# Patient Record
Sex: MALE | Race: WHITE | NOT HISPANIC OR LATINO | Employment: FULL TIME | URBAN - METROPOLITAN AREA
[De-identification: names, ages, dates, MRNs, and addresses within clinical notes are randomized per-mention and may not be internally consistent; named-entity substitution may affect disease eponyms.]

---

## 2017-01-30 ENCOUNTER — HOSPITAL ENCOUNTER (EMERGENCY)
Facility: CLINIC | Age: 57
Discharge: HOME | End: 2017-01-30
Attending: PHYSICIAN ASSISTANT | Admitting: PHYSICIAN ASSISTANT
Payer: OTHER MISCELLANEOUS

## 2017-01-30 VITALS
TEMPERATURE: 97.8 F | HEART RATE: 58 BPM | RESPIRATION RATE: 22 BRPM | DIASTOLIC BLOOD PRESSURE: 94 MMHG | OXYGEN SATURATION: 98 % | SYSTOLIC BLOOD PRESSURE: 163 MMHG

## 2017-01-30 DIAGNOSIS — M54.6 ACUTE RIGHT-SIDED THORACIC BACK PAIN: ICD-10-CM

## 2017-01-30 PROCEDURE — 99213 OFFICE O/P EST LOW 20 MIN: CPT | Performed by: PHYSICIAN ASSISTANT

## 2017-01-30 PROCEDURE — 25000132 ZZH RX MED GY IP 250 OP 250 PS 637: Performed by: PHYSICIAN ASSISTANT

## 2017-01-30 PROCEDURE — 99212 OFFICE O/P EST SF 10 MIN: CPT

## 2017-01-30 RX ORDER — HYDROCODONE BITARTRATE AND ACETAMINOPHEN 5; 325 MG/1; MG/1
2 TABLET ORAL ONCE
Status: COMPLETED | OUTPATIENT
Start: 2017-01-30 | End: 2017-01-30

## 2017-01-30 RX ORDER — CYCLOBENZAPRINE HCL 10 MG
10 TABLET ORAL 3 TIMES DAILY PRN
Qty: 20 TABLET | Refills: 0 | Status: SHIPPED | OUTPATIENT
Start: 2017-01-30 | End: 2017-02-05

## 2017-01-30 RX ADMIN — HYDROCODONE BITARTRATE AND ACETAMINOPHEN 2 TABLET: 5; 325 TABLET ORAL at 16:24

## 2017-01-30 NOTE — ED AVS SNAPSHOT
[facility]    [address]    [facility] [address]    Phone:  [phone]    Fax:  [phone]                                       Chin Jade   MRN: [id number]    Department:  [facility]   Date of Visit:  1/30/2017           Patient Information     Date Of Birth          1960        Your diagnoses for this visit were:     Acute right-sided thoracic back pain        You were seen by Marie Godinez PA-C.      Follow-up Information     Follow up with GIOVANNA Boles MD In 1 day.    Specialty:  Family Practice    Contact information:    [address] [id number] [phone]          Discharge Instructions         Ibuprofen 600-800 mg every 6 hours. TAKE WITH FOOD.  Flexeril as prescribed for muscle spasm.   Alternate heat and ice. Gentle stretching as tolerated.   Follow up with primary care doctor tomorrow for close reassessment.   Neck/Back Pain: General    Both neck and back pain are usually caused by injury to the muscles or ligaments of the spine. Sometimes the disks that separate each bone of the spine may cause pain by pressing on a nearby nerve. Back and neck pain may appear after a sudden twisting/bending force (such as in a car accident), or sometimes after a simple awkward movement. In either case, muscle spasm is often present and adds to the pain.  Acute neck and back pain usually gets better in 1 to 2 weeks. Pain related to disk disease, arthritis in the spinal joints or spinal stenosis (narrowing of the spinal canal) can become chronic and last for months or years.  Back and neck pain are common problems. Most people feel better in 1 or 2 weeks, and most of the rest in 1 to 2 months. Most people can remain active.  Symptoms  People experience and describe pain differently.    Pain can be sharp, stabbing, shooting, aching, cramping, or burning    Movement, standing, bending, lifting,  "sitting, or walking may worsen the pain    Pain can be localized to one spot or area, or it can be more generalized    Pain can spread or radiate upwards, downwards, to the front, or go down your arms    Muscle spasm may occur.  Cause  Most of the time \"mechanical problems\" with the muscles or spine cause the pain. it is usually caused by an injury, whether known or not, to the muscles or ligaments. While illnesses can cause back pain, it is usually not caused by a serious illness. Pain is usually related to physical activity, whether sports, exercise, work, or normal activity. Sometimes it can occur without an identifiable cause. This can happen simply by stretching or moving wrong, without noting pain at the time. Other causes include:    Overexertion, lifting, pushing, pulling incorrectly or too aggressively.    Sudden twisting, bending or stretching from an accident (car or fall), or accidental movement.    Poor posture    Poor conditioning, lack of regular exercise    Spinal disc disease or arthritis    Stress    Pregnancy, or illness like appendicitis, bladder or kidney infection, pelvic infections   Home care    For neck pain: Use a comfortable pillow that supports the head and keeps the spine in a neutral position. The position of the head should not be tilted forward or backward.    When in bed, try to find a position of comfort. A firm mattress is best. Try lying flat on your back with pillows under your knees. You can also try lying on your side with your knees bent up towards your chest and a pillow between your knees.    At first, do not try to stretch out the sore spots. If there is a strain, it is not like the good soreness you get after exercising without an injury. In this case, stretching may make it worse.    Avoid prolonged sitting, long car rides or travel. This puts more stress on the lower back than standing or walking.    During the first 24 to 72 hours after an injury, apply an ice pack to " the painful area for 20 minutes and then remove it for 20 minutes over a period of 60 to 90 minutes or several times a day. As a safety precaution, do not use a heating pad at bedtime. Sleeping with a heating pad can lead to skin burns or tissue damage.    Ice and heat therapies can be alternated. Talk with your health care provider about the best treatment for your back or neck pain.    Therapeutic massage can help relax the back and neck muscles without stretching them.    Be aware of safe lifting methods and do not lift anything over 15 pounds until all the pain is gone.  Medications  Talk to your health care provider before using medications, especially if you have other medical problems or are taking other medicines.    You may use acetaminophen (such as Tylenol) or ibuprofen (such as Advil or Motrin) to control pain, unless another pain medicine was prescribed. If you have chronic conditions like diabetes, liver or kidney disease, stomach ulcers,  gastrointestinal bleeding, or are taking blood thinner medications.    Be careful if you are given pain medicines, narcotics, or medication for muscle spasm. They can cause drowsiness, and can affect your coordination, reflexes, and judgment. Do not drive or operate heavy machinery.  Follow-up care  Follow up with your health care provider if your symptoms do not start to improve after one week. Physical therapy or further tests may be needed.  If X-rays were taken, they will be reviewed by a radiologist. You will be notified of any new findings that may affect your care.  Call 911  Seek emergency medical care if any of the following occur:    Trouble breathing    Confusion    Very drowsy or trouble awakening    Fainting or loss of consciousness    Rapid or very slow heart rate    Loss of bowel or bladder control  When to seek medical care  Get prompt medical attention if any of the following occur:    Pain becomes worse or spreads into your arms or legs    Weakness,  numbness or pain in one or both arms or legs    Numbness in the groin area    Difficulty walking    Fever of 100.4 F (38 C) or higher, or as directed by your healthcare provider    8233-7409 [employer]. [address] [place] [address], [place] [address]. All rights reserved. This information is not intended as a substitute for professional medical care. Always follow your healthcare professional's instructions.          24 Hour Appointment Hotline       To make an appointment at any [facility], call [phone] ([phone]). If you don't have a family doctor or clinic, we will help you find one. [facility] clinics are conveniently located to serve the needs of you and your family.             Review of your medicines      START taking        Dose / Directions Last dose taken    cyclobenzaprine 10 MG tablet   Commonly known as:  FLEXERIL   Dose:  10 mg   Quantity:  20 tablet        Take 1 tablet (10 mg) by mouth 3 times daily as needed for muscle spasms   Refills:  0          Our records show that you are taking the medicines listed below. If these are incorrect, please call your family doctor or clinic.        Dose / Directions Last dose taken    acyclovir 400 MG tablet   Commonly known as:  ZOVIRAX   Dose:  400 mg   Quantity:  10 tablet        Take 1 tablet (400 mg) by mouth 2 times daily for 5 days   Refills:  2        aspirin 81 MG tablet   Dose:  81 mg   Quantity:  100 tablet        Take 1 tablet (81 mg) by mouth daily   Refills:  3        atorvastatin 40 MG tablet   Commonly known as:  LIPITOR   Dose:  40 mg   Quantity:  90 tablet        Take 1 tablet (40 mg) by mouth daily   Refills:  3        blood glucose monitoring meter device kit   Quantity:  1 kit        Use to test blood sugars 2 times daily or as directed.   Refills:  0        blood glucose monitoring test strip   Commonly known as:  BL TEST STRIP PACK   Quantity:  1 Box        Free Style Freedom Lite Test Stips; test 2 times daily    Refills:  11        glimepiride 4 MG tablet   Commonly known as:  AMARYL   Dose:  2 mg   Quantity:  30 tablet        Take 1 tablet (4 mg) by mouth every morning (before breakfast)   Refills:  2        lisinopril 10 MG tablet   Commonly known as:  PRINIVIL/ZESTRIL   Dose:  10 mg   Quantity:  90 tablet        Take 1 tablet (10 mg) by mouth daily   Refills:  3        metFORMIN 500 MG tablet   Commonly known as:  GLUCOPHAGE   Dose:  1000 mg   Quantity:  120 tablet        Take 2 tablets (1,000 mg) by mouth 2 times daily (with meals)   Refills:  2        order for DME   Quantity:  4 Units        Equipment being ordered: 2 pair of diabetic shoes and 3 sets of shoe inserts   Refills:  0        sildenafil 100 MG cap/tab   Commonly known as:  VIAGRA   Dose:   mg   Quantity:  6 tablet        Take 0.5-1 tablets ( mg) by mouth daily as needed for erectile dysfunction Take 30 min to 4 hours before intercourse.  Never use with nitroglycerin, terazosin or doxazosin.   Refills:  11                Prescriptions were sent or printed at these locations (1 Prescription)                   [facility] [place], [place] - [address]   [address] [facility] [address]    Telephone:  [phone]   Fax:  [phone]   Hours:                  E-Prescribed (1 of 1)         cyclobenzaprine (FLEXERIL) 10 MG tablet                Orders Needing Specimen Collection     None      Pending Results     No orders found from 1/29/2017 to 1/31/2017.            Pending Culture Results     No orders found from 1/29/2017 to 1/31/2017.             Test Results from your hospital stay            Thank you for choosing [facility]       Thank you for choosing [facility] for your care. Our goal is always to provide you with excellent care. Hearing back from our patients is one way we can continue to improve our services. Please take a few minutes to complete the written survey that you may receive in the mail after you visit with  us. Thank you!        [email or web address] Information     [facility] gives you secure access to your electronic health record. If you see a primary care provider, you can also send messages to your care team and make appointments. If you have questions, please call your primary care clinic.  If you do not have a primary care provider, please call [phone] and they will assist you.        Care EveryWhere ID     This is your Care EveryWhere ID. This could be used by other organizations to access your [facility] medical records  [id number]        After Visit Summary       This is your record. Keep this with you and show to your community pharmacist(s) and doctor(s) at your next visit.

## 2017-01-30 NOTE — ED PROVIDER NOTES
"  History     Chief Complaint   Patient presents with     Shoulder Pain     \"muscle pain\" to the right shoulder. Denies injury, strain.     HPI  Chin Jade is a 57 year old male with a hx of T2 DM, tobacco abuse, and HLD who presents to the ED with complaint of right upper back pain that he first noticed this morning while at work. States that he was performing light duty tasks all morning including some repetative reaching motions when he noticed gradually worsening pain over his right posterior shoulder and thoracic back on the right side. He describes it as feeling as if a muscle is \"all bunched up under there\". He took ibuprofen without relief of his symptoms approximately 4 hours ago. He denies any associated numbness, tingling, weakness, chest pain, shortness of breath, or nausea. He has a history of similar symptoms in the past, although never this severe. No personal or family history of cardiac problems.     I have reviewed the Medications, Allergies, Past Medical and Surgical History, and Social History in the Epic system.    Review of Systems  Problem-focused review of systems otherwise negative except what is noted in the HPI.   Physical Exam   BP: (!) 163/94 mmHg  Pulse: 58  Temp: 97.8  F (36.6  C)  Resp: 22  SpO2: 98 %  Physical Exam  Nursing note and vitals were reviewed.  Constitutional: alert, well-appearing, appears uncomfortable.  HEENT: Within normal limits.  Neck: Supple, no adenopathy.  Cardiovascular: slow rate, regular rhythm, no murmur  Pulmonary/Chest: clear to auscultation bilaterally, no tachypnea, no wheezes, rales, or rhonchi. No chest wall tenderness.  Abdomen: soft, non-tender, no rebound or guarding, no organomegaly  Musculoskeletal: RUE: no bony tenderness clavicle, shoulder, upper arm, elbow. ROM RUE intact throughout. Radial pulses palpable and equal bilaterally. Strength 5/5 bilateral upper extremities. Sensation intact light touch. Reproducible tenderness right thoracic " paraspinal muscles and right posterior shoulder musculature without palpable spasm.  Back: no midline tenderness.   Neurological: Alert, oriented, logical thought process  Skin: Warm, dry, no rashes.  Psychiatric: Affect appropriate   ED Course   Procedures               Labs Ordered and Resulted from Time of ED Arrival Up to the Time of Departure from the ED - No data to display    Assessments & Plan (with Medical Decision Making)   57 year old male with a hx of T2 DM, tobacco abuse, and HLD who presents to the ED with complaint of right upper back pain that he first noticed this morning while at work. VS are notable for hypertension, likely secondary to pain. Patient has very reproducible tenderness with palpation right thoracic paraspinal muscles and posterior shoulder. No bony tenderness. CMS intact.     Patient's history and presentation are not concerning for ACS/MI as this would be very atypical - further patient has no history of CAD. His symptoms are not exertional which is less concerning for anginal equivalent. Aortic dissection considered, however patient's pain is not located over his chest. He has equal strength bilaterally and equal radial pulses. I have a very low suspicion for this based on patient's very reproducible pain on exam. Cervical radiculopathy considered, however patient has no history of neck pain and no midline tenderness. Further, he does not have neurologic symptoms that would be concerning for this. No abdominal pain to suggest radiated pain from diaphragmatic irritation. Patient's history of repetitive work while at work, reproducible tenderness, and gradually worsening symptoms is concerning for possible muscle strain. I had an extensive discussion with patient and his wife regarding the differential diagnosis. Patient feels strongly that this is a pulled muscle and requests symptomatic relief with close PCP follow up. I feel this is appropriate given history and presentation that  "suggests musculoskeletal etiology. Will give norco x 2 here in the ED and have patient follow up with PCP tomorrow. Will d/c with flexeril and instructions to take ibuprofen at the anti-inflammatory dosage. He will make an appointment with Dr. Boles tomorrow. Discussed \"red flag\" sx warranting return to the UC/ED.     I have reviewed the nursing notes.    I have reviewed the findings, diagnosis, plan and need for follow up with the patient.    New Prescriptions    CYCLOBENZAPRINE (FLEXERIL) 10 MG TABLET    Take 1 tablet (10 mg) by mouth 3 times daily as needed for muscle spasms       Final diagnoses:   Acute right-sided thoracic back pain     Marie Godinez PA-C     1/30/2017   [facility] EMERGENCY DEPARTMENT      Marie Godinez PA-C  01/30/17 1634  "

## 2017-01-30 NOTE — LETTER
[facility] EMERGENCY DEPARTMENT  [address] [address]  [phone]    2017    Chin Jade   [address]  [facility] [address]  [phone] (home)     : 1960      To Whom it may concern:    Chin Jade was seen in our Emergency Department today, 2017. He can return to work 2017.    Sincerely,        Marie Godinez

## 2017-01-30 NOTE — ED AVS SNAPSHOT
[facility]    [address] [facility] [address]    Phone:  [phone]    Fax:  [phone]                                       Chin Jade   MRN: [id number]    Department:  [facility]   Date of Visit:  1/30/2017           After Visit Summary Signature Page     I have received my discharge instructions, and my questions have been answered. I have discussed any challenges I see with this plan with the nurse or doctor.    ..........................................................................................................................................  Patient/Patient Representative Signature      ..........................................................................................................................................  Patient Representative Print Name and Relationship to Patient    ..................................................               ................................................  Date                                            Time    ..........................................................................................................................................  Reviewed by Signature/Title    ...................................................              ..............................................  Date                                                            Time

## 2017-01-30 NOTE — DISCHARGE INSTRUCTIONS
"  Ibuprofen 600-800 mg every 6 hours. TAKE WITH FOOD.  Flexeril as prescribed for muscle spasm.   Alternate heat and ice. Gentle stretching as tolerated.   Follow up with primary care doctor tomorrow for close reassessment.   Neck/Back Pain: General    Both neck and back pain are usually caused by injury to the muscles or ligaments of the spine. Sometimes the disks that separate each bone of the spine may cause pain by pressing on a nearby nerve. Back and neck pain may appear after a sudden twisting/bending force (such as in a car accident), or sometimes after a simple awkward movement. In either case, muscle spasm is often present and adds to the pain.  Acute neck and back pain usually gets better in 1 to 2 weeks. Pain related to disk disease, arthritis in the spinal joints or spinal stenosis (narrowing of the spinal canal) can become chronic and last for months or years.  Back and neck pain are common problems. Most people feel better in 1 or 2 weeks, and most of the rest in 1 to 2 months. Most people can remain active.  Symptoms  People experience and describe pain differently.    Pain can be sharp, stabbing, shooting, aching, cramping, or burning    Movement, standing, bending, lifting, sitting, or walking may worsen the pain    Pain can be localized to one spot or area, or it can be more generalized    Pain can spread or radiate upwards, downwards, to the front, or go down your arms    Muscle spasm may occur.  Cause  Most of the time \"mechanical problems\" with the muscles or spine cause the pain. it is usually caused by an injury, whether known or not, to the muscles or ligaments. While illnesses can cause back pain, it is usually not caused by a serious illness. Pain is usually related to physical activity, whether sports, exercise, work, or normal activity. Sometimes it can occur without an identifiable cause. This can happen simply by stretching or moving wrong, without noting pain at the time. Other causes " include:    Overexertion, lifting, pushing, pulling incorrectly or too aggressively.    Sudden twisting, bending or stretching from an accident (car or fall), or accidental movement.    Poor posture    Poor conditioning, lack of regular exercise    Spinal disc disease or arthritis    Stress    Pregnancy, or illness like appendicitis, bladder or kidney infection, pelvic infections   Home care    For neck pain: Use a comfortable pillow that supports the head and keeps the spine in a neutral position. The position of the head should not be tilted forward or backward.    When in bed, try to find a position of comfort. A firm mattress is best. Try lying flat on your back with pillows under your knees. You can also try lying on your side with your knees bent up towards your chest and a pillow between your knees.    At first, do not try to stretch out the sore spots. If there is a strain, it is not like the good soreness you get after exercising without an injury. In this case, stretching may make it worse.    Avoid prolonged sitting, long car rides or travel. This puts more stress on the lower back than standing or walking.    During the first 24 to 72 hours after an injury, apply an ice pack to the painful area for 20 minutes and then remove it for 20 minutes over a period of 60 to 90 minutes or several times a day. As a safety precaution, do not use a heating pad at bedtime. Sleeping with a heating pad can lead to skin burns or tissue damage.    Ice and heat therapies can be alternated. Talk with your health care provider about the best treatment for your back or neck pain.    Therapeutic massage can help relax the back and neck muscles without stretching them.    Be aware of safe lifting methods and do not lift anything over 15 pounds until all the pain is gone.  Medications  Talk to your health care provider before using medications, especially if you have other medical problems or are taking other medicines.    You  may use acetaminophen (such as Tylenol) or ibuprofen (such as Advil or Motrin) to control pain, unless another pain medicine was prescribed. If you have chronic conditions like diabetes, liver or kidney disease, stomach ulcers,  gastrointestinal bleeding, or are taking blood thinner medications.    Be careful if you are given pain medicines, narcotics, or medication for muscle spasm. They can cause drowsiness, and can affect your coordination, reflexes, and judgment. Do not drive or operate heavy machinery.  Follow-up care  Follow up with your health care provider if your symptoms do not start to improve after one week. Physical therapy or further tests may be needed.  If X-rays were taken, they will be reviewed by a radiologist. You will be notified of any new findings that may affect your care.  Call 911  Seek emergency medical care if any of the following occur:    Trouble breathing    Confusion    Very drowsy or trouble awakening    Fainting or loss of consciousness    Rapid or very slow heart rate    Loss of bowel or bladder control  When to seek medical care  Get prompt medical attention if any of the following occur:    Pain becomes worse or spreads into your arms or legs    Weakness, numbness or pain in one or both arms or legs    Numbness in the groin area    Difficulty walking    Fever of 100.4 F (38 C) or higher, or as directed by your healthcare provider    1919-6487 [employer] [address] [place] [address], [place] [address]. All rights reserved. This information is not intended as a substitute for professional medical care. Always follow your healthcare professional's instructions.

## 2017-01-31 ENCOUNTER — OFFICE VISIT (OUTPATIENT)
Dept: FAMILY MEDICINE | Facility: CLINIC | Age: 57
End: 2017-01-31
Payer: COMMERCIAL

## 2017-01-31 VITALS
SYSTOLIC BLOOD PRESSURE: 138 MMHG | TEMPERATURE: 97.7 F | DIASTOLIC BLOOD PRESSURE: 82 MMHG | RESPIRATION RATE: 16 BRPM | BODY MASS INDEX: 32.34 KG/M2 | HEART RATE: 76 BPM | WEIGHT: 252 LBS

## 2017-01-31 DIAGNOSIS — M79.18 ACUTE MYOFASCIAL PAIN: Primary | ICD-10-CM

## 2017-01-31 PROCEDURE — 99213 OFFICE O/P EST LOW 20 MIN: CPT | Performed by: FAMILY MEDICINE

## 2017-01-31 RX ORDER — HYDROCODONE BITARTRATE AND ACETAMINOPHEN 10; 325 MG/1; MG/1
2 TABLET ORAL EVERY 6 HOURS PRN
Qty: 24 TABLET | Refills: 0 | Status: SHIPPED | OUTPATIENT
Start: 2017-01-31 | End: 2017-02-26

## 2017-01-31 NOTE — NURSING NOTE
"Chief Complaint   Patient presents with     Shoulder Pain       Initial /82 mmHg  Pulse 76  Temp(Src) 97.7  F (36.5  C) (Oral)  Resp 16  Wt 252 lb (114.306 kg) Estimated body mass index is 32.34 kg/(m^2) as calculated from the following:    Height as of 1/12/16: 6' 2\" (1.88 m).    Weight as of this encounter: 252 lb (114.306 kg).  BP completed using cuff size: large    "

## 2017-01-31 NOTE — MR AVS SNAPSHOT
After Visit Summary   1/31/2017    Chin Jade    MRN: [id number]           Patient Information     Date Of Birth          1960        Visit Information        Provider Department      1/31/2017 9:20 AM GIOVANNA Boles MD [facility]        Today's Diagnoses     Acute myofascial pain    -  1       Care Instructions    Alternate ice and heat. Use a tennis ball to massage the area. REcheck if not better in 6 days        Follow-ups after your visit        Who to contact     If you have questions or need follow up information about today's clinic visit or your schedule please contact [facility] directly at [phone].  Normal or non-critical lab and imaging results will be communicated to you by MyChart, letter or phone within 4 business days after the clinic has received the results. If you do not hear from us within 7 days, please contact the clinic through [employer] or phone. If you have a critical or abnormal lab result, we will notify you by phone as soon as possible.  Submit refill requests through [employer] or call your pharmacy and they will forward the refill request to us. Please allow 3 business days for your refill to be completed.          Additional Information About Your Visit        MyChart Information     [employer] gives you secure access to your electronic health record. If you see a primary care provider, you can also send messages to your care team and make appointments. If you have questions, please call your primary care clinic.  If you do not have a primary care provider, please call [phone] and they will assist you.        Care EveryWhere ID     This is your Care EveryWhere ID. This could be used by other organizations to access your [place] medical records  [id number]        Your Vitals Were     Pulse Temperature Respirations             76 97.7  F (36.5  C) (Oral) 16          Blood Pressure from Last 3 Encounters:   01/31/17 140/82    01/30/17 163/94   07/18/16 130/62    Weight from Last 3 Encounters:   01/31/17 252 lb (114.306 kg)   07/18/16 246 lb 9.6 oz (111.857 kg)   01/12/16 255 lb (115.667 kg)              Today, you had the following     No orders found for display         Today's Medication Changes          These changes are accurate as of: 1/31/17 10:03 AM.  If you have any questions, ask your nurse or doctor.               Start taking these medicines.        Dose/Directions    HYDROcodone-acetaminophen  MG per tablet   Commonly known as:  NORCO   Used for:  Acute myofascial pain   Started by:  GIOVANNA Boles MD        Dose:  2 tablet   Take 2 tablets by mouth every 6 hours as needed for moderate to severe pain maximum 8 tablet(s) per day   Quantity:  24 tablet   Refills:  0            Where to get your medicines      Some of these will need a paper prescription and others can be bought over the counter.  Ask your nurse if you have questions.     Bring a paper prescription for each of these medications    - HYDROcodone-acetaminophen  MG per tablet             Primary Care Provider Office Phone # Fax #    GIOVANNA Boles [phone]       [place] [id number]        Thank you!     Thank you for choosing [facility]  for your care. Our goal is always to provide you with excellent care. Hearing back from our patients is one way we can continue to improve our services. Please take a few minutes to complete the written survey that you may receive in the mail after your visit with us. Thank you!             Your Updated Medication List - Protect others around you: Learn how to safely use, store and throw away your medicines at www.disposemymeds.org.          This list is accurate as of: 1/31/17 10:03 AM.  Always use your most recent med list.                   Brand Name Dispense Instructions for use    acyclovir 400 MG tablet    ZOVIRAX    10 tablet     Take 1 tablet (400 mg) by mouth 2 times daily for 5 days       aspirin 81 MG tablet     100 tablet    Take 1 tablet (81 mg) by mouth daily       atorvastatin 40 MG tablet    LIPITOR    90 tablet    Take 1 tablet (40 mg) by mouth daily       blood glucose monitoring meter device kit     1 kit    Use to test blood sugars 2 times daily or as directed.       blood glucose monitoring test strip    BL TEST STRIP PACK    1 Box    Free Style Freedom Lite Test Stips; test 2 times daily       cyclobenzaprine 10 MG tablet    FLEXERIL    20 tablet    Take 1 tablet (10 mg) by mouth 3 times daily as needed for muscle spasms       glimepiride 4 MG tablet    AMARYL    30 tablet    Take 1 tablet (4 mg) by mouth every morning (before breakfast)       HYDROcodone-acetaminophen  MG per tablet    NORCO    24 tablet    Take 2 tablets by mouth every 6 hours as needed for moderate to severe pain maximum 8 tablet(s) per day       lisinopril 10 MG tablet    PRINIVIL/ZESTRIL    90 tablet    Take 1 tablet (10 mg) by mouth daily       metFORMIN 500 MG tablet    GLUCOPHAGE    120 tablet    Take 2 tablets (1,000 mg) by mouth 2 times daily (with meals)       order for [facility]     4 Units    Equipment being ordered: 2 pair of diabetic shoes and 3 sets of shoe inserts       sildenafil 100 MG cap/tab    VIAGRA    6 tablet    Take 0.5-1 tablets ( mg) by mouth daily as needed for erectile dysfunction Take 30 min to 4 hours before intercourse.  Never use with nitroglycerin, terazosin or doxazosin.

## 2017-01-31 NOTE — Clinical Note
[facility]  [address]  [facility] [address]  Phone: [phone]      REPORT OF WORK ABILITY    NOTE TO EMPLOYEE: You must promptly provide a copy of this report to your  employer or worker's compensation insurer, and Qualified Rehabilitation Consultant.    Date: 1/31/2017                     Employee Name: Chin Jade         YOB: 1960  Medical Record Number: [id number]   Soc.Sec.No: [id number]  Employer: None                Date of Injury: 1/30/17  Managed Care Organization / Insurance Company Name: UNKNOWN    Diagnosis: myofascial pain syndrome  Work Related: yes     MMI: NO   Permanent Partial Disability(PPD) likely: UNKNOWN    EMPLOYEE IS ABLE TO WORK: OFF Work until 2/6/17.     RESTRICTIONS IF ANY:         OTHER RESTRICTIONS: None    TREATMENT PLAN/NOTES: change work position for comfort.      MELODY Boles MD/

## 2017-01-31 NOTE — PROGRESS NOTES
SUBJECTIVE:                                                    Chin Jade is a 57 year old male who presents to clinic today for the following health issues:      ED/UC Followup: Shoulder Pain    Facility:  [facility]  Date of visit: 1/30/2017  Reason for visit: Shoulder Pain  Current Status: Pain has improved, but patient is currently experiencing numbness and weakness in right arm hand and fingers           Problem list and histories reviewed & adjusted, as indicated.  Additional history: He went into the urgent care because of excruciating pain in the right upper back area underneath the shoulder blade. This occurred while at work. 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 In urgent care he was given a few tablets of hydrocodone and cyclobenzaprine. The pain is not is intense as it was when he went into urgent care but he is still quite uncomfortable and had difficulty sleeping last night. Since he was in urgent care has noticed some numbness extending down his right arm and along the ulnar distribution            OBJECTIVE:                                                    /82 mmHg  Pulse 76  Temp(Src) 97.7  F (36.5  C) (Oral)  Resp 16  Wt 252 lb (114.306 kg)    GENERAL: healthy, alert and no distress  EYES: Eyes grossly normal to inspection, extraocular movements - intact, and PERRL  NECK: no tenderness, no adenopathy, no asymmetry, no masses, no stiffness; thyroid- normal to palpation  MS: Right shoulder exam shows normal appearance and range of motion; tenderness to palpation in the region of the right scapula and yet the muscles involved are underneath the scapula so it is difficult to elicit the pain.  NEURO: Normal strength and tone in the right upper extremity, subjective numbness along the ulnar distribution and some pain along the posterior  triceps area         ASSESSMENT/PLAN:                                                    ASSESSMENT:  1. Acute myofascial pain     with some radicular pain into the right arm    PLAN:  Orders Placed This Encounter     HYDROcodone-acetaminophen (NORCO)  MG per tablet       Patient Instructions   Alternate ice and heat. Use a tennis ball to massage the area. REcheck if not better in 6 days    He was given a workability note keeping him out of work the rest of this week with return to work on February 6      GIOVANNA Boles  [facility]

## 2017-02-01 ENCOUNTER — TELEPHONE (OUTPATIENT)
Dept: FAMILY MEDICINE | Facility: CLINIC | Age: 57
End: 2017-02-01

## 2017-02-01 NOTE — TELEPHONE ENCOUNTER
Reason for Call:  Other call back    Detailed comments: Pt Employer calling wanting to know why pt will be off work until Feb 6th, See OV note 1/31, please advise     Phone Number Patient can be reached at: Other phone number:  Sandy [phone]*    Best Time: any    Can we leave a detailed message on this number? Not Applicable    Call taken on 2/1/2017 at 11:25 AM by Latesha Saavedra

## 2017-02-01 NOTE — TELEPHONE ENCOUNTER
"Patient gave verbal permission to speak with Sandy (HR patient's employer) regarding patient and workability.  Sandy states conflicting information regarding recent work related injury and would like clarification.  Patient was seen in ED for back pain 1-30-17.  Note given to patient states he was ok to return to work 2-1-17 (see under letters from Cynthia Godinez).  Patient was then seen 1-31-17 by PCP and a workability form was completed for him to be off work until 2-6-17.    Sandy is requesting why was his leave from work extended?  She states \"he was just a little sore.\"    Sandy is aware PCP is out of the office today, returning tomorrow.    Routing to provider.  Mohini BURNS RN           "

## 2017-02-02 NOTE — TELEPHONE ENCOUNTER
When he was seen in follow-up, he was having too much pain and spasm to be able to do his normal job responsibilities. He should be back to full capabilities by February 6. Josy Delgado

## 2017-02-02 NOTE — TELEPHONE ENCOUNTER
Spoke with Sandy - they are based out of [place], so maybe could not get through earlier due to time zone?  She was given PCP note below.  Patient agrees with plan and verbalized understanding.    Mohini BURNS RN

## 2017-02-16 ENCOUNTER — TELEPHONE (OUTPATIENT)
Dept: FAMILY MEDICINE | Facility: CLINIC | Age: 57
End: 2017-02-16

## 2017-02-16 DIAGNOSIS — M79.18 MYOFASCIAL PAIN SYNDROME: Primary | ICD-10-CM

## 2017-02-16 NOTE — TELEPHONE ENCOUNTER
Wife was called and notified of provider's message below.   Wife verbalized understanding and had no further questions at this time.   Encounter closed.   Jo CHAND RN

## 2017-02-16 NOTE — TELEPHONE ENCOUNTER
1/31/17 Office Notes:   ASSESSMENT:  1. Acute myofascial pain     with some radicular pain into the right arm    Referral pending.   Please advise   Thank you.

## 2017-02-16 NOTE — TELEPHONE ENCOUNTER
Reason for Call: Request for an order or referral:    Order or referral being requested: Pt's spouse Joelle choudhary - Pt was seen in ER 01/30 and saw Dr. Boles 01/31 for shoulder pain.  Pt has appt tomorrow in P.T. and order needs to be placed.      Date needed: as soon as possible    Has the patient been seen by the PCP for this problem? YES    Additional comments:     Phone number Patient can be reached at:  Cell number on file:    Telephone Information:   Mobile [phone] - Joelle     Best Time:      Can we leave a detailed message on this number?  YES    Call taken on 2/16/2017 at 11:44 AM by Angelica Lee

## 2017-02-17 ENCOUNTER — HOSPITAL ENCOUNTER (OUTPATIENT)
Dept: PHYSICAL THERAPY | Facility: CLINIC | Age: 57
Setting detail: THERAPIES SERIES
End: 2017-02-17
Attending: PREVENTIVE MEDICINE
Payer: COMMERCIAL

## 2017-02-17 PROCEDURE — 97140 MANUAL THERAPY 1/> REGIONS: CPT | Mod: GP | Performed by: PHYSICAL THERAPIST

## 2017-02-17 PROCEDURE — 97161 PT EVAL LOW COMPLEX 20 MIN: CPT | Mod: GP | Performed by: PHYSICAL THERAPIST

## 2017-02-17 PROCEDURE — 97535 SELF CARE MNGMENT TRAINING: CPT | Mod: GP | Performed by: PHYSICAL THERAPIST

## 2017-02-17 PROCEDURE — 40000718 ZZHC STATISTIC PT DEPARTMENT ORTHO VISIT: Performed by: PHYSICAL THERAPIST

## 2017-02-17 NOTE — PROGRESS NOTES
"Chin Marquesaubrey  1960   02/17/17 0800   General Information   Type of Visit Initial OP Ortho PT Evaluation   Start of Care Date 02/17/17   Referring Physician Harsha Qureshi MD [facility]   Patient/Family Goals Statement numbness and weakness in R hand   Orders Evaluate and Treat   Date of Order 02/10/17   Insurance Type Other  (Self Pay)   Medical Diagnosis R shoulder/scap pain; ulnar neuritis    Surgical/Medical history reviewed Yes   Precautions/Limitations no known precautions/limitations   Body Part(s)   Body Part(s) Shoulder   Presentation and Etiology   Pertinent history of current problem (include personal factors and/or comorbidities that impact the POC) 58 yo R handed male with c/o R shoulder arm pain onset while at work.  Does not recall a specific incident or injury.  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 [place] pain shortly after shift started; couple hour later took advill with no relief; left work ~10:30 .  Later that same day went to ED due to increasing intensity of pain. At ED got 1-2 doses of vicodin and script for flexeril; follow up with PCP; Saw Dr Boles next day who gave script for vicodin, stay off work and recommended followup in one week. Employer then sent pt to Dr. Qureshi. NOW on \"light duty at work; Pain seems to be moving around; pain still upper back and shoulder blade.  Has full ROM but if reaching or extend arm feels like bolt of lightening;  Can't sleep, aches all night. has been on sofa; concerned about hand weakness and decreased coordinatrion; cant open can of pop, struggles picking up pen and writing; Pain is above shoulder blade, down along back side of arm, inside elbow and little finger side of forearm to little finger and half of ring finger   Impairments A. Pain;D. Decreased ROM;E. Decreased flexibility;F. Decreased strength and endurance;K. Numbness;J. Burning   Functional Limitations perform activities of daily living;perform required " work activities;perform desired leisure / sports activities   Symptom Location numbness along ulnar nerve distribution   How/Where did it occur At work;Other  (was doing typical wrk; recalls no specific incident or injur)   Onset date of current episode/exacerbation 01/30/17   Chronicity New   Pain rating (0-10 point scale) Best (/10);Worst (/10)   Best (/10) 5   Worst (/10) 10   Pain quality A. Sharp;B. Dull;C. Aching;D. Burning   Frequency of pain/symptoms A. Constant   Pain/symptoms exacerbated by H. Overhead reach;G. Certain positions   Pain/symptoms eased by I. OTC medication(s)  (ibuprofen)   Progression of symptoms since onset: Improved;Other  (no longer has searing pain in scap; has distal burn)   Pain progression comment no longer has searing pain in scap; has distal burn   Prior Level of Function   Prior Level of Function-Mobility independent   Current Level of Function   Current Community Support Family/friend caregiver   Patient role/employment history A. Employed   Employment Comments full time; works refurbishing mobile homes   Fall Risk Screen   Fall screen completed by PT   Per patient - Fall 2 or more times in past year? No   Shoulder Objective Findings   Side (if bilateral, select both right and left) Right   Observation muscle atrophy R>L supraspinatus, infraspinatus and mid trap;    Posture post pelvic tilt; flat L spine, rounded shoulders and forward head in sitting; able to correct somewhat with cueing but effortful   Thoracic Outlet Syndrom (Lary, Funmi, Basia, Rodríguez) -   Scapulothoracic Rhythm wings prematurely   Pec Minor (supine) Flexibility tight   Neer's Test pain, nerve   Shoulder Special Tests Comments R first rib tender and decreased mobility   Right Shoulder Flexion AROM full, painful at endrange flxn   Right Shoulder Flexion PROM pain with over pressure/endrange flxn   Right Shoulder Abduction AROM wnl scap plane   Right Shoulder ER AROM WNL   Right Shoulder IR AROM WNL   Right  Shoulder Flexion Strength 4+ pain   Right Shoulder Abduction Strength 5-   Right Shoulder ER Strength 4+   Right Shoulder IR Strength 5   Right Mid Trapezius Strength 4   Right Lower Trapezius Strength 4   Planned Therapy Interventions   Planned Therapy Interventions manual therapy;neuromuscular re-education;strengthening;stretching   Planned Modality Interventions   Planned Modality Interventions Electrical stimulation;Ultrasound;Traction;TENS   Planned Modality Interventions Comments for pain control   Clinical Impression   Criteria for Skilled Therapeutic Interventions Met yes, treatment indicated   PT Diagnosis R shoulder and arm pain; consistent with unlar neuritis   Influenced by the following impairments pain, scap weakness; poor posture   Functional limitations due to impairments unable to sleep; difficulty with all ADLS due to pain   Clinical Presentation Stable/Uncomplicated   Clinical Presentation Rationale symptoms with minimal change   Clinical Decision Making (Complexity) Low complexity   Therapy Frequency 2 times/Week  (to decrease pain then decrease to 1x/week to strengthen)   Predicted Duration of Therapy Intervention (days/wks) 8 weeks   Risk & Benefits of therapy have been explained Yes   Patient, Family & other staff in agreement with plan of care Yes   Clinical Impression Comments Pt will benefit from skilled intervention to decrease pain and then strengthen to improve scapular stability allowing him to continue to work without difficulty   Education Assessment   Preferred Learning Style Listening;Reading;Demonstration;Pictures/video   Barriers to Learning No barriers   ORTHO GOALS   PT Ortho Eval Goals 1;2;3;4;5   Ortho Goal 1   Goal Description abolish pain   Target Date 03/31/17   Ortho Goal 2   Goal Description improve  strength to age normal   Target Date 04/14/17   Ortho Goal 3   Goal Description decrease pain to allow sleeping through the night (without being awakened by UB or arm  Pain) in bed in order to achieve restful sleep to heal   Target Date 03/17/17   Ortho Goal 4   Goal Description Reach to endrange R shoulder flxn without nerve pain   Target Date 03/17/17   Ortho Goal 5   Goal Description Independent in HEP for strength and posture 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

## 2017-02-21 ENCOUNTER — HOSPITAL ENCOUNTER (OUTPATIENT)
Dept: PHYSICAL THERAPY | Facility: CLINIC | Age: 57
Setting detail: THERAPIES SERIES
End: 2017-02-21
Attending: PREVENTIVE MEDICINE
Payer: COMMERCIAL

## 2017-02-21 PROCEDURE — 40000718 ZZHC STATISTIC PT DEPARTMENT ORTHO VISIT: Performed by: PHYSICAL THERAPIST

## 2017-02-21 PROCEDURE — 97140 MANUAL THERAPY 1/> REGIONS: CPT | Mod: GP | Performed by: PHYSICAL THERAPIST

## 2017-02-24 ENCOUNTER — HOSPITAL ENCOUNTER (OUTPATIENT)
Dept: PHYSICAL THERAPY | Facility: CLINIC | Age: 57
Setting detail: THERAPIES SERIES
End: 2017-02-24
Attending: PREVENTIVE MEDICINE
Payer: COMMERCIAL

## 2017-02-24 PROCEDURE — 97110 THERAPEUTIC EXERCISES: CPT | Mod: GP | Performed by: PHYSICAL THERAPIST

## 2017-02-24 PROCEDURE — 97035 APP MDLTY 1+ULTRASOUND EA 15: CPT | Mod: GP | Performed by: PHYSICAL THERAPIST

## 2017-02-24 PROCEDURE — 40000718 ZZHC STATISTIC PT DEPARTMENT ORTHO VISIT: Performed by: PHYSICAL THERAPIST

## 2017-02-26 ENCOUNTER — HOSPITAL ENCOUNTER (EMERGENCY)
Facility: CLINIC | Age: 57
Discharge: HOME | End: 2017-02-26
Attending: NURSE PRACTITIONER | Admitting: NURSE PRACTITIONER
Payer: COMMERCIAL

## 2017-02-26 VITALS
DIASTOLIC BLOOD PRESSURE: 87 MMHG | WEIGHT: 240 LBS | SYSTOLIC BLOOD PRESSURE: 189 MMHG | BODY MASS INDEX: 30.81 KG/M2 | HEART RATE: 80 BPM | OXYGEN SATURATION: 98 % | TEMPERATURE: 98 F | RESPIRATION RATE: 16 BRPM

## 2017-02-26 DIAGNOSIS — M79.18 ACUTE MYOFASCIAL PAIN: ICD-10-CM

## 2017-02-26 PROCEDURE — 99212 OFFICE O/P EST SF 10 MIN: CPT

## 2017-02-26 PROCEDURE — 99213 OFFICE O/P EST LOW 20 MIN: CPT | Performed by: NURSE PRACTITIONER

## 2017-02-26 RX ORDER — CYCLOBENZAPRINE HCL 10 MG
10 TABLET ORAL 3 TIMES DAILY PRN
Qty: 20 TABLET | Refills: 0 | Status: SHIPPED | OUTPATIENT
Start: 2017-02-26 | End: 2017-03-04

## 2017-02-26 RX ORDER — OXYCODONE AND ACETAMINOPHEN 5; 325 MG/1; MG/1
1-2 TABLET ORAL EVERY 6 HOURS PRN
Qty: 20 TABLET | Refills: 0 | Status: SHIPPED | OUTPATIENT
Start: 2017-02-26 | End: 2017-09-14

## 2017-02-26 NOTE — ED PROVIDER NOTES
"  History     Chief Complaint   Patient presents with     Arm Pain     injured at work on 1-30-17.Ongoing issue. More pain today. Worse with mvmt. Taking ibuprofen 0700, flexeril at HS. Tramadol at 10am today. \" I haven't taken anything the rest of the day in case you guys had something more to offer me\"     HPI  Chin Jade is a 57 year old male with history hyperlipidemia, Type II DM, diabetic neuropathy, ED and tobacco use disorder who presents to urgent care for right thoracic paraspinal and shoulder pain.  Pain has been ongoing since starting on 1/30/17 while he was working doing some repetitive overhead tasks and he was evaluated here for acute onset of pain.  He has since been following with his PCP, Dr. Boles, [employer]s comp, and physical therapy for acute myofacial pain. He attended physical therapy on Friday, 2 days ago, and had an ultrasound treatment done.  He reports he felt pretty good afterwards and had been taking Ibuprofen as needed and flexeril at bedtime. Previously prescribed Vicodin (short course) and then Tramadol. The tramadol did not help him much and so he has not been taking that. He is also using ice alternating with heat.  Yesterday his pain in the right rhomboid region increased in severity and he was unable to sleep last night.  Today he states the pain is unbearable and he does not know what to do about it. He has numbness along the ulnar aspect of his right arm and his 4th and 5th phalanx.  This is not new since the increased pain.  He is awaiting approval from worker's myLINGO to have an MRI.  He would like to get something today to help his pain until he can get in contact with workerPollsbs comp and his PCP.    Patient Active Problem List   Diagnosis     Hyperlipidemia LDL goal <100     Tobacco use disorder     ED (erectile dysfunction)     Diabetic neuropathy (H)     Type 2 diabetes mellitus with diabetic polyneuropathy (H)     Microalbuminuria         I have reviewed the " Medications, Allergies, Past Medical and Surgical History, and Social History in the Epic system.    Review of Systems  As mentioned above in the history present illness. All other systems were reviewed and are negative.    Physical Exam   BP: 189/87  Pulse: 80  Temp: 98  F (36.7  C)  Resp: 16  Weight: 108.9 kg (240 lb)  SpO2: 98 %  Physical Exam    GENERAL APPEARANCE: healthy, alert and appears to be in moderate distress  EYES: EOMI,  PERRL, conjunctiva clear  HENT: ear canals and TM's normal.  Nose and mouth without ulcers, erythema or lesions  NECK: supple, nontender, no lymphadenopathy  RESP: lungs clear to auscultation - no rales, rhonchi or wheezes  CV: regular rates and rhythm, normal S1 S2, no murmur noted  MUSC: Radial pulses palpable and equal bilaterally. Sensation intact light touch with the exception of 4th and 5th phalanx which patient reports numbness.  Reproducible tenderness right thoracic paraspinal muscles and right posterior shoulder musculature without palpable spasm.  no midline tenderness of thoracic or cervical spine.     ED Course     ED Course     Procedures           Labs Ordered and Resulted from Time of ED Arrival Up to the Time of Departure from the ED - No data to display    Assessments & Plan (with Medical Decision Making)   Acute myofacial pain- worker's comp injury. Increased pain over the weekend with no relief with ice/heat and ibuprofen. Unable to sleep or find a good position. No new neurologic complaints.     Plan as follows:  Use ibuprofen 400-600 mg up to 4 times per day if needed for pain. Stop if it is causing nausea or abdominal pain.   Add Percocet 5-325 (oxycodone-acetaminophen) 1-2 pills up to every 6 hours if needed for pain. Do not use alcohol, operate machinery, drive, or climb on ladders for 8 hours after taking Percocet. Use docusate (100mg) 2 times a day to prevent constipation while on narcotics.  Contact Dr. Boles and/or Workman's Comp doctor regarding further  pain management and plan.  I stressed this and patient informed that any narcotic prescriptions for ongoing pain should go through is PCP or workman's comp physician.  Continue physical therapy.  Continue ice/heat to region of pain.  Continue work-restrictions with the addition of time off if pain is not tolerable with Ibuprofen, and requiring narcotic for pain control.      I have reviewed the nursing notes.    I have reviewed the findings, diagnosis, plan and need for follow up with the patient.    Discharge Medication List as of 2/26/2017  5:18 PM      START taking these medications    Details   cyclobenzaprine (FLEXERIL) 10 MG tablet Take 1 tablet (10 mg) by mouth 3 times daily as needed for muscle spasms, Disp-20 tablet, R-0, Local Print      oxyCODONE-acetaminophen (PERCOCET) 5-325 MG per tablet Take 1-2 tablets by mouth every 6 hours as needed for moderate to severe pain, Disp-20 tablet, R-0, Local Print             Final diagnoses:   Acute myofascial pain       2/26/2017   [facility] EMERGENCY DEPARTMENT     Corina Mclaughlin APRN CNP  02/26/17 1829

## 2017-02-26 NOTE — ED AVS SNAPSHOT
[facility] Emergency Department    [address] [address]    Phone:  [phone]    Fax:  [phone]                                       Chin Jade   MRN: [id number]    Department:  [facility] Emergency Department   Date of Visit:  2/26/2017           After Visit Summary Signature Page     I have received my discharge instructions, and my questions have been answered. I have discussed any challenges I see with this plan with the nurse or doctor.    ..........................................................................................................................................  Patient/Patient Representative Signature      ..........................................................................................................................................  Patient Representative Print Name and Relationship to Patient    ..................................................               ................................................  Date                                            Time    ..........................................................................................................................................  Reviewed by Signature/Title    ...................................................              ..............................................  Date                                                            Time

## 2017-02-26 NOTE — ED NOTES
"injured at work on 1-30-17.Ongoing issue. More pain today. Worse with mvmt. Taking ibuprofen 0700, flexeril at HS. Tramadol at 10am today. \" I haven't taken anything the rest of the day in case you guys had something more to offer me\" Discussed ed vs UC, would like UC. Informed pt of [facility] policy, and that narcotics may be used if the provider so chooses. He verbalized understanding. Work Comp  "

## 2017-02-26 NOTE — LETTER
[facility] EMERGENCY DEPARTMENT  [address] [address]  [phone]          February 26, 2017    RE:  Chin Jade                                                                                                                                                        [address]  [place] [address]            To whom it may concern:    Chin Jade was evaluated today in Urgent Care for acute myofacial pain of his right upper back/shoulder region.  He is to continue current work restrictions of no overhead reaching and no lifting greater than 10 pounds.  In addition, he may need to be off work due to pain until evaluated by his physician for further work restrictions.    Sincerely,         CHETNA Santiago CNP

## 2017-02-26 NOTE — ED AVS SNAPSHOT
[facility] Emergency Department    [address] [address]    Phone:  [phone]    Fax:  [phone]                                       Chin Jade   MRN: [id number]    Department:  [facility] Emergency Department   Date of Visit:  2/26/2017           Patient Information     Date Of Birth          1960        Your diagnoses for this visit were:     Acute myofascial pain        You were seen by Corina Mclaughlin APRN CNP.      Follow-up Information     Follow up with GIOVANNA Boles MD In 1 day.    Specialty:  Family Practice    Contact information:    [facility]  [address] [id number] [phone]          Discharge Instructions       Use ibuprofen 400-600 mg up to 4 times per day if needed for pain. Stop if it is causing nausea or abdominal pain.   Add Percocet 5-325 (oxycodone-acetaminophen) 1-2 pills up to every 6 hours if needed for pain. Do not use alcohol, operate machinery, drive, or climb on ladders for 8 hours after taking Percocet. Use docusate (100mg) 2 times a day to prevent constipation while on narcotics.  Contact Dr. Boles and/or [facility] doctor regarding further pain management and plan.  Continue physical therapy.  Continue ice/heat to region of pain.  Continue work-restrictions with the addition of time off if pain is not tolerable with Ibuprofen, and requiring narcotic for pain control.    Opioid Medication Information    Pain medications are among the most commonly prescribed medicines, so we are including this information for all our patients. If you did not receive pain medication or get a prescription for pain medicine, you can ignore it.     You may have been given a prescription for an opioid (narcotic) pain medicine and/or have received a pain medicine while here in the Emergency Department. These medicines can make you drowsy or impaired. You must not drive, operate dangerous equipment, or engage in  any other dangerous activities while taking these medications. If you drive while taking these medications, you could be arrested for DUI, or driving under the influence. Do not drink any alcohol while you are taking these medications.     Opioid pain medications can cause addiction. If you have a history of chemical dependency of any type, you are at a higher risk of becoming addicted to pain medications.  Only take these prescribed medications to treat your pain when all other options have been tried. Take it for as short a time and as few doses as possible. Store your pain pills in a secure place, as they are frequently stolen and provide a dangerous opportunity for children or visitors in your house to start abusing these powerful medications. We will not replace any lost or stolen medicine.  As soon as your pain is better, you should flush all your remaining medication.     Many prescription pain medications contain Tylenol  (acetaminophen), including Vicodin , Tylenol #3 , Norco , Lortab , and Percocet .  You should not take any extra pills of Tylenol  if you are using these prescription medications or you can get very sick.  Do not ever take more than 3000 mg of acetaminophen in any 24 hour period.    All opioids tend to cause constipation. Drink plenty of water and eat foods that have a lot of fiber, such as fruits, vegetables, prune juice, apple juice and high fiber cereal.  Take a laxative if you don t move your bowels at least every other day. Miralax , Milk of Magnesia, Colace , or Senna  can be used to keep you regular.      Remember that you can always come back to the Emergency Department if you are not able to see your regular doctor in the amount of time listed above, if you get any new symptoms, or if there is anything that worries you.          Future Appointments        Provider Department Dept Phone Center    2/28/2017 3:00 PM Meagan Dela Cruz, PT [facility]  [phone] [facility]    3/3/2017 2:00 PM Meagan Dela Cruz, PT [facility] [phone] [facility]      24 Hour Appointment Hotline       To make an appointment at any [facility] ([facility]), call [phone] ([phone]). If you don't have a family doctor or clinic, we will help you find one. [facility] are conveniently located to serve the needs of you and your family.             Review of your medicines      START taking        Dose / Directions Last dose taken    cyclobenzaprine 10 MG tablet   Commonly known as:  FLEXERIL   Dose:  10 mg   Quantity:  20 tablet        Take 1 tablet (10 mg) by mouth 3 times daily as needed for muscle spasms   Refills:  0        oxyCODONE-acetaminophen 5-325 MG per tablet   Commonly known as:  PERCOCET   Dose:  1-2 tablet   Quantity:  20 tablet        Take 1-2 tablets by mouth every 6 hours as needed for moderate to severe pain   Refills:  0          Our records show that you are taking the medicines listed below. If these are incorrect, please call your family doctor or clinic.        Dose / Directions Last dose taken    aspirin 81 MG tablet   Dose:  81 mg   Quantity:  100 tablet        Take 1 tablet (81 mg) by mouth daily   Refills:  3        atorvastatin 40 MG tablet   Commonly known as:  LIPITOR   Dose:  40 mg   Quantity:  90 tablet        Take 1 tablet (40 mg) by mouth daily   Refills:  3        blood glucose monitoring meter device kit   Quantity:  1 kit        Use to test blood sugars 2 times daily or as directed.   Refills:  0        blood glucose monitoring test strip   Commonly known as:  BL TEST STRIP PACK   Quantity:  1 Box        Free Style Freedom Lite Test Stips; test 2 times daily   Refills:  11        glimepiride 4 MG tablet   Commonly known as:  AMARYL   Dose:  2 mg   Indication:  One tab in the morning and 1/2 tablet at night   Quantity:  30 tablet        Take 2 mg by mouth every morning (before breakfast)   Refills:  2        lisinopril 10  MG tablet   Commonly known as:  PRINIVIL/ZESTRIL   Dose:  10 mg   Quantity:  90 tablet        Take 1 tablet (10 mg) by mouth daily   Refills:  3        metFORMIN 500 MG tablet   Commonly known as:  GLUCOPHAGE   Dose:  1000 mg   Quantity:  120 tablet        Take 2 tablets (1,000 mg) by mouth 2 times daily (with meals)   Refills:  2        order for DME   Quantity:  4 Units        Equipment being ordered: 2 pair of diabetic shoes and 3 sets of shoe inserts   Refills:  0        sildenafil 100 MG cap/tab   Commonly known as:  VIAGRA   Dose:   mg   Quantity:  6 tablet        Take 0.5-1 tablets ( mg) by mouth daily as needed for erectile dysfunction Take 30 min to 4 hours before intercourse.  Never use with nitroglycerin, terazosin or doxazosin.   Refills:  11          STOP taking        Dose Reason for stopping Comments    HYDROcodone-acetaminophen  MG per tablet   Commonly known as:  NORCO                      Prescriptions were sent or printed at these locations (2 Prescriptions)                   Other Prescriptions                Printed at Department/Unit printer (2 of 2)         cyclobenzaprine (FLEXERIL) 10 MG tablet               oxyCODONE-acetaminophen (PERCOCET) 5-325 MG per tablet                Orders Needing Specimen Collection     None      Pending Results     No orders found from 2/24/2017 to 2/27/2017.            Pending Culture Results     No orders found from 2/24/2017 to 2/27/2017.             Test Results from your hospital stay            Thank you for choosing [facility]       Thank you for choosing [facility] for your care. Our goal is always to provide you with excellent care. Hearing back from our patients is one way we can continue to improve our services. Please take a few minutes to complete the written survey that you may receive in the mail after you visit with us. Thank you!        [facility] Information     [facility] gives you secure access to your electronic health record. If you  see a primary care provider, you can also send messages to your care team and make appointments. If you have questions, please call your primary care clinic.  If you do not have a primary care provider, please call [phone] and they will assist you.        Care EveryWhere ID     This is your Care EveryWhere ID. This could be used by other organizations to access your [place] medical records  [id number]        After Visit Summary       This is your record. Keep this with you and show to your community pharmacist(s) and doctor(s) at your next visit.

## 2017-02-26 NOTE — DISCHARGE INSTRUCTIONS
Use ibuprofen 400-600 mg up to 4 times per day if needed for pain. Stop if it is causing nausea or abdominal pain.   Add Percocet 5-325 (oxycodone-acetaminophen) 1-2 pills up to every 6 hours if needed for pain. Do not use alcohol, operate machinery, drive, or climb on ladders for 8 hours after taking Percocet. Use docusate (100mg) 2 times a day to prevent constipation while on narcotics.  Contact Dr. Boles and/or [facility] doctor regarding further pain management and plan.  Continue physical therapy.  Continue ice/heat to region of pain.  Continue work-restrictions with the addition of time off if pain is not tolerable with Ibuprofen, and requiring narcotic for pain control.    Opioid Medication Information    Pain medications are among the most commonly prescribed medicines, so we are including this information for all our patients. If you did not receive pain medication or get a prescription for pain medicine, you can ignore it.     You may have been given a prescription for an opioid (narcotic) pain medicine and/or have received a pain medicine while here in the Emergency Department. These medicines can make you drowsy or impaired. You must not drive, operate dangerous equipment, or engage in any other dangerous activities while taking these medications. If you drive while taking these medications, you could be arrested for DUI, or driving under the influence. Do not drink any alcohol while you are taking these medications.     Opioid pain medications can cause addiction. If you have a history of chemical dependency of any type, you are at a higher risk of becoming addicted to pain medications.  Only take these prescribed medications to treat your pain when all other options have been tried. Take it for as short a time and as few doses as possible. Store your pain pills in a secure place, as they are frequently stolen and provide a dangerous opportunity for children or visitors in your house to start abusing  these powerful medications. We will not replace any lost or stolen medicine.  As soon as your pain is better, you should flush all your remaining medication.     Many prescription pain medications contain Tylenol  (acetaminophen), including Vicodin , Tylenol #3 , Norco , Lortab , and Percocet .  You should not take any extra pills of Tylenol  if you are using these prescription medications or you can get very sick.  Do not ever take more than 3000 mg of acetaminophen in any 24 hour period.    All opioids tend to cause constipation. Drink plenty of water and eat foods that have a lot of fiber, such as fruits, vegetables, prune juice, apple juice and high fiber cereal.  Take a laxative if you don t move your bowels at least every other day. Miralax , Milk of Magnesia, Colace , or Senna  can be used to keep you regular.      Remember that you can always come back to the Emergency Department if you are not able to see your regular doctor in the amount of time listed above, if you get any new symptoms, or if there is anything that worries you.

## 2017-02-28 ENCOUNTER — HOSPITAL ENCOUNTER (OUTPATIENT)
Dept: PHYSICAL THERAPY | Facility: CLINIC | Age: 57
Setting detail: THERAPIES SERIES
End: 2017-02-28
Attending: PREVENTIVE MEDICINE
Payer: COMMERCIAL

## 2017-02-28 PROCEDURE — 97035 APP MDLTY 1+ULTRASOUND EA 15: CPT | Mod: GP | Performed by: PHYSICAL THERAPIST

## 2017-02-28 PROCEDURE — 40000718 ZZHC STATISTIC PT DEPARTMENT ORTHO VISIT: Performed by: PHYSICAL THERAPIST

## 2017-03-02 ENCOUNTER — OFFICE VISIT (OUTPATIENT)
Dept: FAMILY MEDICINE | Facility: CLINIC | Age: 57
End: 2017-03-02
Payer: COMMERCIAL

## 2017-03-02 VITALS
DIASTOLIC BLOOD PRESSURE: 84 MMHG | BODY MASS INDEX: 30.67 KG/M2 | WEIGHT: 239 LBS | HEART RATE: 107 BPM | OXYGEN SATURATION: 99 % | RESPIRATION RATE: 18 BRPM | SYSTOLIC BLOOD PRESSURE: 138 MMHG | HEIGHT: 74 IN

## 2017-03-02 DIAGNOSIS — M50.30 DDD (DEGENERATIVE DISC DISEASE), CERVICAL: ICD-10-CM

## 2017-03-02 DIAGNOSIS — M54.12 CERVICAL RADICULOPATHY: Primary | ICD-10-CM

## 2017-03-02 DIAGNOSIS — M99.71 CONNECTIVE TISSUE AND DISC STENOSIS OF INTERVERTEBRAL FORAMINA OF CERVICAL REGION: ICD-10-CM

## 2017-03-02 PROCEDURE — 99214 OFFICE O/P EST MOD 30 MIN: CPT | Performed by: FAMILY MEDICINE

## 2017-03-02 RX ORDER — GABAPENTIN 300 MG/1
CAPSULE ORAL
Qty: 90 CAPSULE | Refills: 0 | Status: SHIPPED | OUTPATIENT
Start: 2017-03-02 | End: 2017-09-14

## 2017-03-02 ASSESSMENT — PAIN SCALES - GENERAL: PAINLEVEL: EXTREME PAIN (8)

## 2017-03-02 NOTE — NURSING NOTE
"Chief Complaint   Patient presents with     Shoulder right     Patient was seen by PCP and in Urgent Care X 2. Patient was uncertain if it is work comp due to no specific injury.        Initial /84  Pulse 107  Resp 18  Ht 6' 2\" (1.88 m)  Wt 239 lb (108.4 kg)  SpO2 99%  BMI 30.69 kg/m2 Estimated body mass index is 30.69 kg/(m^2) as calculated from the following:    Height as of this encounter: 6' 2\" (1.88 m).    Weight as of this encounter: 239 lb (108.4 kg).  Medication Reconciliation: complete    "

## 2017-03-02 NOTE — PROGRESS NOTES
"  SUBJECTIVE:                                                    Chin Jade is a 57 year old male who presents to clinic today for the following health issues:      ED/UC Followup:    Facility:  [facility]  Date of visit:  1/30/17 and 2/26/17,  Reason for visit: acute myofascial pain   Current Status: pain currently rated at a 8/10. Patient has been going to PT and did ultrasound therapy which has increased shoulder pain. Patient is currently unable to work due to pain.      ER notes, PT notes have been reviewed.  Patient tells me that US in PT made pain worse.  Feels the numbness/tingling is worse and his strength of the right hand is worsening.     Not able to sleep well.     /84  Pulse 107  Resp 18  Ht 6' 2\" (1.88 m)  Wt 239 lb (108.4 kg)  SpO2 99%  BMI 30.69 kg/m2  EXAM: GENERAL APPEARANCE ADULT: Alert, no acute distress  MS: neck exam: impaired movement of neck, tenderness at right upper trapezius, normal neurological exam of arms; normal DTR's.  Decreased sensation of the right lateral wrist/little finger (C8 distribution).    strength decreased on the right.     ASSESSMENT/PLAN:      ICD-10-CM    1. Cervical radiculopathy M54.12 MR Cervical Spine w/o Contrast     gabapentin (NEURONTIN) 300 MG capsule     Will add in gabapentin for the neuropathic pain.  Risks, benefits and alternatives discussed    MRI given the progression of symptoms and sensory/motor changes.   Patient agrees.    Patient Instructions     Gabapentin as prescribed    Schedule the MRI in [place]    Leia Isabel M.D.      Thank you for choosing [facility].  You may be receiving a survey in the mail from [facility] regarding your visit today.  Please take a few minutes to complete and return the survey to let us know how we are doing.      Our Clinic hours are:  Mondays    7:20 am - 7 pm  Tues -  Fri  7:20 am - 5 pm    Clinic Phone: [phone]    The clinic lab opens at 7:30 am Mon - Fri and appointments are " required.    [facility]  Ph. [phone]  Monday-Thursday 8 am - 7pm  Tues/Wed/Fri 8 am - 5:30 pm

## 2017-03-02 NOTE — MR AVS SNAPSHOT
After Visit Summary   3/2/2017    Chin Jade    MRN: [id number]           Patient Information     Date Of Birth          1960        Visit Information        Provider Department      3/2/2017 2:40 PM Leia Isabel MD [facility]        Today's Diagnoses     Cervical radiculopathy    -  1      Care Instructions      Gabapentin as prescribed    Schedule the MRI in [place]        Thank you for choosing [facility].  You may be receiving a survey in the mail from [facility] regarding your visit today.  Please take a few minutes to complete and return the survey to let us know how we are doing.      Our Clinic hours are:  Mondays    7:20 am - 7 pm  Tues -  Fri  7:20 am - 5 pm    Clinic Phone: [phone]    The clinic lab opens at 7:30 am Mon - Fri and appointments are required.    [facility]  Ph. [phone]  Monday-Thursday 8 am - 7pm  Tues/Wed/Fri 8 am - 5:30 pm               Follow-ups after your visit        Your next 10 appointments already scheduled     Mar 03, 2017  2:00 PM CST   Treatment with Meagan Dela Cruz, PT   [facility] ([facility])    [address] [address]   [phone]            Mar 08, 2017  3:00 PM CST   Treatment with Dale Rich, PT   [facility] ([facility])    [address] [address]   [phone]              Future tests that were ordered for you today     Open Future Orders        Priority Expected Expires Ordered    MR Cervical Spine w/o Contrast Routine  3/2/2018 3/2/2017            Who to contact     If you have questions or need follow up information about today's clinic visit or your schedule please contact [facility] directly at [phone].  Normal or non-critical lab and imaging results will be communicated to you by MyChart, letter or phone within 4 business  "days after the clinic has received the results. If you do not hear from us within 7 days, please contact the clinic through [facility] or phone. If you have a critical or abnormal lab result, we will notify you by phone as soon as possible.  Submit refill requests through [facility] or call your pharmacy and they will forward the refill request to us. Please allow 3 business days for your refill to be completed.          Additional Information About Your Visit        [facility] Information     [facility] gives you secure access to your electronic health record. If you see a primary care provider, you can also send messages to your care team and make appointments. If you have questions, please call your primary care clinic.  If you do not have a primary care provider, please call [phone] and they will assist you.        Care EveryWhere ID     This is your Care EveryWhere ID. This could be used by other organizations to access your [place] medical records  [id number]        Your Vitals Were     Pulse Respirations Height Pulse Oximetry BMI (Body Mass Index)       107 18 6' 2\" (1.88 m) 99% 30.69 kg/m2        Blood Pressure from Last 3 Encounters:   03/02/17 138/84   02/26/17 189/87   01/31/17 138/82    Weight from Last 3 Encounters:   03/02/17 239 lb (108.4 kg)   02/26/17 240 lb (108.9 kg)   01/31/17 252 lb (114.3 kg)                 Today's Medication Changes          These changes are accurate as of: 3/2/17  3:04 PM.  If you have any questions, ask your nurse or doctor.               Start taking these medicines.        Dose/Directions    gabapentin 300 MG capsule   Commonly known as:  NEURONTIN   Used for:  Cervical radiculopathy   Started by:  Leia Isabel MD        Take 1 tablet (300 mg) every night for 1-3 days, then 1 tablet twice daily for 1-3 days, then 1 tablet three times daily   Quantity:  90 capsule   Refills:  0            Where to get your medicines      These medications were sent to [facility] " [place] - [address]  [address] [address]     Phone:  [phone]     gabapentin 300 MG capsule                Primary Care Provider Office Phone # Fax #    R Marquis Boles [phone]       [facility] [address] [address]        Thank you!     Thank you for choosing [facility]  for your care. Our goal is always to provide you with excellent care. Hearing back from our patients is one way we can continue to improve our services. Please take a few minutes to complete the written survey that you may receive in the mail after your visit with us. Thank you!             Your Updated Medication List - Protect others around you: Learn how to safely use, store and throw away your medicines at www.disposemymeds.org.          This list is accurate as of: 3/2/17  3:04 PM.  Always use your most recent med list.                   Brand Name Dispense Instructions for use    aspirin 81 MG tablet     100 tablet    Take 1 tablet (81 mg) by mouth daily       atorvastatin 40 MG tablet    LIPITOR    90 tablet    Take 1 tablet (40 mg) by mouth daily       blood glucose monitoring meter device kit     1 kit    Use to test blood sugars 2 times daily or as directed.       blood glucose monitoring test strip    BL TEST STRIP PACK    1 Box    Free Style Freedom Lite Test Stips; test 2 times daily       cyclobenzaprine 10 MG tablet    FLEXERIL    20 tablet    Take 1 tablet (10 mg) by mouth 3 times daily as needed for muscle spasms       gabapentin 300 MG capsule    NEURONTIN    90 capsule    Take 1 tablet (300 mg) every night for 1-3 days, then 1 tablet twice daily for 1-3 days, then 1 tablet three times daily       glimepiride 4 MG tablet    AMARYL    30 tablet    Take 2 mg by mouth every morning (before breakfast)       lisinopril 10 MG tablet    PRINIVIL/ZESTRIL    90 tablet    Take 1 tablet (10 mg) by  mouth daily       metFORMIN 500 MG tablet    GLUCOPHAGE    120 tablet    Take 2 tablets (1,000 mg) by mouth 2 times daily (with meals)       order for DME     4 Units    Equipment being ordered: 2 pair of diabetic shoes and 3 sets of shoe inserts       oxyCODONE-acetaminophen 5-325 MG per tablet    PERCOCET    20 tablet    Take 1-2 tablets by mouth every 6 hours as needed for moderate to severe pain

## 2017-03-03 ENCOUNTER — HOSPITAL ENCOUNTER (OUTPATIENT)
Dept: PHYSICAL THERAPY | Facility: CLINIC | Age: 57
Setting detail: THERAPIES SERIES
End: 2017-03-03
Attending: PREVENTIVE MEDICINE
Payer: OTHER MISCELLANEOUS

## 2017-03-03 DIAGNOSIS — E11.42 TYPE 2 DIABETES MELLITUS WITH DIABETIC POLYNEUROPATHY, UNSPECIFIED LONG TERM INSULIN USE STATUS: Primary | ICD-10-CM

## 2017-03-03 PROCEDURE — 97140 MANUAL THERAPY 1/> REGIONS: CPT | Mod: GP | Performed by: PHYSICAL THERAPIST

## 2017-03-03 PROCEDURE — 40000718 ZZHC STATISTIC PT DEPARTMENT ORTHO VISIT: Performed by: PHYSICAL THERAPIST

## 2017-03-03 RX ORDER — BLOOD-GLUCOSE METER
EACH MISCELLANEOUS
Qty: 1 KIT | Refills: 0 | Status: SHIPPED | OUTPATIENT
Start: 2017-03-03 | End: 2019-10-31

## 2017-03-03 NOTE — TELEPHONE ENCOUNTER
Patient's wife is calling to see if patient can get an order for AccuChek connect meter and AccuChek connect test strips.  LOV 3/2/17.    Routing to provider.    Cira BURNS Rn

## 2017-03-05 ENCOUNTER — HOSPITAL ENCOUNTER (OUTPATIENT)
Dept: MRI IMAGING | Facility: CLINIC | Age: 57
Discharge: HOME | End: 2017-03-05
Attending: FAMILY MEDICINE | Admitting: FAMILY MEDICINE
Payer: COMMERCIAL

## 2017-03-05 DIAGNOSIS — M54.12 CERVICAL RADICULOPATHY: ICD-10-CM

## 2017-03-05 PROCEDURE — 72141 MRI NECK SPINE W/O DYE: CPT

## 2017-03-06 PROBLEM — M99.71 CONNECTIVE TISSUE AND DISC STENOSIS OF INTERVERTEBRAL FORAMINA OF CERVICAL REGION: Status: ACTIVE | Noted: 2017-03-06

## 2017-03-06 PROBLEM — M50.30 DDD (DEGENERATIVE DISC DISEASE), CERVICAL: Status: ACTIVE | Noted: 2017-03-06

## 2017-03-07 ENCOUNTER — MYC MEDICAL ADVICE (OUTPATIENT)
Dept: FAMILY MEDICINE | Facility: CLINIC | Age: 57
End: 2017-03-07

## 2017-03-07 NOTE — TELEPHONE ENCOUNTER
Pt asking if it is ok to continue working considering the MRI results?  He is on light duty, no lifting and pain is decreased to just a back ache.  Also asking if he should start PT? Has appt Wed afternoon?  Advise.Glo

## 2017-03-08 ENCOUNTER — HOSPITAL ENCOUNTER (OUTPATIENT)
Dept: PHYSICAL THERAPY | Facility: CLINIC | Age: 57
Setting detail: THERAPIES SERIES
End: 2017-03-08
Attending: PREVENTIVE MEDICINE
Payer: OTHER MISCELLANEOUS

## 2017-03-08 PROCEDURE — 97140 MANUAL THERAPY 1/> REGIONS: CPT | Mod: GP | Performed by: PHYSICAL THERAPIST

## 2017-03-08 PROCEDURE — 97535 SELF CARE MNGMENT TRAINING: CPT | Mod: GP | Performed by: PHYSICAL THERAPIST

## 2017-03-08 PROCEDURE — 40000718 ZZHC STATISTIC PT DEPARTMENT ORTHO VISIT: Performed by: PHYSICAL THERAPIST

## 2017-03-14 ENCOUNTER — HOSPITAL ENCOUNTER (OUTPATIENT)
Dept: PHYSICAL THERAPY | Facility: CLINIC | Age: 57
Setting detail: THERAPIES SERIES
End: 2017-03-14
Attending: PREVENTIVE MEDICINE
Payer: OTHER MISCELLANEOUS

## 2017-03-14 PROCEDURE — 97110 THERAPEUTIC EXERCISES: CPT | Mod: GP | Performed by: PHYSICAL THERAPIST

## 2017-03-14 PROCEDURE — 97012 MECHANICAL TRACTION THERAPY: CPT | Mod: GP | Performed by: PHYSICAL THERAPIST

## 2017-03-14 PROCEDURE — 40000718 ZZHC STATISTIC PT DEPARTMENT ORTHO VISIT: Performed by: PHYSICAL THERAPIST

## 2017-03-17 ENCOUNTER — HOSPITAL ENCOUNTER (OUTPATIENT)
Dept: PHYSICAL THERAPY | Facility: CLINIC | Age: 57
Setting detail: THERAPIES SERIES
End: 2017-03-17
Attending: PREVENTIVE MEDICINE
Payer: OTHER MISCELLANEOUS

## 2017-03-17 PROCEDURE — 40000718 ZZHC STATISTIC PT DEPARTMENT ORTHO VISIT: Performed by: PHYSICAL THERAPIST

## 2017-03-17 PROCEDURE — 97012 MECHANICAL TRACTION THERAPY: CPT | Mod: GP | Performed by: PHYSICAL THERAPIST

## 2017-03-22 ENCOUNTER — HOSPITAL ENCOUNTER (OUTPATIENT)
Dept: PHYSICAL THERAPY | Facility: CLINIC | Age: 57
Setting detail: THERAPIES SERIES
End: 2017-03-22
Attending: PREVENTIVE MEDICINE
Payer: OTHER MISCELLANEOUS

## 2017-03-22 ENCOUNTER — DOCUMENTATION ONLY (OUTPATIENT)
Dept: ENDOCRINOLOGY | Facility: CLINIC | Age: 57
End: 2017-03-22

## 2017-03-22 PROCEDURE — 97110 THERAPEUTIC EXERCISES: CPT | Mod: GP | Performed by: PHYSICAL THERAPIST

## 2017-03-22 PROCEDURE — 97012 MECHANICAL TRACTION THERAPY: CPT | Mod: GP | Performed by: PHYSICAL THERAPIST

## 2017-03-22 PROCEDURE — 40000718 ZZHC STATISTIC PT DEPARTMENT ORTHO VISIT: Performed by: PHYSICAL THERAPIST

## 2017-03-22 NOTE — PROGRESS NOTES
GRADE study visit    Patient is here for 27 month GRADE study visit. Patient is doing well and continues on metformin 1000 mg bid in addition to randomized treatment of Amaryl, now 2 mg am and 1 mg pm.  Patient is tolerating medications and has been having no side effects. Patient is testing blood sugars every morning with a range of 110-140 for the past 2 weeks. Only rare symptomatic hypoglycemia; always easily recognized and treated.    Exam:  /80 and 158/80, Weight 109.0 Kg    Lab results:   A1c: 6.7    IMP/Plan:   1. Diabetes:  At target.  Will continue current regimen.   2. BP high today; will ask him to monitor.  3. Follow up in 3 months, sooner with concerns.     Harsha Gallegos MD  [facility]  Department of Medicine  Division of Endocrinology and Diabetes

## 2017-03-24 LAB — HBA1C MFR BLD: 6.7 % (ref 0–5.7)

## 2017-03-29 ENCOUNTER — HOSPITAL ENCOUNTER (OUTPATIENT)
Dept: PHYSICAL THERAPY | Facility: CLINIC | Age: 57
Setting detail: THERAPIES SERIES
End: 2017-03-29
Attending: PREVENTIVE MEDICINE
Payer: OTHER MISCELLANEOUS

## 2017-03-29 PROCEDURE — 40000718 ZZHC STATISTIC PT DEPARTMENT ORTHO VISIT: Performed by: PHYSICAL THERAPIST

## 2017-03-29 PROCEDURE — 97110 THERAPEUTIC EXERCISES: CPT | Mod: GP | Performed by: PHYSICAL THERAPIST

## 2017-03-29 PROCEDURE — 97012 MECHANICAL TRACTION THERAPY: CPT | Mod: GP | Performed by: PHYSICAL THERAPIST

## 2017-04-07 ENCOUNTER — HOSPITAL ENCOUNTER (OUTPATIENT)
Dept: PHYSICAL THERAPY | Facility: CLINIC | Age: 57
Setting detail: THERAPIES SERIES
End: 2017-04-07
Attending: PREVENTIVE MEDICINE
Payer: OTHER MISCELLANEOUS

## 2017-04-07 PROCEDURE — 97110 THERAPEUTIC EXERCISES: CPT | Mod: GP | Performed by: PHYSICAL THERAPIST

## 2017-04-07 PROCEDURE — 40000718 ZZHC STATISTIC PT DEPARTMENT ORTHO VISIT: Performed by: PHYSICAL THERAPIST

## 2017-04-07 NOTE — PROGRESS NOTES
Chin Jade  1960  Harsha Qureshi MD  [facility]  [address] [place] [address]  PCP:  Dr GIOVANNA Cho Post  Diagnosis:  R shoulder pain; ulnar neuritis Physical Therapy Progress Note  04/07/17 1500   Signing Clinician's Name / Credentials   Signing clinician's name / credentials Meagan BlankenshipSandra PT, DPT   Session Number   Session Number 11 total; since SOC on 2/17/2017   Progress Note/Recertification   Progress Note Due Date 04/14/17   Progress Note Completed Date 04/07/17   Adult Goals   PT Ortho Eval Goals 2;3;4;5   Ortho Goal 1   Goal Description abolish pain   Target Date 03/31/17   Date Met 03/29/17   Ortho Goal 2   Goal Description improve  strength to age normal  (gradually increasing)   Target Date 04/14/17   Ortho Goal 3   Goal Description decrease pain to allow sleeping through the night (without being awakened by UB or arm Pain) in bed in order to achieve restful sleep to heal  (able to get back to sleep right away 3/29)   Target Date 03/17/17   Date Met 04/07/17   Ortho Goal 4   Goal Description Reach to endrange R shoulder flxn without nerve pain   Target Date 03/17/17   Date Met 03/14/17   Ortho Goal 5   Goal Description Independent in [facility] for strength and posture 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   Target Date 04/14/17   Date Met 04/07/17   Ortho Goal 6   Goal Description abolish numbness in R UE  (continues to resolve 4/7)   Target Date 05/15/17   Subjective Report   Subjective Report Continues to have no pain and decreasing numbness; able to feel medial edge of 5th finger; still feels clumbsy with fine motor; struggles opening  pop bottle    Objective Measure 1   Objective Measure    Details 32 kg repeatedly   Objective Measure 2   Objective Measure Pain now   Details 0/10   Objective Measure 3   Objective Measure CAROM   Details extn 65; flxn 43: rotation 73 L; 68 R   Therapeutic Procedure/exercise   Minutes 25   Skilled  "Intervention exercise instruction and education with tactile and verbal cueing for proper technique to increase strength, improve posture and decrease radicular symptoms   Patient Response good scapular stability with progressionof exercises   Treatment Detail instructed in PNF D2 blue band;  knee push up progress to full pushup as able; reviewed and encouraged to continue with other strengthening lat pull; horiz abd and shoulder extn all for scap stability; continue ulnar nerve floss; continue with theraputty issued last visit   Progress progressed strengthening   Education   Learner Patient   Readiness Acceptance   Method Booklet/handout;Explanation;Demonstration   Response Verbalizes Understanding;Demonstrates Understanding   Education Comments VHI   Plan   Home program added above progression; continue previous   Updates to plan of care Pt seeing Dr Roach on Monday.  Will DC PT if no other recommendations by Dr Roach   Comments   Comments Pt requesting \"maintenance traction\". Explained he likely received the benefit of mechanical C traction earlier in treatment course and it is no longer needed as symptoms no longer change after C traction;  Posture remains with post pelvic tilt, rounded shoulders and forward head despite education and cueing   Total Session Time   Total Session Time 25     "

## 2017-05-23 ENCOUNTER — ALLIED HEALTH/NURSE VISIT (OUTPATIENT)
Dept: FAMILY MEDICINE | Facility: CLINIC | Age: 57
End: 2017-05-23
Payer: COMMERCIAL

## 2017-05-23 DIAGNOSIS — H93.90 EAR PROBLEM: Primary | ICD-10-CM

## 2017-05-23 PROCEDURE — 99207 ZZC NO CHARGE NURSE ONLY: CPT

## 2017-05-23 NOTE — NURSING NOTE
Pt states that he is sure he has a rubber ear bud stuck in his ear.   Writer is unable to see object or see ear drum.   Writer huddled with provider - provider looked in ear and states that he does not see the object and   Has clear vision of ear drum.   Pt was excused from clinic with no c/o of hearing.   Pt instructed to return to clinic should he have pain, hearing defect or drainage.   Pt verbalized understanding and had no further questions at this time.   Encounter closed.   Jo CHAND RN

## 2017-05-23 NOTE — MR AVS SNAPSHOT
After Visit Summary   5/23/2017    Chin Jade    MRN: [id number]           Patient Information     Date Of Birth          1960        Visit Information        Provider Department      5/23/2017 3:45 PM FL CL RN [facility]        Today's Diagnoses     Ear problem    -  1       Follow-ups after your visit        Who to contact     If you have questions or need follow up information about today's clinic visit or your schedule please contact [facility] directly at [phone].  Normal or non-critical lab and imaging results will be communicated to you by BasisCodehart, letter or phone within 4 business days after the clinic has received the results. If you do not hear from us within 7 days, please contact the clinic through BasisCodehart or phone. If you have a critical or abnormal lab result, we will notify you by phone as soon as possible.  Submit refill requests through [employer] or call your pharmacy and they will forward the refill request to us. Please allow 3 business days for your refill to be completed.          Additional Information About Your Visit        MyChart Information     [employer] gives you secure access to your electronic health record. If you see a primary care provider, you can also send messages to your care team and make appointments. If you have questions, please call your primary care clinic.  If you do not have a primary care provider, please call [phone] and they will assist you.        Care EveryWhere ID     This is your Care EveryWhere ID. This could be used by other organizations to access your [facility] medical records  [id number]         Blood Pressure from Last 3 Encounters:   03/02/17 138/84   02/26/17 189/87   01/31/17 138/82    Weight from Last 3 Encounters:   03/02/17 239 lb (108.4 kg)   02/26/17 240 lb (108.9 kg)   01/31/17 252 lb (114.3 kg)              Today, you had the following     No orders found for display        Primary Care Provider Office Phone # Fax #    GIOVANNA Marquis Boles [phone]       [address] [address]        Thank you!     Thank you for choosing [facility] [place]  for your care. Our goal is always to provide you with excellent care. Hearing back from our patients is one way we can continue to improve our services. Please take a few minutes to complete the written survey that you may receive in the mail after your visit with us. Thank you!             Your Updated Medication List - Protect others around you: Learn how to safely use, store and throw away your medicines at www.disposemymeds.org.          This list is accurate as of: 5/23/17  4:14 PM.  Always use your most recent med list.                   Brand Name Dispense Instructions for use    aspirin 81 MG tablet     100 tablet    Take 1 tablet (81 mg) by mouth daily       atorvastatin 40 MG tablet    LIPITOR    90 tablet    Take 1 tablet (40 mg) by mouth daily       * blood glucose monitoring meter device kit     1 kit    Use to test blood sugars 2 times daily or as directed.       * blood glucose monitoring meter device kit     1 kit    Use to test blood sugars 4 times daily or as directed.       * blood glucose monitoring test strip    BL TEST STRIP PACK    1 Box    Free Style Freedom Lite Test Stips; test 2 times daily       * blood glucose monitoring test strip    no brand specified    100 each    Use to test blood sugar 4 times daily or as directed.       gabapentin 300 MG capsule    NEURONTIN    90 capsule    Take 1 tablet (300 mg) every night for 1-3 days, then 1 tablet twice daily for 1-3 days, then 1 tablet three times daily       glimepiride 4 MG tablet    AMARYL    30 tablet    Take 2 mg by mouth every morning (before breakfast)       lisinopril 10 MG tablet    PRINIVIL/ZESTRIL    90 tablet    Take 1 tablet (10 mg) by mouth daily       metFORMIN 500 MG tablet    GLUCOPHAGE     120 tablet    Take 2 tablets (1,000 mg) by mouth 2 times daily (with meals)       order for DME     4 Units    Equipment being ordered: 2 pair of diabetic shoes and 3 sets of shoe inserts       oxyCODONE-acetaminophen 5-325 MG per tablet    PERCOCET    20 tablet    Take 1-2 tablets by mouth every 6 hours as needed for moderate to severe pain       * Notice:  This list has 4 medication(s) that are the same as other medications prescribed for you. Read the directions carefully, and ask your doctor or other care provider to review them with you.

## 2017-06-28 ENCOUNTER — DOCUMENTATION ONLY (OUTPATIENT)
Dept: ENDOCRINOLOGY | Facility: CLINIC | Age: 57
End: 2017-06-28

## 2017-06-28 NOTE — PROGRESS NOTES
GRADE study visit    Patient is here for 30 month GRADE study visit. Patient is doing well and continues on Metformin 1000 mg bid in addition to randomized treatment of Amaryl, 2 mg am and 1 mg pm.  Patient is tolerating medications and has been having no side effects. Patient is testing blood sugars every morning with a range of  for the past 2 weeks.     Lab results:   A1c: 7.0  B12 554  Urine albumin: 62 mg/g creat.    IMP/Plan:   1. Diabetes:  Not quite at target.  Will increase Amaryl to 2 mg twice daily; focus on caloric reduction, exercise and weight loss..   2. Possible nephropathy:  Increase lisinopril to 20 mg daily.  3. Follow up in 3 months, sooner with concerns.     Harsha Gallegos MD  [facility]  Department of Medicine  Division of Endocrinology and Diabetes

## 2017-06-29 DIAGNOSIS — E11.42 TYPE 2 DIABETES MELLITUS WITH DIABETIC POLYNEUROPATHY, WITHOUT LONG-TERM CURRENT USE OF INSULIN (H): Primary | ICD-10-CM

## 2017-06-29 LAB
ALBUMIN URINE MG/G CR: 62 MG/G CREATININE
ALBUMIN URINE MG/SPEC: NORMAL
CREATININE URINE: NORMAL
HBA1C MFR BLD: 7 % (ref 0–5.7)
VIT B12 SERPL-MCNC: 554 PG/ML

## 2017-06-29 RX ORDER — LISINOPRIL 20 MG/1
20 TABLET ORAL DAILY
Qty: 90 TABLET | Refills: 3 | Status: SHIPPED | OUTPATIENT
Start: 2017-06-29 | End: 2018-07-30

## 2017-09-14 ENCOUNTER — OFFICE VISIT (OUTPATIENT)
Dept: FAMILY MEDICINE | Facility: CLINIC | Age: 57
End: 2017-09-14
Payer: COMMERCIAL

## 2017-09-14 VITALS
SYSTOLIC BLOOD PRESSURE: 110 MMHG | BODY MASS INDEX: 30.81 KG/M2 | DIASTOLIC BLOOD PRESSURE: 60 MMHG | HEART RATE: 84 BPM | TEMPERATURE: 99.5 F | WEIGHT: 240 LBS

## 2017-09-14 DIAGNOSIS — E08.42 DIABETIC POLYNEUROPATHY ASSOCIATED WITH DIABETES MELLITUS DUE TO UNDERLYING CONDITION (H): ICD-10-CM

## 2017-09-14 DIAGNOSIS — B00.1 RECURRENT HERPES LABIALIS: ICD-10-CM

## 2017-09-14 DIAGNOSIS — E78.5 HYPERLIPIDEMIA LDL GOAL <100: ICD-10-CM

## 2017-09-14 DIAGNOSIS — F17.200 TOBACCO USE DISORDER: ICD-10-CM

## 2017-09-14 DIAGNOSIS — E11.42 TYPE 2 DIABETES MELLITUS WITH DIABETIC POLYNEUROPATHY, WITHOUT LONG-TERM CURRENT USE OF INSULIN (H): Primary | ICD-10-CM

## 2017-09-14 PROCEDURE — 99214 OFFICE O/P EST MOD 30 MIN: CPT | Performed by: FAMILY MEDICINE

## 2017-09-14 RX ORDER — ACYCLOVIR 400 MG/1
400 TABLET ORAL
Qty: 30 TABLET | Refills: 5 | Status: SHIPPED | OUTPATIENT
Start: 2017-09-14 | End: 2018-10-05

## 2017-09-14 RX ORDER — ATORVASTATIN CALCIUM 40 MG/1
40 TABLET, FILM COATED ORAL DAILY
Qty: 90 TABLET | Refills: 3 | Status: SHIPPED | OUTPATIENT
Start: 2017-09-14 | End: 2018-10-01

## 2017-09-14 NOTE — PROGRESS NOTES
SUBJECTIVE:   Chin Jade is a 57 year old male who presents to clinic today for the following health issues:      Diabetes Follow-up    Patient is checking blood sugars: once daily.  Results are as follows:       am - 100-120    Diabetic concerns: None     Symptoms of hypoglycemia (low blood sugar): Rare but it does happen from time to time      Paresthesias (numbness or burning in feet) or sores: yes both feet     Date of last diabetic eye exam: Almost a year ago     Hyperlipidemia Follow-Up      Rate your low fat/cholesterol diet?: good    Taking statin?  Yes, no muscle aches from statin    Other lipid medications/supplements?:  none    Hypertension Follow-up      Outpatient blood pressures are not being checked.  Low Salt Diet: not monitoring salt      Amount of exercise or physical activity: 2-3 days/week for an average of 30-45 minutes    Problems taking medications regularly: No    Medication side effects: none  Diet: regular (no restrictions)      PROBLEMS TO ADD ON...  Tobacco use: He is not interested in trying any medications for tobacco cessation. He is willing to go to the [employer] website    Problem list and histories reviewed & adjusted, as indicated.  Additional history: none        Reviewed and updated as needed this visit by clinical staff     Reviewed and updated as needed this visit by Provider               ROS:  Constitutional, HEENT, cardiovascular, pulmonary, gi and gu systems are negative, except as otherwise noted.          OBJECTIVE:                                                    /60 (BP Location: Right arm, Patient Position: Chair, Cuff Size: Adult Large)  Pulse 84  Temp 99.5  F (37.5  C) (Tympanic)  Wt 240 lb (108.9 kg)  BMI 30.81 kg/m2    GENERAL: healthy, alert and no distress  EYES: Eyes grossly normal to inspection, extraocular movements - intact, and PERRL  NECK: no tenderness, no adenopathy, no asymmetry, no masses, no stiffness; thyroid- normal to  palpation  RESP: lungs clear to auscultation - no rales, no rhonchi, no wheezes  CV: regular rates and rhythm, normal S1 S2, no S3 or S4 and no murmur, no click or rub -  MS: extremities- no gross deformities noted, no edema  Diabetic foot exam: no trophic changes or ulcerative lesions and reduced sensation at both feet up to the ankle area with filament testing    Diagnostic test results:  Results for orders placed or performed in visit on 06/28/17   Hemoglobin A1c   Result Value Ref Range    Hemoglobin A1C 7.0 %   Albumin Random Urine Quantitative   Result Value Ref Range    Albumin Urine mg/spec      Albumin Urine mg/g Cr 62 MG/G Creatinine    Creatinine Urine     Vitamin B12   Result Value Ref Range    Vitamin B12 554 pg/mL          ASSESSMENT/PLAN:                                                    ASSESSMENT:  1. Type 2 diabetes mellitus with diabetic polyneuropathy, without long-term current use of insulin (H)    2. Hyperlipidemia LDL goal <100    3. Recurrent herpes labialis    4. Diabetic polyneuropathy associated with diabetes mellitus due to underlying condition (H)    5. Tobacco use disorder        PLAN:  Orders Placed This Encounter     acyclovir (ZOVIRAX) 400 MG tablet     atorvastatin (LIPITOR) 40 MG tablet       Patient Instructions   Diabetes Plan  1. Hemoglobin A1c goal is between 7% and 8%  Your Hemoglobin A1c is at goal  Plan is:Continue medications at current doses  2. LDL (bad cholesterol) goal is less than 100  Your LDL is at goal  Plan is: Continue medications at current doses  3. Blood pressure goal is less than 140/90.  Your blood pressure is at goal.  Plan is: Continue medications at current doses    4. Other: Smoking cessation is recommended      Recheck in 6 months.      Your care team recommends that you complete a free, online smoking cessation program designed to provide you with personalized strategies to help you quit.      The online tool, called Novu, is an interactive wellness  program that will walk you through the steps needed to quit smoking and put you on a path for healthy living.  Through a simple registration, [employer] customizes itself to your unique needs and provides information only you are interested in receiving.  If you are interested in starting your journey to quit smoking, register using this link:    [email or web address]  [facility]

## 2017-09-14 NOTE — MR AVS SNAPSHOT
After Visit Summary   9/14/2017    Chin Jade    MRN: [id number]           Patient Information     Date Of Birth          1960        Visit Information        Provider Department      9/14/2017 4:00 PM Ramana, GIOVANNA Cho MD [facility]        Today's Diagnoses     Recurrent herpes labialis    -  1    Hyperlipidemia LDL goal <100          Care Instructions    Diabetes Plan  1. Hemoglobin A1c goal is between 7% and 8%  Your Hemoglobin A1c is at goal  Plan is:Continue medications at current doses  2. LDL (bad cholesterol) goal is less than 100  Your LDL is at goal  Plan is: Continue medications at current doses  3. Blood pressure goal is less than 140/90.  Your blood pressure is at goal.  Plan is: Continue medications at current doses    4. Other: Smoking cessation is recommended      Recheck in 6 months.      Your care team recommends that you complete a free, online smoking cessation program designed to provide you with personalized strategies to help you quit.      The online tool, called Novu, is an interactive wellness program that will walk you through the steps needed to quit smoking and put you on a path for healthy living.  Through a simple registration, [employer] customizes itself to your unique needs and provides information only you are interested in receiving.  If you are interested in starting your journey to quit smoking, register using this link:    [email or web address]                             Follow-ups after your visit        Who to contact     If you have questions or need follow up information about today's clinic visit or your schedule please contact [facility] directly at [phone].  Normal or non-critical lab and imaging results will be communicated to you by MyChart, letter or phone within 4 business days after the clinic has received the results. If you do not hear from us within 7 days, please contact the clinic  through [employer] or phone. If you have a critical or abnormal lab result, we will notify you by phone as soon as possible.  Submit refill requests through [employer] or call your pharmacy and they will forward the refill request to us. Please allow 3 business days for your refill to be completed.          Additional Information About Your Visit        Hubspanhart Information     [employer] gives you secure access to your electronic health record. If you see a primary care provider, you can also send messages to your care team and make appointments. If you have questions, please call your primary care clinic.  If you do not have a primary care provider, please call [phone] and they will assist you.        Care EveryWhere ID     This is your Care EveryWhere ID. This could be used by other organizations to access your [facility] medical records  [id number]        Your Vitals Were     Pulse Temperature BMI (Body Mass Index)             84 99.5  F (37.5  C) (Tympanic) 30.81 kg/m2          Blood Pressure from Last 3 Encounters:   09/14/17 110/60   03/02/17 138/84   02/26/17 189/87    Weight from Last 3 Encounters:   09/14/17 240 lb (108.9 kg)   03/02/17 239 lb (108.4 kg)   02/26/17 240 lb (108.9 kg)              Today, you had the following     No orders found for display         Today's Medication Changes          These changes are accurate as of: 9/14/17  4:25 PM.  If you have any questions, ask your nurse or doctor.               Start taking these medicines.        Dose/Directions    acyclovir 400 MG tablet   Commonly known as:  ZOVIRAX   Used for:  Recurrent herpes labialis   Started by:  GIOVANNA Boles MD        Dose:  400 mg   Take 1 tablet (400 mg) by mouth 5 times daily   Quantity:  30 tablet   Refills:  5            Where to get your medicines      These medications were sent to [facility] [place] [address]  [address], [facility] [address]     Phone:   [phone]     acyclovir 400 MG tablet    atorvastatin 40 MG tablet                Primary Care Provider Office Phone # Fax #    R Marquis Boles [phone] [address] [facility] [address]        Equal Access to Services     SHANE MONIE : Hadii andria ku ernestoo Soangelali, waaxda luqadaha, qaybta kaalmada adeegyada, waxfilemon idiin hayjocelynn adegomez diop laSharitatrae oviedo. So [facility] [phone].    ATENCIÓN: Si habla español, tiene a de la otrre disposición servicios gratuitos de asistencia lingüística. Llame al [phone].    We comply with applicable federal civil rights laws and [place] laws. We do not discriminate on the basis of race, color, national origin, age, disability sex, sexual orientation or gender identity.            Thank you!     Thank you for choosing [facility]  for your care. Our goal is always to provide you with excellent care. Hearing back from our patients is one way we can continue to improve our services. Please take a few minutes to complete the written survey that you may receive in the mail after your visit with us. Thank you!             Your Updated Medication List - Protect others around you: Learn how to safely use, store and throw away your medicines at www.disposemymeds.org.          This list is accurate as of: 9/14/17  4:25 PM.  Always use your most recent med list.                   Brand Name Dispense Instructions for use Diagnosis    acyclovir 400 MG tablet    ZOVIRAX    30 tablet    Take 1 tablet (400 mg) by mouth 5 times daily    Recurrent herpes labialis       aspirin 81 MG tablet     100 tablet    Take 1 tablet (81 mg) by mouth daily    Type 2 diabetes mellitus with diabetic polyneuropathy (H)       atorvastatin 40 MG tablet    LIPITOR    90 tablet    Take 1 tablet (40 mg) by mouth daily    Hyperlipidemia LDL goal <100       * blood glucose monitoring meter device kit     1 kit    Use to test blood sugars 2 times daily or as directed.    Type  2 diabetes, HbA1C goal < 8% (H)       * blood glucose monitoring meter device kit     1 kit    Use to test blood sugars 4 times daily or as directed.    Type 2 diabetes mellitus with diabetic polyneuropathy, unspecified long term insulin use status (H)       * blood glucose monitoring test strip    BL TEST STRIP PACK    1 Box    Free Style Freedom Lite Test Stips; test 2 times daily    Type 2 diabetes, HbA1C goal < 8% (H)       * blood glucose monitoring test strip    no brand specified    100 each    Use to test blood sugar 4 times daily or as directed.    Type 2 diabetes mellitus with diabetic polyneuropathy, unspecified long term insulin use status (H)       glimepiride 4 MG tablet    AMARYL    30 tablet    Take 2 mg by mouth every morning (before breakfast)    Type 2 diabetes, HbA1C goal < 8% (H)       lisinopril 20 MG tablet    PRINIVIL/ZESTRIL    90 tablet    Take 1 tablet (20 mg) by mouth daily    Type 2 diabetes mellitus with diabetic polyneuropathy, without long-term current use of insulin (H)       metFORMIN 500 MG tablet    GLUCOPHAGE    120 tablet    Take 2 tablets (1,000 mg) by mouth 2 times daily (with meals)    Type 2 diabetes, HbA1C goal < 8% (H)       order for DME     4 Units    Equipment being ordered: 2 pair of diabetic shoes and 3 sets of shoe inserts    Type 2 diabetes mellitus with diabetic polyneuropathy (H)       * Notice:  This list has 4 medication(s) that are the same as other medications prescribed for you. Read the directions carefully, and ask your doctor or other care provider to review them with you.

## 2017-09-14 NOTE — NURSING NOTE
"Chief Complaint   Patient presents with     Hypertension     Diabetes       Initial /60 (BP Location: Right arm, Patient Position: Chair, Cuff Size: Adult Large)  Pulse 84  Temp 99.5  F (37.5  C) (Tympanic)  Wt 240 lb (108.9 kg)  BMI 30.81 kg/m2 Estimated body mass index is 30.81 kg/(m^2) as calculated from the following:    Height as of 3/2/17: 6' 2\" (1.88 m).    Weight as of this encounter: 240 lb (108.9 kg).  Medication Reconciliation: complete        Sisi CHOW CMA    "

## 2017-09-14 NOTE — PATIENT INSTRUCTIONS
Diabetes Plan  1. Hemoglobin A1c goal is between 7% and 8%  Your Hemoglobin A1c is at goal  Plan is:Continue medications at current doses  2. LDL (bad cholesterol) goal is less than 100  Your LDL is at goal  Plan is: Continue medications at current doses  3. Blood pressure goal is less than 140/90.  Your blood pressure is at goal.  Plan is: Continue medications at current doses    4. Other: Smoking cessation is recommended      Recheck in 6 months.      Your care team recommends that you complete a free, online smoking cessation program designed to provide you with personalized strategies to help you quit.      The online tool, called Novu, is an interactive wellness program that will walk you through the steps needed to quit smoking and put you on a path for healthy living.  Through a simple registration, [employer] customizes itself to your unique needs and provides information only you are interested in receiving.  If you are interested in starting your journey to quit smoking, register using this link:    [email or web address]

## 2017-09-27 ENCOUNTER — DOCUMENTATION ONLY (OUTPATIENT)
Dept: ENDOCRINOLOGY | Facility: CLINIC | Age: 57
End: 2017-09-27

## 2017-09-27 NOTE — PROGRESS NOTES
GRADE Study Visit    Patient is here for 33 month [facility] study visit. Patient is doing well and continues on metformin 1000 mg twice daily, in addition to randomized treatment of glimepiride at a dose of 2 mg twice daily.  Patient is tolerating medications and has been having no side effects. Patient is testing blood sugars every morning with an average of 100 for the past 2 weeks. He has episodes of symptomatic hypoglycemia about twice per month, usually when he eats dinner late or overnight. He will sometimes have a bedtime snack to prevent hypoglycemia.     Lab Results:   A1c: 6.9%    Plan:   1. Diabetes. At target, but he is having symptomatic hypoglycemia and is eating to avoid hypoglycemia. Therefore, we decided to reduce glimepiride dose to 2 mg in the morning and 1 mg at supper. Continue metformin without change.   2. Follow up in 3 months, sooner with concerns.     Kristie Gallegos MD  [facility]  Department of Medicine  Division of Endocrinology and Diabetes

## 2017-10-03 LAB — HBA1C MFR BLD: 6.9 % (ref 0–5.7)

## 2017-10-09 DIAGNOSIS — E11.42 TYPE 2 DIABETES MELLITUS WITH DIABETIC POLYNEUROPATHY, WITHOUT LONG-TERM CURRENT USE OF INSULIN (H): Primary | ICD-10-CM

## 2017-10-11 RX ORDER — ASPIRIN 81 MG
TABLET, DELAYED RELEASE (ENTERIC COATED) ORAL
Qty: 100 TABLET | Refills: 3 | Status: SHIPPED | OUTPATIENT
Start: 2017-10-11 | End: 2018-11-26

## 2017-10-11 NOTE — TELEPHONE ENCOUNTER
Routing refill request to provider for review/approval because:  Drug not on the [facility] refill protocol     Routing to provider.    Cira BURNS Rn

## 2017-10-11 NOTE — TELEPHONE ENCOUNTER
aspirin 81 MG tablet      Last Written Prescription Date: 7/18/16  Last Quantity: 100, # refills: 3  Last Office Visit with [facility], [facility] or [facility] prescribing provider: 9/14/17       Creatinine   Date Value Ref Range Status   12/14/2016 0.84 mg/dL Final     Lab Results   Component Value Date    AST 25 01/06/2014     Lab Results   Component Value Date    ALT 62 12/13/2013     BP Readings from Last 3 Encounters:   09/14/17 110/60   03/02/17 138/84   02/26/17 189/87

## 2017-11-01 ENCOUNTER — MEDICAL CORRESPONDENCE (OUTPATIENT)
Dept: HEALTH INFORMATION MANAGEMENT | Facility: CLINIC | Age: 57
End: 2017-11-01

## 2017-12-20 ENCOUNTER — DOCUMENTATION ONLY (OUTPATIENT)
Dept: ENDOCRINOLOGY | Facility: CLINIC | Age: 57
End: 2017-12-20

## 2017-12-20 NOTE — PROGRESS NOTES
GRADE Study Visit    Patient is here for 36 month [facility] study visit. Patient is doing well and continues on metformin 1000 mg twice daily, in addition to randomized treatment of glimepiride at a dose of 2 mg in the morning and 1 mg in the evening. He is tolerating medications and has been having no side effects.He is testing blood sugars every morning with an average of 130 over the past 4 weeks. Symptomatic hypoglycemia occurs 1-2 times per month, typically on the weekends when a meal is late. Episodes have been easily recognized and corrected. Of note, weight is up 2.1 kg since his last study visit.     Lab Results:   Component      Latest Ref Rng & Units 12/20/2017   Cholesterol      mg/dL 134   Triglycerides      mg/dL 64   HDL Cholesterol      mg/dL 34   LDL Cholesterol Calculated      mg/dL 87   Creatinine      mg/dL 0.79   Hemoglobin A1C      % 7.2 (H)     Component      Latest Ref Rng & Units 12/20/2017   Albumin Urine mg/g Cr      MG/G Creatinine 53.2 (H)       Plan:   1. Diabetes. Fasting blood glucose values and hemoglobin A1c are above goal. Recommend that he increase Amaryl to 2 mg BID, and work on diet and weight loss.   2. Elevated urine microalbumin. He is already taking an AceI.   3. Follow up in 3 months, sooner with concerns.     Kristie Gallegos MD  [facility]  Department of Medicine  Division of Endocrinology and Diabetes

## 2018-01-03 LAB
ALBUMIN URINE MG/G CR: 53.2 MG/G CREATININE
ALBUMIN URINE MG/SPEC: ABNORMAL
CHOLEST SERPL-MCNC: 134 MG/DL
CREAT SERPL-MCNC: 0.79 MG/DL
CREATININE URINE: ABNORMAL
GFR SERPL CREATININE-BSD FRML MDRD: NORMAL ML/MIN/1.73M2
HBA1C MFR BLD: 7.2 % (ref 0–5.7)
HDLC SERPL-MCNC: 34 MG/DL
LDLC SERPL CALC-MCNC: 87 MG/DL
NONHDLC SERPL-MCNC: NORMAL MG/DL
TRIGL SERPL-MCNC: 64 MG/DL

## 2018-01-16 NOTE — PATIENT INSTRUCTIONS
Gabapentin as prescribed    Schedule the MRI in [place]        Thank you for choosing [facility].  You may be receiving a survey in the mail from [employer] [facility] regarding your visit today.  Please take a few minutes to complete and return the survey to let us know how we are doing.      Our Clinic hours are:  Mondays    7:20 am - 7 pm  Tues -  Fri  7:20 am - 5 pm    Clinic Phone: [phone]    The clinic lab opens at 7:30 am Mon - Fri and appointments are required.    [facility] [place]  Ph. [phone]  Monday-Thursday 8 am - 7pm  Tues/Wed/Fri 8 am - 5:30 pm          Message   Recorded as Task   Date: 05/12/2016 02:46 PM, Created By: Sanford Tuttle   Task Name: Follow Up   Assigned To: [facility], [facility] Clinical,Team   Regarding Patient: Stevie Azul, Status: Active   CommentDelchristiana Lingo - 12 May 2016 2:46 PM     TASK CREATED  Anticoagulant hold request faxed to Dr Chaves to hold aspirin  Pt to be scheduled for a L2-L3 LESI  Received approval to hold aspirin 6 days prior to procedure  Lmom for pt to return call  Sanford Tuttle - 12 May 2016 2:47 PM     TASK EDITED   Sanford Tuttle - 12 May 2016 3:15 PM     TASK EDITED  *Needs to be scanned into allscripts   Eli Andrews - 12 May 2016 4:16 PM     TASK EDITED  Pt and  arrived at office  Offered the pt an appt for 5/20/16 for injection   asked if their was a doctor he could see sooner  Explained that the pt needs to hold his aspirin for 6 days prior and 5/20 is the soonest   appears to be upset and requested a number of a person to call that is higher up in [facility]ine [facility] Pain  [facility]# given along with our Clinical coordinator's name Dorian Mondragon  Pt kept interrupting his compainion saying its water under the bridge to let it go  Discussed pre procedure instructions with the pt as follows: NPO one hour prior to procedure,bring a ,wear loose fitting clothes,if pt becomes ill or runs a fever or starts on ABT he is to call the office  Pt is to stop aspirin starting 5/14/16  Pt verbalized understanding  Uma Castro - 13 May 2016 8:08 AM     TASK REPLIED TO: Previously Assigned To [place]  Thanks  Please inform Ray about this patient/patient's   Eli Andrews - 13 May 2016 12:00 PM     TASK EDITED   Tip Montague - 16 May 2016 2:42 PM     TASK EDITED  It sounds like the issue is undercontrol and taken care of  Would you like me to contact pt for any reason?    Uma Castro - 17 May 2016 8:14 AM     TASK REPLIED TO: Previously Assigned To [address] Clinical,Team  Aware  Thanks          Signatures   Electronically signed by : Cristiano Hawk, ; May 17 2016  8:28AM EST                       (Author)

## 2018-03-14 ENCOUNTER — TELEPHONE (OUTPATIENT)
Dept: FAMILY MEDICINE | Facility: CLINIC | Age: 58
End: 2018-03-14

## 2018-03-14 ENCOUNTER — DOCUMENTATION ONLY (OUTPATIENT)
Dept: ENDOCRINOLOGY | Facility: CLINIC | Age: 58
End: 2018-03-14

## 2018-03-14 DIAGNOSIS — Z12.11 SPECIAL SCREENING FOR MALIGNANT NEOPLASMS, COLON: Primary | ICD-10-CM

## 2018-03-14 NOTE — PROGRESS NOTES
GRADE Study Visit    Patient is here for 39 month [facility] study visit. Patient is doing well and continues on metformin 1000 mg twice daily, in addition to randomized treatment of glimepiride at a dose of 2 mg twice daily. He is tolerating medications and has been having no side effects. Fasting blood sugars have ranged 100-140 over the past 3 months, with occasional higher values. Symptomatic hypoglycemia occurs about once a month, usually on the weekend when his schedule is altered. He is working on weight loss, and has lost 0.7 kg since his last study visit. He is snacking a lot less than in the past.     Lab Results:   ENDO DIABETES Latest Ref Rng & Units 3/14/2018   HEMOGLOBIN A1C % 7.3 (H)     Plan:   1. Diabetes. Fasting blood glucose values and hemoglobin A1c are modestly above goal. Recommended that he increase Amaryl to 2 mg in the morning and 3 mg with supper on weekdays. On the weekends he may continue to take 2 mg twice daily (to reduce the risk of hypoglycemia).   2. Elevated urine microalbumin. He is taking an AceI.   3. Follow up in 3 months, sooner with concerns.     Kristie Gallegos MD  [facility]  Department of Medicine  Division of Endocrinology and Diabetes

## 2018-03-14 NOTE — TELEPHONE ENCOUNTER
Panel Management Review      Patient has the following on his problem list:     Diabetes    ASA: Passed    Last A1C  Lab Results   Component Value Date    A1C 7.2 12/20/2017    A1C 6.9 09/27/2017    A1C 7.0 06/28/2017    A1C 6.7 03/22/2017    A1C 6.7 12/14/2016     A1C tested: MONITOR    Last LDL:    Lab Results   Component Value Date    CHOL 134 12/20/2017     Lab Results   Component Value Date    HDL 34 12/20/2017     Lab Results   Component Value Date    LDL 87 12/20/2017     Lab Results   Component Value Date    TRIG 64 12/20/2017     Lab Results   Component Value Date    CHOLHDLRATIO 3.5 06/24/2015     No results found for: NHDL    Is the patient on a Statin? YES             Is the patient on Aspirin? YES    Medications     HMG CoA Reductase Inhibitors    atorvastatin (LIPITOR) 40 MG tablet    Salicylates    ASPIRIN LOW DOSE 81 MG EC tablet          Last three blood pressure readings:  BP Readings from Last 3 Encounters:   09/14/17 110/60   03/02/17 138/84   02/26/17 189/87       Date of last diabetes office visit: 9/14/17     Tobacco History:     History   Smoking Status     Current Every Day Smoker     Packs/day: 0.50     Years: 30.00     Types: Cigarettes   Smokeless Tobacco     Former User           Composite cancer screening  Chart review shows that this patient is due/due soon for the following Fecal Colorectal (FIT)  Summary:    Patient is due/failing the following:   FIT    Action needed:   Patient needs referral/order: fit and labs    Type of outreach:    Phone, left message for patient to call back.     Questions for provider review:    None                                                                                                                                    Latesha Richardson MA       Chart routed to Care Team .

## 2018-03-22 LAB — HBA1C MFR BLD: 7.3 % (ref 0–5.7)

## 2018-05-12 PROCEDURE — 82274 ASSAY TEST FOR BLOOD FECAL: CPT | Performed by: FAMILY MEDICINE

## 2018-05-21 DIAGNOSIS — Z12.11 SPECIAL SCREENING FOR MALIGNANT NEOPLASMS, COLON: ICD-10-CM

## 2018-05-21 LAB — HEMOCCULT STL QL IA: NEGATIVE

## 2018-06-13 ENCOUNTER — DOCUMENTATION ONLY (OUTPATIENT)
Dept: ENDOCRINOLOGY | Facility: CLINIC | Age: 58
End: 2018-06-13

## 2018-06-13 LAB
ALBUMIN URINE MG/G CR: 86.78 MG/G CREATININE
ALBUMIN URINE MG/SPEC: 105
CREATININE URINE: 121
HBA1C MFR BLD: 7.4 % (ref 0–5.7)

## 2018-06-13 NOTE — PROGRESS NOTES
GRADE study visit    Patient is here for 42 month [facility] study visit. Patient is doing well and continues on Metformin 1000 mg bid in addition to randomized treatment of Amaryl 2 mg BID over weekends and 2 mg and 3 mg weekdays.  Patient is tolerating medications and has been having no side effects. Patient is testing blood sugars every morning with an average of  140 for the past 2 weeks.     He has been on a vacation last few weeks; weight is up by 2.3 kgs.   Weight today 114.3  /72    Has had hypoglycemia symptoms over weekends in the past which has resolved since. This was due to inconsistent food intake and activity level over the weekend. Has resolved since 2 MG Amaryl BID over the weekend.   Last low several weeks ago per report. I don't see any lows on his BG readings today over the last 2-3 weeks. He only checks fasting.     Lab results:   Lab Results   Component Value Date    A1C 7.4 06/13/2018    A1C 7.3 03/14/2018    A1C 7.2 12/20/2017    A1C 6.9 09/27/2017    A1C 7.0 06/28/2017       Plan:   1. Diabetes-  Will continue current regimen. Checking BG other times of the day will give us more info and provided additional strips. Advised to check other times of the day; including bedtime. Based on BG results will adjust Mx. Per protocol (A1C less than 7.5) will continue current management for now.      2. Follow up in 3 months, sooner with concerns.     Results for CASTILLO POSADAS (MRN [id number]) as of 6/18/2018 12:35   Ref. Range 6/13/2018 00:00   Albumin Urine mg/g Cr Latest Units: MG/G Creatinine 86.78   Albumin Urine mg/spec Unknown 105   Creatinine Urine Unknown 121     3. Noted microalbuminuria; currently on Lisinopril.      Jessica Reynolds  Endocrinology Fellow   [facility]  Department of Medicine  Division of Endocrinology and Diabetes

## 2018-06-26 ENCOUNTER — TELEPHONE (OUTPATIENT)
Dept: FAMILY MEDICINE | Facility: CLINIC | Age: 58
End: 2018-06-26

## 2018-06-26 DIAGNOSIS — E11.42 TYPE 2 DIABETES MELLITUS WITH DIABETIC POLYNEUROPATHY, WITHOUT LONG-TERM CURRENT USE OF INSULIN (H): Primary | ICD-10-CM

## 2018-06-26 NOTE — TELEPHONE ENCOUNTER
Reason for Call:  Medication or medication refill:    Do you use a [facility]?  Name of the pharmacy and phone number for the current request:  [facility] [phone]    Name of the medication requested: Pt recently got a new blood sugar testing meter and needs new test strips to go with meter.  No need to call patient back, unless there are questions or problems.      Other request:     Can we leave a detailed message on this number? YES    Phone number patient can be reached at: Cell number on file:    Telephone Information:   Mobile [phone]       Best Time: any    Call taken on 6/26/2018 at 12:39 PM by Angelica Lee

## 2018-07-29 ENCOUNTER — MYC MEDICAL ADVICE (OUTPATIENT)
Dept: FAMILY MEDICINE | Facility: CLINIC | Age: 58
End: 2018-07-29

## 2018-07-29 DIAGNOSIS — E11.42 TYPE 2 DIABETES MELLITUS WITH DIABETIC POLYNEUROPATHY, WITHOUT LONG-TERM CURRENT USE OF INSULIN (H): ICD-10-CM

## 2018-07-30 RX ORDER — LISINOPRIL 20 MG/1
20 TABLET ORAL DAILY
Qty: 90 TABLET | Refills: 3 | Status: SHIPPED | OUTPATIENT
Start: 2018-07-30 | End: 2018-10-05

## 2018-07-30 NOTE — TELEPHONE ENCOUNTER
Dr. Boles,    I have pended the Lisinopril for your approval.  Patient is diabetic and has not been seen in our office since 9/2017. Please advise. Sandy LOCKHART RN

## 2018-09-12 ENCOUNTER — DOCUMENTATION ONLY (OUTPATIENT)
Dept: ENDOCRINOLOGY | Facility: CLINIC | Age: 58
End: 2018-09-12

## 2018-09-12 NOTE — PROGRESS NOTES
GRADE Study Visit    Patient is here for 45 month [facility] study visit. He is feeling well. Current study medications are metformin 1000 mg twice daily and glimepiride 2 mg in the morning and 3 mg at night on weekdays, and 2 mg twice daily on weekends. Average fasting morning blood glucose was 142 over the past month. No recent symptomatic hypoglycemia.     Lab Results:   Component      Latest Ref Rng & Units 9/12/2018   Hemoglobin A1C      % 7.5 (H)       Plan:   1. Diabetes. Fasting blood glucose values and HbA1c are above goal. Increase glimepiride dose modestly. At his suggestion he is going to check BG 4 times daily this week, to help with decision about which dose to increase (morning, evening, or both).   2. Follow up in 3 months, sooner with concerns.     Kristie Gallegos MD  [facility]  Department of Medicine  Division of Endocrinology and Diabetes

## 2018-09-18 LAB — HBA1C MFR BLD: 7.5 % (ref 0–5.7)

## 2018-10-01 ENCOUNTER — MYC REFILL (OUTPATIENT)
Dept: FAMILY MEDICINE | Facility: CLINIC | Age: 58
End: 2018-10-01

## 2018-10-01 DIAGNOSIS — E78.5 HYPERLIPIDEMIA LDL GOAL <100: ICD-10-CM

## 2018-10-01 RX ORDER — ATORVASTATIN CALCIUM 40 MG/1
40 TABLET, FILM COATED ORAL DAILY
Qty: 90 TABLET | Refills: 0 | Status: SHIPPED | OUTPATIENT
Start: 2018-10-01 | End: 2018-10-05

## 2018-10-01 NOTE — TELEPHONE ENCOUNTER
Message from [employer] Appt:  Original authorizing provider: MD Chin Cunha would like a refill of the following medications:  atorvastatin (LIPITOR) 40 MG tablet [GIOVANNA Boles MD]    Preferred pharmacy: [facility] [place], [place] - [address]    Comment:  I will run out of this before my appt this Friday.

## 2018-10-05 ENCOUNTER — OFFICE VISIT (OUTPATIENT)
Dept: FAMILY MEDICINE | Facility: CLINIC | Age: 58
End: 2018-10-05
Payer: COMMERCIAL

## 2018-10-05 VITALS
HEIGHT: 74 IN | RESPIRATION RATE: 16 BRPM | OXYGEN SATURATION: 97 % | HEART RATE: 76 BPM | TEMPERATURE: 97.8 F | WEIGHT: 249.2 LBS | SYSTOLIC BLOOD PRESSURE: 126 MMHG | DIASTOLIC BLOOD PRESSURE: 66 MMHG | BODY MASS INDEX: 31.98 KG/M2

## 2018-10-05 DIAGNOSIS — E78.5 HYPERLIPIDEMIA LDL GOAL <100: ICD-10-CM

## 2018-10-05 DIAGNOSIS — K21.00 GASTROESOPHAGEAL REFLUX DISEASE WITH ESOPHAGITIS: ICD-10-CM

## 2018-10-05 DIAGNOSIS — F17.200 TOBACCO USE DISORDER: ICD-10-CM

## 2018-10-05 DIAGNOSIS — M65.949 TENOSYNOVITIS OF HAND: ICD-10-CM

## 2018-10-05 DIAGNOSIS — B00.1 RECURRENT HERPES LABIALIS: ICD-10-CM

## 2018-10-05 DIAGNOSIS — E11.42 TYPE 2 DIABETES MELLITUS WITH DIABETIC POLYNEUROPATHY, WITHOUT LONG-TERM CURRENT USE OF INSULIN (H): Primary | ICD-10-CM

## 2018-10-05 PROCEDURE — 99214 OFFICE O/P EST MOD 30 MIN: CPT | Performed by: FAMILY MEDICINE

## 2018-10-05 RX ORDER — ATORVASTATIN CALCIUM 40 MG/1
40 TABLET, FILM COATED ORAL DAILY
Qty: 90 TABLET | Refills: 3 | Status: SHIPPED | OUTPATIENT
Start: 2018-10-05 | End: 2019-12-26

## 2018-10-05 RX ORDER — ACYCLOVIR 400 MG/1
400 TABLET ORAL
Qty: 30 TABLET | Refills: 5 | Status: SHIPPED | OUTPATIENT
Start: 2018-10-05 | End: 2019-12-26

## 2018-10-05 RX ORDER — LISINOPRIL 20 MG/1
20 TABLET ORAL DAILY
Qty: 90 TABLET | Refills: 3 | Status: SHIPPED | OUTPATIENT
Start: 2018-10-05 | End: 2019-10-01

## 2018-10-05 NOTE — MR AVS SNAPSHOT
After Visit Summary   10/5/2018    Chin Jade    MRN: [id number]           Patient Information     Date Of Birth          1960        Visit Information        Provider Department      10/5/2018 3:20 PM Post, GIOVANNA Cho MD [facility][note 2]        Today's Diagnoses     Type 2 diabetes mellitus with diabetic polyneuropathy, without long-term current use of insulin (H)    -  1    Hyperlipidemia LDL goal <100        Recurrent herpes labialis        Gastroesophageal reflux disease with esophagitis        Tenosynovitis of hand        Tobacco use disorder           Follow-ups after your visit        Additional Services     ORTHO  REFERRAL       [facility] is referring you to the Orthopedic  Services at [facility].       The  Representative will assist you in the coordination of your Orthopedic and Musculoskeletal Care as prescribed by your physician.    The  Representative will call you within 1 business day to help schedule your appointment, or you may contact the  Representative at:    All areas ~ [phone]     Type of Referral : Surgical / Specialist  (Hand specialist)      Timeframe requested: Routine    Coverage of these services is subject to the terms and limitations of your health insurance plan.  Please call member services at your health plan with any benefit or coverage questions.      If X-rays, CT or MRI's have been performed, please contact the facility where they were done to arrange for , prior to your scheduled appointment.  Please bring this referral request to your appointment and present it to your specialist.                  Who to contact     If you have questions or need follow up information about today's clinic visit or your schedule please contact [facility] directly at [phone].  Normal or non-critical lab and imaging results will be  "communicated to you by [employer], letter or phone within 4 business days after the clinic has received the results. If you do not hear from us within 7 days, please contact the clinic through [employer] or phone. If you have a critical or abnormal lab result, we will notify you by phone as soon as possible.  Submit refill requests through [employer] or call your pharmacy and they will forward the refill request to us. Please allow 3 business days for your refill to be completed.          Additional Information About Your Visit        [employer] Information     [employer] gives you secure access to your electronic health record. If you see a primary care provider, you can also send messages to your care team and make appointments. If you have questions, please call your primary care clinic.  If you do not have a primary care provider, please call [phone] and they will assist you.        Care EveryWhere ID     This is your Care EveryWhere ID. This could be used by other organizations to access your [place] medical records  [id number]        Your Vitals Were     Pulse Temperature Respirations Height Pulse Oximetry BMI (Body Mass Index)    76 97.8  F (36.6  C) (Tympanic) 16 6' 2\" (1.88 m) 97% 32 kg/m2       Blood Pressure from Last 3 Encounters:   10/05/18 126/66   09/14/17 110/60   03/02/17 138/84    Weight from Last 3 Encounters:   10/05/18 249 lb 3.2 oz (113 kg)   09/14/17 240 lb (108.9 kg)   03/02/17 239 lb (108.4 kg)              We Performed the Following     ORTHO  REFERRAL          Today's Medication Changes          These changes are accurate as of 10/5/18  5:15 PM.  If you have any questions, ask your nurse or doctor.               Start taking these medicines.        Dose/Directions    ranitidine 300 MG tablet   Commonly known as:  ZANTAC   Used for:  Gastroesophageal reflux disease with esophagitis   Started by:  GIOVANNA Boles MD        Dose:  300 mg   Take 1 tablet (300 mg) by mouth At Bedtime "   Quantity:  90 tablet   Refills:  3            Where to get your medicines      These medications were sent to [facility] [place], [place] - [address]  [address] [facility] [address]     Phone:  [phone]     acyclovir 400 MG tablet    atorvastatin 40 MG tablet    lisinopril 20 MG tablet    ranitidine 300 MG tablet                Primary Care Provider Office Phone # Fax #    R Marquis Boles [phone] [address] [facility] [address]        Equal Access to Services     [facility]: Hadii aad ku hadasho Soomaali, waaxda luqadaha, qaybta kaalmada adeegyada, semaj sommer hayaan jordan stinson . So [facility] [phone].    ATENCIÓN: Si habla español, tiene a de la torre disposición servicios gratuitos de asistencia lingüística. [facility] [phone].    We comply with applicable federal civil rights laws and [place] laws. We do not discriminate on the basis of race, color, national origin, age, disability, sex, sexual orientation, or gender identity.            Thank you!     Thank you for choosing [facility]  for your care. Our goal is always to provide you with excellent care. Hearing back from our patients is one way we can continue to improve our services. Please take a few minutes to complete the written survey that you may receive in the mail after your visit with us. Thank you!             Your Updated Medication List - Protect others around you: Learn how to safely use, store and throw away your medicines at www.disposemymeds.org.          This list is accurate as of 10/5/18  5:15 PM.  Always use your most recent med list.                   Brand Name Dispense Instructions for use Diagnosis    acyclovir 400 MG tablet    ZOVIRAX    30 tablet    Take 1 tablet (400 mg) by mouth 5 times daily    Recurrent herpes labialis       ASPIRIN LOW DOSE 81 MG EC tablet   Generic drug:  aspirin     100 tablet    TAKE ONE  TABLET BY MOUTH EVERY DAY    Type 2 diabetes mellitus with diabetic polyneuropathy, without long-term current use of insulin (H)       atorvastatin 40 MG tablet    LIPITOR    90 tablet    Take 1 tablet (40 mg) by mouth daily    Hyperlipidemia LDL goal <100       * blood glucose monitoring meter device kit     1 kit    Use to test blood sugars 2 times daily or as directed.    Type 2 diabetes, HbA1C goal < 8% (H)       * blood glucose monitoring meter device kit     1 kit    Use to test blood sugars 4 times daily or as directed.    Type 2 diabetes mellitus with diabetic polyneuropathy, unspecified long term insulin use status       * blood glucose monitoring test strip    no brand specified    100 each    Use to test blood sugar 4 times daily or as directed.    Type 2 diabetes mellitus with diabetic polyneuropathy, unspecified long term insulin use status       * blood glucose monitoring test strip    no brand specified    360 strip    Use to test blood sugars 4 times daily or as directed    Type 2 diabetes mellitus with diabetic polyneuropathy, without long-term current use of insulin (H)       lisinopril 20 MG tablet    PRINIVIL/ZESTRIL    90 tablet    Take 1 tablet (20 mg) by mouth daily    Type 2 diabetes mellitus with diabetic polyneuropathy, without long-term current use of insulin (H)       order for [facility]     4 Units    Equipment being ordered: 2 pair of diabetic shoes and 3 sets of shoe inserts    Type 2 diabetes mellitus with diabetic polyneuropathy (H)       ranitidine 300 MG tablet    ZANTAC    90 tablet    Take 1 tablet (300 mg) by mouth At Bedtime    Gastroesophageal reflux disease with esophagitis       * STUDY MEDICATION      Metformin 1000 mg twice daily. Medication provided by [employer] study.        * STUDY MEDICATION      Glimepiride 2 mg in the morning and 1 mg with dinner. Medication provided by [employer] study.        * Notice:  This list has 6 medication(s) that are the same as other medications  prescribed for you. Read the directions carefully, and ask your doctor or other care provider to review them with you.

## 2018-10-05 NOTE — PROGRESS NOTES
SUBJECTIVE:   Chin Jade is a 58 year old male who presents to clinic today for the following health issues:      Hyperlipidemia Follow-Up      Rate your low fat/cholesterol diet?: good    Taking statin?  Yes, no muscle aches from statin    Other lipid medications/supplements?:  none    Hypertension Follow-up      Outpatient blood pressures are not being checked.    Low Salt Diet: no added salt      Amount of exercise or physical activity: very active everyday    Problems taking medications regularly: No    Medication side effects: none    Diet: regular (no restrictions)        PROBLEMS TO ADD ON...  Diabetes Follow-up    Patient is checking blood sugars: twice daily.    Blood sugar testing frequency justification: Involved in a study at the [facility]  Results are as follows:    All well controlledNone    Diabetic concerns: None     Symptoms of hypoglycemia (low blood sugar): none     Paresthesias (numbness or burning in feet) or sores: Yes numbness and burning in both feet which is been present several years, perhaps slightly worsening        BP Readings from Last 2 Encounters:   10/05/18 126/66   09/14/17 110/60     Hemoglobin A1C (%)   Date Value   09/12/2018 7.5 (H)   06/13/2018 7.4     LDL Cholesterol Calculated (mg/dL)   Date Value   12/20/2017 87   12/14/2016 81       Diabetes Management Resources    GERD: He has had some chronic soreness in the back of his throat and was seen by ENT and thought to have some reflux.  He has practiced the lifestyle changes of elevating the head of bed, avoiding spicy food etc. the symptoms persist and wondered if he could try some medication.    Pain in the right hand: There is some thickening and tenderness in the palmar aspect of his right hand, has been present for a number of months and getting worse    Problem list and histories reviewed & adjusted, as indicated.  Additional history: none        Reviewed and updated as needed this visit by clinical  "staff  Tobacco  Allergies       Reviewed and updated as needed this visit by Provider               ROS:  Constitutional, HEENT, cardiovascular, pulmonary, gi and gu systems are negative, except as otherwise noted.        OBJECTIVE:                                                    /66  Pulse 76  Temp 97.8  F (36.6  C) (Tympanic)  Resp 16  Ht 6' 2\" (1.88 m)  Wt 249 lb 3.2 oz (113 kg)  SpO2 97%  BMI 32 kg/m2    GENERAL: healthy, alert and no distress  EYES: Eyes grossly normal to inspection, extraocular movements - intact, and PERRL  NECK: no tenderness, no adenopathy, no asymmetry, no masses, no stiffness; thyroid- normal to palpation  RESP: lungs clear to auscultation - no rales, no rhonchi, no wheezes  CV: regular rates and rhythm, normal S1 S2, no S3 or S4 and no murmur, no click or rub -  MS: Thickening and tenderness of the flexor tendon to the ring finger right hand; no contraction  Diabetic foot exam: no trophic changes or ulcerative lesions and reduced sensation with filament testing to the ankle region         ASSESSMENT/PLAN:                                                    ASSESSMENT:  1. Type 2 diabetes mellitus with diabetic polyneuropathy, without long-term current use of insulin (H)    2. Hyperlipidemia LDL goal <100    3. Recurrent herpes labialis    4. Gastroesophageal reflux disease with esophagitis    5. Tenosynovitis of hand    6. Tobacco use disorder        PLAN:  Orders Placed This Encounter     ORTHO  REFERRAL for a hand specialist to make recommendations for the tenosynovitis     atorvastatin (LIPITOR) 40 MG tablet     lisinopril (PRINIVIL/ZESTRIL) 20 MG tablet     acyclovir (ZOVIRAX) 400 MG tablet     ranitidine (ZANTAC) 300 MG tablet       We will try the Zantac along with the lifestyle changes to try to help with his reflux and sore throat.  Diabetic check in 6 months (he continues to follow with the study at the [facility] his diabetes also)    R " Marquis Post  [facility]

## 2018-10-11 ENCOUNTER — TRANSFERRED RECORDS (OUTPATIENT)
Dept: HEALTH INFORMATION MANAGEMENT | Facility: CLINIC | Age: 58
End: 2018-10-11

## 2018-11-26 DIAGNOSIS — E11.42 TYPE 2 DIABETES MELLITUS WITH DIABETIC POLYNEUROPATHY, WITHOUT LONG-TERM CURRENT USE OF INSULIN (H): ICD-10-CM

## 2018-11-26 NOTE — TELEPHONE ENCOUNTER
"Requested Prescriptions   Pending Prescriptions Disp Refills     aspirin (ASPIRIN LOW DOSE) 81 MG EC tablet  Last Written Prescription Date:  10/11/2017  Last Fill Quantity: 100,  # refills: 3   Last office visit: 10/5/2018 with prescribing provider:  post   Future Office Visit:     100 tablet 3     Sig: Take 1 tablet (81 mg) by mouth daily    Analgesics (Non-Narcotic Tylenol and ASA Only) Passed    11/26/2018  1:24 PM       Passed - Recent (12 mo) or future (30 days) visit within the authorizing provider's specialty    Patient had office visit in the last 12 months or has a visit in the next 30 days with authorizing provider or within the authorizing provider's specialty.  See \"Patient Info\" tab in inbasket, or \"Choose Columns\" in Meds & Orders section of the refill encounter.             Passed - Patient is age 20 years or older    If ASA is flagged for ages under 20 years old. Forward to provider for confirmation Ryes Syndrome is not a concern.                "

## 2018-11-26 NOTE — TELEPHONE ENCOUNTER
Prescription approved per [facility] Refill Protocol.  Adelina STREETER RN

## 2018-12-19 ENCOUNTER — TRANSFERRED RECORDS (OUTPATIENT)
Dept: HEALTH INFORMATION MANAGEMENT | Facility: CLINIC | Age: 58
End: 2018-12-19

## 2018-12-19 ENCOUNTER — DOCUMENTATION ONLY (OUTPATIENT)
Dept: ENDOCRINOLOGY | Facility: CLINIC | Age: 58
End: 2018-12-19

## 2018-12-19 NOTE — PROGRESS NOTES
GRADE Study Visit    Patient is here for 48 month [facility] study visit. Current study medications are metformin 1000 mg twice daily and glimepiride 2 mg morning/4 mg evening on weekdays and 2 mg morning/2 mg evening on weekends. He feels this is working very well. Average fasting morning blood glucose was 127 over the past 3 months. There was one episode of symptomatic hypoglycemia on a weekend when he skipped a meal; not other episodes since his last visit. Symptoms were easily recognized and corrected.     /71 and 135/68    Lab Results:   Component      Latest Ref Rng & Units 12/19/2018   Cholesterol      mg/dL 115   Triglycerides      mg/dL 74   HDL Cholesterol      mg/dL 32   LDL Cholesterol Calculated      mg/dL 68   Albumin Urine mg/spec      mg/L 93   Albumin Urine mg/g Cr      <30 MG/G Creatinine 56.36 (H)   Creatinine Urine      mg/dL 165   Creatinine      mg/dL 1.02   GFR Estimate      ml/min/1.73m2 82   Hemoglobin A1C      0 - 5.6 % 7.1 (A)   Vitamin B12      pg/mL 645       Plan:   1. Diabetes, which is well controlled. Continue current.   2. Elevated urine microalbumin. He is treated with an AceI. If blood pressure remains elevated he may benefit from an increase in dose. Management per his PCP.   3. Follow up in 3 months, sooner with concerns.     Kristie Gallegos MD  [facility]  Department of Medicine  Division of Endocrinology and Diabetes

## 2018-12-27 ENCOUNTER — MYC MEDICAL ADVICE (OUTPATIENT)
Dept: FAMILY MEDICINE | Facility: CLINIC | Age: 58
End: 2018-12-27

## 2018-12-27 DIAGNOSIS — E11.42 TYPE 2 DIABETES MELLITUS WITH DIABETIC POLYNEUROPATHY (H): ICD-10-CM

## 2018-12-27 DIAGNOSIS — E11.42 TYPE 2 DIABETES MELLITUS WITH DIABETIC POLYNEUROPATHY, WITHOUT LONG-TERM CURRENT USE OF INSULIN (H): Primary | ICD-10-CM

## 2018-12-27 NOTE — TELEPHONE ENCOUNTER
Please see my chart message  LOV 10/5/18  Cued orders for your review    Routing to provider.    Cira BURNS Rn

## 2018-12-27 NOTE — TELEPHONE ENCOUNTER
Done, and signed by Dr Boles. Sharri will fax them to [facility] for him,   Advised via Joob[employer].   Nafisa Sebastian RNC

## 2018-12-28 LAB
ALBUMIN URINE MG/G CR: 56.36 MG/G CREATININE
ALBUMIN URINE MG/SPEC: 93 MG/L
CHOLEST SERPL-MCNC: 115 MG/DL
CREAT SERPL-MCNC: 1.02 MG/DL
CREATININE URINE: 165 MG/DL
GFR SERPL CREATININE-BSD FRML MDRD: 82 ML/MIN/1.73M2
HBA1C MFR BLD: 7.1 % (ref 0–5.6)
HDLC SERPL-MCNC: 32 MG/DL
LDLC SERPL CALC-MCNC: 68 MG/DL
NONHDLC SERPL-MCNC: NORMAL MG/DL
TRIGL SERPL-MCNC: 74 MG/DL
VIT B12 SERPL-MCNC: 645 PG/ML

## 2019-01-07 ENCOUNTER — TELEPHONE (OUTPATIENT)
Dept: FAMILY MEDICINE | Facility: CLINIC | Age: 59
End: 2019-01-07

## 2019-01-15 ENCOUNTER — ANESTHESIA (OUTPATIENT)
Dept: SURGERY | Facility: CLINIC | Age: 59
End: 2019-01-15

## 2019-01-15 ENCOUNTER — HOSPITAL ENCOUNTER (OUTPATIENT)
Facility: CLINIC | Age: 59
Discharge: HOME | End: 2019-01-15
Attending: ORTHOPAEDIC SURGERY | Admitting: ORTHOPAEDIC SURGERY
Payer: COMMERCIAL

## 2019-01-15 ENCOUNTER — ANESTHESIA EVENT (OUTPATIENT)
Dept: SURGERY | Facility: CLINIC | Age: 59
End: 2019-01-15

## 2019-01-15 VITALS
SYSTOLIC BLOOD PRESSURE: 139 MMHG | HEART RATE: 80 BPM | BODY MASS INDEX: 31.95 KG/M2 | RESPIRATION RATE: 16 BRPM | TEMPERATURE: 97.5 F | HEIGHT: 74 IN | WEIGHT: 249 LBS | DIASTOLIC BLOOD PRESSURE: 72 MMHG | OXYGEN SATURATION: 96 %

## 2019-01-15 DIAGNOSIS — M65.331 TRIGGER MIDDLE FINGER OF RIGHT HAND: Primary | ICD-10-CM

## 2019-01-15 LAB — GLUCOSE BLDC GLUCOMTR-MCNC: 282 MG/DL (ref 70–99)

## 2019-01-15 PROCEDURE — 71000027 ZZH RECOVERY PHASE 2 EACH 15 MINS: Performed by: ORTHOPAEDIC SURGERY

## 2019-01-15 PROCEDURE — 25000125 ZZHC RX 250: Performed by: ORTHOPAEDIC SURGERY

## 2019-01-15 PROCEDURE — 40000305 ZZH STATISTIC PRE PROC ASSESS I: Performed by: ORTHOPAEDIC SURGERY

## 2019-01-15 PROCEDURE — 27210794 ZZH OR GENERAL SUPPLY STERILE: Performed by: ORTHOPAEDIC SURGERY

## 2019-01-15 PROCEDURE — 25000128 H RX IP 250 OP 636: Performed by: ORTHOPAEDIC SURGERY

## 2019-01-15 PROCEDURE — 82962 GLUCOSE BLOOD TEST: CPT

## 2019-01-15 PROCEDURE — 36000050 ZZH SURGERY LEVEL 2 1ST 30 MIN: Performed by: ORTHOPAEDIC SURGERY

## 2019-01-15 PROCEDURE — 25000132 ZZH RX MED GY IP 250 OP 250 PS 637: Performed by: PHYSICIAN ASSISTANT

## 2019-01-15 RX ORDER — ACETAMINOPHEN 325 MG/1
975 TABLET ORAL ONCE
Status: COMPLETED | OUTPATIENT
Start: 2019-01-15 | End: 2019-01-15

## 2019-01-15 RX ORDER — ACETAMINOPHEN 325 MG/1
650 TABLET ORAL
Status: DISCONTINUED | OUTPATIENT
Start: 2019-01-15 | End: 2019-01-15 | Stop reason: HOSPADM

## 2019-01-15 RX ORDER — CEPHALEXIN 500 MG/1
1000 CAPSULE ORAL
Status: COMPLETED | OUTPATIENT
Start: 2019-01-15 | End: 2019-01-15

## 2019-01-15 RX ORDER — LIDOCAINE HYDROCHLORIDE AND EPINEPHRINE 10; 10 MG/ML; UG/ML
INJECTION, SOLUTION INFILTRATION; PERINEURAL PRN
Status: DISCONTINUED | OUTPATIENT
Start: 2019-01-15 | End: 2019-01-15 | Stop reason: HOSPADM

## 2019-01-15 RX ORDER — HYDROCODONE BITARTRATE AND ACETAMINOPHEN 5; 325 MG/1; MG/1
1-2 TABLET ORAL EVERY 4 HOURS PRN
Qty: 8 TABLET | Refills: 0
Start: 2019-01-15 | End: 2020-03-25

## 2019-01-15 RX ADMIN — ACETAMINOPHEN 975 MG: 325 TABLET, FILM COATED ORAL at 11:23

## 2019-01-15 RX ADMIN — CEPHALEXIN 1000 MG: 500 CAPSULE ORAL at 11:23

## 2019-01-15 ASSESSMENT — MIFFLIN-ST. JEOR: SCORE: 2024.21

## 2019-01-15 NOTE — DISCHARGE INSTRUCTIONS
Same Day Surgery Discharge Instructions  Special Precautions After Surgery - Adult    1. It is not unusual to feel lightheaded or faint, up to 24 hours after surgery or while taking pain medication.  If you have these symptoms; sit for a few minutes before standing and have someone assist you when getting up.  2. You should rest and relax for the next 24 hours and must have someone stay with you for at least 24 hours after your discharge.  3. DO NOT DRIVE any vehicle or operate mechanical equipment for 24 hours following the end of your surgery.  DO NOT DRIVE while taking narcotic pain medications that have been prescribed by your physician.  If you had a limb operated on, you must be able to use it fully to drive.  4. DO NOT drink alcoholic beverages for 24 hours following surgery or while taking prescription pain medication.  5. Drink clear liquids (apple juice, ginger ale, broth, 7-Up, etc.).  Progress to your regular diet as you feel able.  6. Any questions call your physician and do not make important decisions for 24 hours.    ACTIVITY  ? No lifting more than 5 pounds for 2 weeks.     INCISIONAL CARE  ? Keep extremity elevated above the level of the heart if possible.  Check color and feeling of right fingers..  ? Apply ice 1/2 hour on and 1/2 hour off while awake.  ? Be alert for signs of infection:  redness, swelling, heat, drainage of pus, and/or elevated temperature.  Contact your doctor if these occur.     ? Dressing care as written per MD.     Call for an appointment to return to the clinic   As scheduled 1/29/19 at 10:30    Medications:  ? Hydrocodone (Norco, Vicodin):  Next dose: as needed for pain.  ? Follow the instructions on the bottle.     Additional discharge instructions: as written per MD.  __________________________________________________________________________________________________________________________________  IMPORTANT NUMBERS:    [facility] [place]:  [phone],  [phone]  Pharmacy:  [phone]  Same Day Surgery:  [phone], Monday - Friday until 8:30 p.m.  Urgent Care:  [phone]  Emergency Room:  [phone]      [facility]:  [phone]                                                                             [facility]:  [phone] option 1  [facility]:  [phone]     [facility] Clinic:  [phone]   [facility]gery Specialty Clinic:  [phone]   Home Medical Equipment: [phone]  [facility]:  [phone]

## 2019-01-15 NOTE — OP NOTE
[facility]   Operative Note    SURGEON:  Harshal Barcenas M.D.    ASSISTANT:  Shweta Mejía PA-C    PREOPERATIVE DIAGNOSIS  Right long trigger finger.    POSTOPERATIVE DIAGNOSIS  Right long trigger finger.    OPERATION  Right long trigger finger release.    ANESTHESIA  Local anesthesia    SPECIMENS  None.    DRAINS  None.    COMPLICATIONS  None.    ESTIMATED BLOOD LOSS  * No values recorded between 1/15/2019 12:00 AM and 1/15/2019 12:52 PM *    PROCEDURE     After discussing the risks, benefits, and alternatives to the procedure with the patient, the patient consented to proceed with the surgery as described below.  The patient understands the potential for neurovascular injury, infection, wound healing problems, stiffness, and persistence of symptoms.  The patient consented to proceed.    The patient was brought to the operating room and was placed on the operating table in a supine position. The right upper extremity was prepped with Hibiclens and was draped in the usual sterile manner.   Under sterile conditions, 1% lidocaine with 1:100,000 epinephrine was injected into the intended operative site over the volar base of the right middle finger.    A 1-cm transverseincision was made at the level of the A-1 pulley on the volar aspect of the  right middle finger.  The subcutaneous tissue was bluntly dissected and the radial and ulnar digital neurovascular structures were retracted.  The A1 pulley was identified.  It was carefully incised taking care to protect the underlying tendons and the A2 pulley.  The A1 pulley was thickened.  The superficialis tendon was inflamed with excessive synovial tissue in appearance. Active digital motion resulted in no residual triggering.  The incision was thoroughly irrigated and reapproximated with 4-0 nylon suture.  A light compressive dressing was applied.  The patient was taken to the recovery room in stable condition.

## 2019-03-11 DIAGNOSIS — E11.42 TYPE 2 DIABETES MELLITUS WITH DIABETIC POLYNEUROPATHY, WITHOUT LONG-TERM CURRENT USE OF INSULIN (H): ICD-10-CM

## 2019-03-12 NOTE — TELEPHONE ENCOUNTER
LM to call clinic/RN to establish care with new provider.  Last seen 10/5/18 with Dr.Post. Espino

## 2019-03-20 ENCOUNTER — DOCUMENTATION ONLY (OUTPATIENT)
Dept: ENDOCRINOLOGY | Facility: CLINIC | Age: 59
End: 2019-03-20

## 2019-03-20 LAB — HBA1C MFR BLD: 7.3 % (ref 0–5.6)

## 2019-03-20 NOTE — PROGRESS NOTES
GRADE Study Visit    Patient is here for 51 month [facility] study visit. Current study medications are metformin 1000 mg twice daily and glimepiride 2 mg morning/4 mg evening on weekdays and 2 mg morning/2 mg evening on weekends. He feels this is working very well because his schedule is not very regular on the weekend. Average fasting morning blood glucose was 140s over the past month. Rare hypoglycemia.    Physically active at work. Has had neuropathy with numbness symptoms. Checked his feet daily. Does not eat enough green vegetables but will work on it.      Lab Results:   A1c 7.3%    Plan:   1. Diabetes, which is well controlled. Continue current regimen  2. Encourage to add more green vegetables into his meal  2. Discussed daily foot checked  3. Follow up in 3 months, sooner with concerns.     Cherie De Souza MD  Division of Diabetes and Endocrinology  Department of Medicine

## 2019-04-15 ENCOUNTER — OFFICE VISIT (OUTPATIENT)
Dept: FAMILY MEDICINE | Facility: CLINIC | Age: 59
End: 2019-04-15
Payer: COMMERCIAL

## 2019-04-15 VITALS
BODY MASS INDEX: 33.13 KG/M2 | HEIGHT: 73 IN | TEMPERATURE: 98 F | WEIGHT: 250 LBS | SYSTOLIC BLOOD PRESSURE: 138 MMHG | DIASTOLIC BLOOD PRESSURE: 72 MMHG | RESPIRATION RATE: 16 BRPM | HEART RATE: 80 BPM

## 2019-04-15 DIAGNOSIS — E11.42 TYPE 2 DIABETES MELLITUS WITH DIABETIC POLYNEUROPATHY, WITHOUT LONG-TERM CURRENT USE OF INSULIN (H): ICD-10-CM

## 2019-04-15 DIAGNOSIS — Z87.891 PERSONAL HISTORY OF TOBACCO USE: Primary | ICD-10-CM

## 2019-04-15 LAB — TSH SERPL DL<=0.005 MIU/L-ACNC: 0.96 MU/L (ref 0.4–4)

## 2019-04-15 PROCEDURE — 99213 OFFICE O/P EST LOW 20 MIN: CPT | Performed by: FAMILY MEDICINE

## 2019-04-15 PROCEDURE — 84443 ASSAY THYROID STIM HORMONE: CPT | Performed by: FAMILY MEDICINE

## 2019-04-15 PROCEDURE — 36415 COLL VENOUS BLD VENIPUNCTURE: CPT | Performed by: FAMILY MEDICINE

## 2019-04-15 PROCEDURE — G0296 VISIT TO DETERM LDCT ELIG: HCPCS | Performed by: FAMILY MEDICINE

## 2019-04-15 ASSESSMENT — MIFFLIN-ST. JEOR: SCORE: 2012.87

## 2019-04-15 ASSESSMENT — PAIN SCALES - GENERAL: PAINLEVEL: NO PAIN (0)

## 2019-04-15 NOTE — PROGRESS NOTES
SUBJECTIVE:   Chin Jade is a 59 year old male who presents to clinic today for the following health issues:      HPI  Chief Complaint   Patient presents with     Establish Care     needs refill     In a study group at the  of  ([facility]).  Gets all diabetes medications from them, they manage his diabetes, he gets his metformin/amaryl/meter and test strips, etc.     He previously saw Dr. Boles who would do his other general medical care and reorder his diabetic shoes and orthotics, etc.     He is overdue for thyroid screening.  He declined Hep C screening  He has a >40 pack year history of smoking and CT scan to r/o cancer is recommended.  We discussed this at length. Risks, benefits and alternatives discussed.         Lung Cancer Screening Shared Decision Making Visit     Chin Jade is eligible for lung cancer screening on the basis of the information provided in my signed lung cancer screening order.     I have discussed with patient the risks and benefits of screening for lung cancer with low-dose CT.     The risks include:  radiation exposure: one low dose chest CT has as much ionizing radiation as about 15 chest x-rays or 6 months of background radiation living in [place]    false positives: 96% of positive findings/nodules are NOT cancer, but some might still require additional diagnostic evaluation, including biopsy  over-diagnosis: some slow growing cancers that might never have been clinically significant will be detected and treated unnecessarily     The benefit of early detection of lung cancer is contingent upon adherence to annual screening or more frequent follow up if indicated.     Furthermore, reaping the benefits of screening requires Chin Jade to be willing and physically able to undergo diagnostic procedures, if indicated. Although no specific guide is available for determining severity of comorbidities, it is reasonable to withhold screening in patients who have greater  "mortality risk from other diseases.     We did discuss that the only way to prevent lung cancer is to not smoke. Smoking cessation assistance was offered.    I did offer risk estimation using a calculator such as this one:    ShouldIScreen    /72 (Cuff Size: Adult Large)   Pulse 80   Temp 98  F (36.7  C) (Tympanic)   Resp 16   Ht 1.854 m (6' 1\")   Wt 113.4 kg (250 lb)   BMI 32.98 kg/m    EXAM: GENERAL APPEARANCE: Alert, no acute distress  RESP: lungs clear to auscultation   CV: normal rate, regular rhythm, no murmur or gallop  ABDOMEN: soft, no organomegaly, masses or tenderness  MS: extremities normal, no peripheral edema    ASSESSMENT/PLAN:      ICD-10-CM    1. Personal history of tobacco use Z87.891 Prof fee: Shared Decisionmaking for Lung Cancer Screening     CT Chest Lung Cancer Scrn Low Dose wo     Okay for Smoking Cessation Study (PLUTO) to Contact Patient   2. Type 2 diabetes mellitus with diabetic polyneuropathy, without long-term current use of insulin (H) E11.42 aspirin (ASPIRIN LOW DOSE) 81 MG EC tablet     TSH with free T4 reflex     F/u in 6 months for physical exam, foot exam, re-ordering his diabetic shoes and orthotics.  Foot exam at that time.    Continue care for diabetes through the [facility]ADE.      Leia Isabel M.D.    Patient Instructions       Our Clinic hours are:  Mondays    7:20 am - 7 pm  Tues -  Fri  7:20 am - 5 pm    Clinic Phone: [phone]    The clinic lab opens at 7:30 am Mon - Fri and appointments are required.    [facility]. [phone]  Monday  8 am - 7pm  Tues - Fri 8 am - 5:30 pm         Lung Cancer Screening   Frequently Asked Questions  If you are at high-risk for lung cancer, getting screened with low-dose computed tomography (LDCT) every year can help save your life. This handout offers answers to some of the most common questions about lung cancer screening. If you have other questions, please call [phone] ([phone]). "     What is it?  Lung cancer screening uses special X-ray technology to create an image of your lung tissue. The exam is quick and easy and takes less than 10 seconds. We don t give you any medicine or use any needles. You can eat before and after the exam. You don t need to change your clothes as long as the clothing on your chest doesn t contain metal. But, you do need to be able to hold your breath for at least 6 seconds during the exam.    What is the goal of lung cancer screening?  The goal of lung cancer screening is to save lives. Many times, lung cancer is not found until a person starts having physical symptoms. Lung cancer screening can help detect lung cancer in the earliest stages when it may be easier to treat.    Who should be screened for lung cancer?  We suggest lung cancer screening for anyone who is at high-risk for lung cancer. You are in the high-risk group if you:      are between the ages of 55 and 79, and    have smoked at least 1 pack of cigarettes a day for 30 or more years, and    still smoke or have quit within the past 15 years.    However, if you have a new cough or shortness of breath, you should talk to your doctor before being screened.    Some national lung health advocacy groups also recommend screening for people ages 50 to 79 who have smoked an average of 1 pack of cigarettes a day for 20 years. They must also have at least 1 other risk factor for lung cancer, not including exposure to secondhand smoke. Other risk factors are having had cancer in the past, emphysema, pulmonary fibrosis, COPD, a family history of lung cancer, or exposure to certain materials such as arsenic, asbestos, beryllium, cadmium, chromium, diesel fumes, nickel, radon or silica. Your care team can help you know if you have one of these risk factors.     Why does it matter if I have symptoms?  Certain symptoms can be a sign that you have a condition in your lungs that should be checked and treated by your  doctor. These symptoms include fever, chest pain, a new or changing cough, shortness of breath that you have never felt before, coughing up blood or unexplained weight loss. Having any of these symptoms can greatly affect the results of lung cancer screening.       Should all smokers get an LDCT lung cancer screening exam?  It depends. Lung cancer screening is for a very specific group of men and women who have a history of heavy smoking over a long period of time (see  Who should be screened for lung cancer  above).  I am in the high-risk group, but have been diagnosed with cancer in the past. Is LDCT lung cancer screening right for me?  In some cases, you should not have LDCT lung screening, such as when your doctor is already following your cancer with CT scan studies. Your doctor will help you decide if LDCT lung screening is right for you.  Do I need to have a screening exam every year?  Yes. If you are in the high-risk group described earlier, you should get an LDCT lung cancer screening exam every year until you are 79, or are no longer willing or able to undergo screening and possible procedures to diagnose and treat lung cancer.  How effective is LDCT at preventing death from lung cancer?  Studies have shown that LDCT lung cancer screening can lower the risk of death from lung cancer by 20 percent in people who are at high-risk.  What are the risks?  There are some risks and limitations of LDCT lung cancer screening. We want to make sure you understand the risks and benefits, so please let us know if you have any questions. Your doctor may want to talk with you more about these risks.    Radiation exposure: As with any exam that uses radiation, there is a very small increased risk of cancer. The amount of radiation in LDCT is small--about the same amount a person would get from a mammogram. Your doctor orders the exam when he or she feels the potential benefits outweigh the risks.    False negatives: No  test is perfect, including LDCT. It is possible that you may have a medical condition, including lung cancer, that is not found during your exam. This is called a false negative result.    False positives and more testing: LDCT very often finds something in the lung that could be cancer, but in fact is not. This is called a false positive result. False positive tests often cause anxiety. To make sure these findings are not cancer, you may need to have more tests. These tests will be done only if you give us permission. Sometimes patients need a treatment that can have side effects, such as a biopsy. For more information on false positives, see  What can I expect from the results?     Findings not related to lung cancer: Your LDCT exam also takes pictures of areas of your body next to your lungs. In a very small number of cases, the CT scan will show an abnormal finding in one of these areas, such as your kidneys, adrenal glands, liver or thyroid. This finding may not be serious, but you may need more tests. Your doctor can help you decide what other tests you may need, if any.  What can I expect from the results?  About 1 out of 4 LDCT exams will find something that may need more tests. Most of the time, these findings are lung nodules. Lung nodules are very small collections of tissue in the lung. These nodules are very common, and the vast majority--more than 97 percent--are not cancer (benign). Most are normal lymph nodes or small areas of scarring from past infections.  But, if a small lung nodule is found to be cancer, the cancer can be cured more than 90 percent of the time. To know if the nodule is cancer, we may need to get more images before your next yearly screening exam. If the nodule has suspicious features (for example, it is large, has an odd shape or grows over time), we will refer you to a specialist for further testing.  Will my doctor also get the results?  Yes. Your doctor will get a copy of your  results.  Is it okay to keep smoking now that there s a cancer screening exam?  No. Tobacco is one of the strongest cancer-causing agents. It causes not only lung cancer, but other cancers and cardiovascular (heart) diseases as well. The damage caused by smoking builds over time. This means that the longer you smoke, the higher your risk of disease. While it is never too late to quit, the sooner you quit, the better.  Where can I find help to quit smoking?  The best way to prevent lung cancer is to stop smoking. If you have already quit smoking, congratulations and keep it up! For help on quitting smoking, please call [employer] at 3-200-766-QIIX (4586) or the American Cancer Society at 1-942.716.2615 to find local resources near you.  One-on-one health coaching:  If you d prefer to work individually with a health care provider on tobacco cessation, we offer:      Medication Therapy Management:  Our specially trained pharmacists work closely with you and your doctor to help you quit smoking.  Call [phone] or [phone] (toll free).     Can Do: Health coaching offered by [facility].  [email or web address]

## 2019-04-15 NOTE — PATIENT INSTRUCTIONS
Our Clinic hours are:  Mondays    7:20 am - 7 pm  Tues - Fri  7:20 am - 5 pm    Clinic Phone: [phone]    The clinic lab opens at 7:30 am Mon - Fri and appointments are required.    [facility]. [phone]  Monday  8 am - 7pm  Tues - Fri 8 am - 5:30 pm         Lung Cancer Screening   Frequently Asked Questions  If you are at high-risk for lung cancer, getting screened with low-dose computed tomography (LDCT) every year can help save your life. This handout offers answers to some of the most common questions about lung cancer screening. If you have other questions, please call [phone] ([phone]).     What is it?  Lung cancer screening uses special X-ray technology to create an image of your lung tissue. The exam is quick and easy and takes less than 10 seconds. We don t give you any medicine or use any needles. You can eat before and after the exam. You don t need to change your clothes as long as the clothing on your chest doesn t contain metal. But, you do need to be able to hold your breath for at least 6 seconds during the exam.    What is the goal of lung cancer screening?  The goal of lung cancer screening is to save lives. Many times, lung cancer is not found until a person starts having physical symptoms. Lung cancer screening can help detect lung cancer in the earliest stages when it may be easier to treat.    Who should be screened for lung cancer?  We suggest lung cancer screening for anyone who is at high-risk for lung cancer. You are in the high-risk group if you:      are between the ages of 55 and 79, and    have smoked at least 1 pack of cigarettes a day for 30 or more years, and    still smoke or have quit within the past 15 years.    However, if you have a new cough or shortness of breath, you should talk to your doctor before being screened.    Some national lung health advocacy groups also recommend screening for people ages 50 to 79 who have  smoked an average of 1 pack of cigarettes a day for 20 years. They must also have at least 1 other risk factor for lung cancer, not including exposure to secondhand smoke. Other risk factors are having had cancer in the past, emphysema, pulmonary fibrosis, COPD, a family history of lung cancer, or exposure to certain materials such as arsenic, asbestos, beryllium, cadmium, chromium, diesel fumes, nickel, radon or silica. Your care team can help you know if you have one of these risk factors.     Why does it matter if I have symptoms?  Certain symptoms can be a sign that you have a condition in your lungs that should be checked and treated by your doctor. These symptoms include fever, chest pain, a new or changing cough, shortness of breath that you have never felt before, coughing up blood or unexplained weight loss. Having any of these symptoms can greatly affect the results of lung cancer screening.       Should all smokers get an LDCT lung cancer screening exam?  It depends. Lung cancer screening is for a very specific group of men and women who have a history of heavy smoking over a long period of time (see  Who should be screened for lung cancer  above).  I am in the high-risk group, but have been diagnosed with cancer in the past. Is LDCT lung cancer screening right for me?  In some cases, you should not have LDCT lung screening, such as when your doctor is already following your cancer with CT scan studies. Your doctor will help you decide if LDCT lung screening is right for you.  Do I need to have a screening exam every year?  Yes. If you are in the high-risk group described earlier, you should get an LDCT lung cancer screening exam every year until you are 79, or are no longer willing or able to undergo screening and possible procedures to diagnose and treat lung cancer.  How effective is LDCT at preventing death from lung cancer?  Studies have shown that LDCT lung cancer screening can lower the risk of  death from lung cancer by 20 percent in people who are at high-risk.  What are the risks?  There are some risks and limitations of LDCT lung cancer screening. We want to make sure you understand the risks and benefits, so please let us know if you have any questions. Your doctor may want to talk with you more about these risks.    Radiation exposure: As with any exam that uses radiation, there is a very small increased risk of cancer. The amount of radiation in LDCT is small--about the same amount a person would get from a mammogram. Your doctor orders the exam when he or she feels the potential benefits outweigh the risks.    False negatives: No test is perfect, including LDCT. It is possible that you may have a medical condition, including lung cancer, that is not found during your exam. This is called a false negative result.    False positives and more testing: LDCT very often finds something in the lung that could be cancer, but in fact is not. This is called a false positive result. False positive tests often cause anxiety. To make sure these findings are not cancer, you may need to have more tests. These tests will be done only if you give us permission. Sometimes patients need a treatment that can have side effects, such as a biopsy. For more information on false positives, see  What can I expect from the results?     Findings not related to lung cancer: Your LDCT exam also takes pictures of areas of your body next to your lungs. In a very small number of cases, the CT scan will show an abnormal finding in one of these areas, such as your kidneys, adrenal glands, liver or thyroid. This finding may not be serious, but you may need more tests. Your doctor can help you decide what other tests you may need, if any.  What can I expect from the results?  About 1 out of 4 LDCT exams will find something that may need more tests. Most of the time, these findings are lung nodules. Lung nodules are very small  collections of tissue in the lung. These nodules are very common, and the vast majority--more than 97 percent--are not cancer (benign). Most are normal lymph nodes or small areas of scarring from past infections.  But, if a small lung nodule is found to be cancer, the cancer can be cured more than 90 percent of the time. To know if the nodule is cancer, we may need to get more images before your next yearly screening exam. If the nodule has suspicious features (for example, it is large, has an odd shape or grows over time), we will refer you to a specialist for further testing.  Will my doctor also get the results?  Yes. Your doctor will get a copy of your results.  Is it okay to keep smoking now that there s a cancer screening exam?  No. Tobacco is one of the strongest cancer-causing agents. It causes not only lung cancer, but other cancers and cardiovascular (heart) diseases as well. The damage caused by smoking builds over time. This means that the longer you smoke, the higher your risk of disease. While it is never too late to quit, the sooner you quit, the better.  Where can I find help to quit smoking?  The best way to prevent lung cancer is to stop smoking. If you have already quit smoking, congratulations and keep it up! For help on quitting smoking, please call [employer] at 0-851-949-UVAR (2349) or the American Cancer Society at 1-431.490.4267 to find local resources near you.  One-on-one health coaching:  If you d prefer to work individually with a health care provider on tobacco cessation, we offer:      Medication Therapy Management:  Our specially trained pharmacists work closely with you and your doctor to help you quit smoking.  Call [phone] or [phone] (toll free).     Can Do: Health coaching offered by [facility].  [email or web address]

## 2019-04-17 NOTE — RESULT ENCOUNTER NOTE
Chin,    All of the labs were normal or acceptable.    Please contact my office if you have questions.    Leia Isabel M.D.

## 2019-06-12 ENCOUNTER — DOCUMENTATION ONLY (OUTPATIENT)
Dept: ENDOCRINOLOGY | Facility: CLINIC | Age: 59
End: 2019-06-12

## 2019-06-12 LAB
ALBUMIN URINE MG/G CR: 83.93 MG/G CREATININE
ALBUMIN URINE MG/SPEC: NORMAL
CREATININE URINE: 112
HBA1C MFR BLD: 7.1 % (ref 0–5.6)

## 2019-06-12 NOTE — PROGRESS NOTES
GRADE Study Visit    Patient is here for 54 month GRADE study visit. Current study medications are metformin 1000 mg twice daily and glimepiride 2 mg morning/4 mg evening on weekdays and 2 mg morning/2 mg evening on weekends. He feels this is working very well because his schedule is not very regular on the weekend. Average fasting morning blood glucose was 129s over the past month. Rare hypoglycemia.    He has tried to work on portion control and healthy diet. He is physically active at work. Has had neuropathy with numbness symptoms.     Lab Results:   Lab Results   Component Value Date    A1C 7.1 06/12/2019    A1C 7.3 03/20/2019    A1C 7.1 12/19/2018    A1C 7.5 09/12/2018    A1C 7.4 06/13/2018     Results for CASTILLO POSADAS (MRN [id number]) as of 6/24/2019 17:57   Ref. Range 6/12/2019 00:00   Albumin Urine mg/g Cr Latest Ref Range: <30 MG/G Creatinine 83.93     Plan:   1. Diabetes, which is well controlled. Continue current regimen  2. Encourage to add more green vegetables into his meal  3. Encourage him to continue with walking  4. Follow up in 3 months, sooner with concerns.     Cherie De Souza MD  Division of Diabetes and Endocrinology  Department of Medicine

## 2019-08-09 ENCOUNTER — TELEPHONE (OUTPATIENT)
Dept: FAMILY MEDICINE | Facility: CLINIC | Age: 59
End: 2019-08-09

## 2019-08-09 NOTE — TELEPHONE ENCOUNTER
Panel Management Review      Patient has the following on his problem list:     Diabetes    ASA: Passed    Last A1C  Lab Results   Component Value Date    A1C 7.1 06/12/2019    A1C 7.3 03/20/2019    A1C 7.1 12/19/2018    A1C 7.5 09/12/2018    A1C 7.4 06/13/2018     A1C tested: MONITOR    Last LDL:    Lab Results   Component Value Date    CHOL 115 12/19/2018     Lab Results   Component Value Date    HDL 32 12/19/2018     Lab Results   Component Value Date    LDL 68 12/19/2018     Lab Results   Component Value Date    TRIG 74 12/19/2018     Lab Results   Component Value Date    CHOLHDLRATIO 3.5 06/24/2015     No results found for: NHDL    Is the patient on a Statin? YES             Is the patient on Aspirin? YES    Medications     HMG CoA Reductase Inhibitors     atorvastatin (LIPITOR) 40 MG tablet       Salicylates     aspirin (ASPIRIN LOW DOSE) 81 MG EC tablet             Last three blood pressure readings:  BP Readings from Last 3 Encounters:   04/15/19 138/72   01/15/19 139/72   10/05/18 126/66       Date of last diabetes office visit: 4/15/19     Tobacco History:     History   Smoking Status     Current Every Day Smoker     Packs/day: 0.50     Years: 30.00     Types: Cigarettes   Smokeless Tobacco     Former User           Composite cancer screening  Chart review shows that this patient is due/due soon for the following Fecal Colorectal (FIT)  Summary:    Patient is due/failing the following:   FIT    Action needed:   Patient needs referral/order: Fit    Type of outreach:    Phone, left message for patient to call back.     Questions for provider review:    None                                                                                                                                    Latesha Richardson MA

## 2019-10-23 ENCOUNTER — DOCUMENTATION ONLY (OUTPATIENT)
Dept: ENDOCRINOLOGY | Facility: CLINIC | Age: 59
End: 2019-10-23

## 2019-10-23 LAB — HBA1C MFR BLD: 7.4 % (ref 0–5.6)

## 2019-10-23 NOTE — PROGRESS NOTES
GRADE Study Visit    Patient is here for 57 month GRADE study visit. Current study medications are metformin 1000 mg twice daily and glimepiride 2 mg morning/4 mg evening on weekdays and 2 mg morning/2 mg evening on weekends. He feels this is working very well because his schedule is not very regular on the weekend. Average fasting morning blood glucose was 131 (SD 22s over the past month. Rare hypoglycemia.    He has tried to work on portion control and healthy diet. He is physically active at work. Has had neuropathy with numbness symptoms.     Lab Results:   A1c 7.4    Plan:   1. Diabetes, which is well controlled. Continue current regimen  2. Encourage to add more green vegetables into his meal  3. Encourage him to continue with walking  4. Follow up in 3 months, sooner with concerns.     Cherie De Souza MD  Division of Diabetes and Endocrinology  Department of Medicine

## 2019-10-31 DIAGNOSIS — E11.42 TYPE 2 DIABETES MELLITUS WITH DIABETIC POLYNEUROPATHY (H): ICD-10-CM

## 2019-10-31 RX ORDER — BLOOD-GLUCOSE METER
EACH MISCELLANEOUS
Qty: 1 KIT | Refills: 0 | Status: SHIPPED | OUTPATIENT
Start: 2019-10-31

## 2019-10-31 NOTE — TELEPHONE ENCOUNTER
Mykel from the Pharmacy came over and states he spoke with the patient and he actually needs test strips and not a meter. He will call us to let us now which strips when he gets home and looks at this meter.    Radha Schmidt RN

## 2019-10-31 NOTE — TELEPHONE ENCOUNTER
Pt has called back and needs accu check test strips order today.  The meter he had now is Accu Check Guide,  He goes to  [facility].    Sandy Joyner  [facility]

## 2019-10-31 NOTE — TELEPHONE ENCOUNTER
"ACCU-CHEK GUIDE STRP      Requested Prescriptions   Pending Prescriptions Disp Refills     blood glucose monitoring (ACCU-CHEK KERMIT PLUS) meter device kit 1 kit 0     Sig: Use to test blood sugars 4 times daily or as directed.       Diabetic Supplies Protocol Failed - 10/31/2019 11:42 AM        Failed - Recent (6 mo) or future (30 days) visit within the authorizing provider's specialty     Patient had office visit in the last 6 months or has a visit in the next 30 days with authorizing provider.  See \"Patient Info\" tab in inbasket, or \"Choose Columns\" in Meds & Orders section of the refill encounter.            Passed - Medication is active on med list        Passed - Patient is 18 years of age or older        Last Written Prescription Date:  6/26/2018  Last Fill Quantity: 360,  # refills: 3   Last office visit: 4/15/2019 with prescribing provider:  Chalo   Future Office Visit:      "

## 2019-11-04 ENCOUNTER — HEALTH MAINTENANCE LETTER (OUTPATIENT)
Age: 59
End: 2019-11-04

## 2019-11-14 ENCOUNTER — TELEPHONE (OUTPATIENT)
Dept: FAMILY MEDICINE | Facility: CLINIC | Age: 59
End: 2019-11-14

## 2019-11-14 DIAGNOSIS — K21.00 GASTROESOPHAGEAL REFLUX DISEASE WITH ESOPHAGITIS: Primary | ICD-10-CM

## 2019-11-14 RX ORDER — FAMOTIDINE 40 MG/1
40 TABLET, FILM COATED ORAL DAILY
Qty: 90 TABLET | Refills: 1 | Status: SHIPPED | OUTPATIENT
Start: 2019-11-14 | End: 2020-07-27

## 2019-11-14 NOTE — TELEPHONE ENCOUNTER
Hello,   Ranitidine has been recalled, all of the OTC as well, Raul is suggesting Famotidine 40mg for patients to take in place of it. Please send a new prescription for this patient.     Thank you,     Sola Avalos   Certified Pharmacy Technician, [facility]   Ph: [phone]   Fx: [phone]    Routing comment        Adelina STREETER RN

## 2019-12-19 ENCOUNTER — TRANSFERRED RECORDS (OUTPATIENT)
Dept: HEALTH INFORMATION MANAGEMENT | Facility: CLINIC | Age: 59
End: 2019-12-19

## 2019-12-19 LAB — RETINOPATHY: NORMAL

## 2019-12-23 DIAGNOSIS — E78.5 HYPERLIPIDEMIA LDL GOAL <100: ICD-10-CM

## 2019-12-23 DIAGNOSIS — B00.1 RECURRENT HERPES LABIALIS: ICD-10-CM

## 2019-12-23 NOTE — TELEPHONE ENCOUNTER
Requested Prescriptions   Pending Prescriptions Disp Refills     acyclovir (ZOVIRAX) 400 MG tablet 30 tablet 5     Sig: Take 1 tablet (400 mg) by mouth 5 times daily       There is no refill protocol information for this order        Last Written Prescription Date:  10/5/18  Last Fill Quantity: 30,  # refills: 5   Last office visit: 4/15/2019 with prescribing provider:  Leia Isabel     Future Office Visit:

## 2019-12-23 NOTE — TELEPHONE ENCOUNTER
Requested Prescriptions   Pending Prescriptions Disp Refills     atorvastatin (LIPITOR) 40 MG tablet 90 tablet 3     Sig: Take 1 tablet (40 mg) by mouth daily       There is no refill protocol information for this order        Last Written Prescription Date:  10/5/18  Last Fill Quantity: 90,  # refills: 3   Last office visit: 4/15/2019 with prescribing provider:  Leia Isabel     Future Office Visit:

## 2019-12-26 RX ORDER — ATORVASTATIN CALCIUM 40 MG/1
40 TABLET, FILM COATED ORAL DAILY
Qty: 30 TABLET | Refills: 0 | Status: SHIPPED | OUTPATIENT
Start: 2019-12-26 | End: 2020-01-27

## 2019-12-26 RX ORDER — ACYCLOVIR 400 MG/1
400 TABLET ORAL
Qty: 30 TABLET | Refills: 0 | Status: SHIPPED | OUTPATIENT
Start: 2019-12-26 | End: 2023-03-06

## 2019-12-26 NOTE — TELEPHONE ENCOUNTER
"Requested Prescriptions   Pending Prescriptions Disp Refills     acyclovir (ZOVIRAX) 400 MG tablet 30 tablet 5     Sig: Take 1 tablet (400 mg) by mouth 5 times daily       Antivirals for Herpes Protocol Failed - 12/23/2019 10:29 AM        Failed - Normal serum creatinine on file in past 12 months     Recent Labs   Lab Test 12/19/18   CR 1.02             Passed - Patient is age 12 or older        Passed - Recent (12 mo) or future (30 days) visit within the authorizing provider's specialty     Patient has had an office visit with the authorizing provider or a provider within the authorizing providers department within the previous 12 mos or has a future within next 30 days. See \"Patient Info\" tab in inbasket, or \"Choose Columns\" in Meds & Orders section of the refill encounter.              Passed - Medication is active on med list        Routing refill request to provider for review/approval because:  LAst prescribed by Dr. Boles, since retired.           "

## 2019-12-26 NOTE — TELEPHONE ENCOUNTER
"Requested Prescriptions   Pending Prescriptions Disp Refills     atorvastatin (LIPITOR) 40 MG tablet 90 tablet 3     Sig: Take 1 tablet (40 mg) by mouth daily       Statins Protocol Failed - 12/23/2019 10:31 AM        Failed - LDL on file in past 12 months     Recent Labs   Lab Test 12/19/18   LDL 68             Passed - No abnormal creatine kinase in past 12 months     No lab results found.             Passed - Recent (12 mo) or future (30 days) visit within the authorizing provider's specialty     Patient has had an office visit with the authorizing provider or a provider within the authorizing providers department within the previous 12 mos or has a future within next 30 days. See \"Patient Info\" tab in inbasket, or \"Choose Columns\" in Meds & Orders section of the refill encounter.              Passed - Medication is active on med list        Passed - Patient is age 18 or older        Medication is being filled for 1 time refill only due to:  Patient needs labs lipids.    "

## 2020-01-16 ENCOUNTER — DOCUMENTATION ONLY (OUTPATIENT)
Dept: ENDOCRINOLOGY | Facility: CLINIC | Age: 60
End: 2020-01-16

## 2020-01-16 LAB
CHOLEST SERPL-MCNC: 139 MG/DL
CREAT SERPL-MCNC: 0.86 MG/DL
HBA1C MFR BLD: 8 % (ref 0–5.6)
HDLC SERPL-MCNC: 35 MG/DL
LDL CHOLESTEROL: 91 MG/DL
MICROALBUMIN - QUEST: 80.12
TRIGL SERPL-MCNC: 66 MG/DL

## 2020-01-16 NOTE — PROGRESS NOTES
GRADE Study Visit    Patient is here for 60 month GRADE study visit. Current study medications are metformin 1000 mg twice daily and glimepiride 2 mg morning/4 mg evening on weekdays and 2 mg morning/2 mg evening on weekends. He feels this is working very well because his schedule is not very regular on the weekend. Average fasting morning blood glucose was 136, but he feels like it has been slowly going up despite same physical activity and diet.  He has occasional hypoglycemia on the weekends.     He has tried to work on portion control and healthy diet. He is physically active at work. Has had neuropathy with numbness symptoms.     Last a1c: 7.4%    Lab Results:   A1c: 8.0%  Total cholesterol: 139  TG- 66  HDL- 35  LDL- 91  Creatinine- 0.86  Albumin:creatinine ratio: 80.12    Plan:   1. Diabetes- A1c has climbed. Discussed diet and activity and importance of making plants a majority of his diet. Encouraged him to continue with walking  2. Follow up in 3 months, sooner with concerns.     Rebeca Shields PA-C  Division of Diabetes and Endocrinology  Department of Medicine

## 2020-01-21 ENCOUNTER — TELEPHONE (OUTPATIENT)
Dept: SCHEDULING | Facility: CLINIC | Age: 60
End: 2020-01-21

## 2020-01-21 NOTE — TELEPHONE ENCOUNTER
1/21/2020    Call Regarding Preventive Health Screening Colonoscopy and LDL and ReattributionPhysical       Attempt 2    Message on voicemail    Comments:       Outreach   CONSTANTIN

## 2020-01-27 DIAGNOSIS — E78.5 HYPERLIPIDEMIA LDL GOAL <100: ICD-10-CM

## 2020-01-27 RX ORDER — ATORVASTATIN CALCIUM 40 MG/1
TABLET, FILM COATED ORAL
Qty: 30 TABLET | Refills: 0 | Status: SHIPPED | OUTPATIENT
Start: 2020-01-27 | End: 2020-02-25

## 2020-01-27 NOTE — TELEPHONE ENCOUNTER
"Requested Prescriptions   Pending Prescriptions Disp Refills     atorvastatin (LIPITOR) 40 MG tablet [Pharmacy Med Name: ATORVASTATIN CALCIUM 40MG TABS] 30 tablet 0     Sig: TAKE ONE TABLET BY MOUTH EVERY DAY       Statins Protocol Passed - 1/27/2020  5:02 AM        Passed - LDL on file in past 12 months     Recent Labs   Lab Test 12/19/18   LDL 68             Passed - No abnormal creatine kinase in past 12 months     No lab results found.             Passed - Recent (12 mo) or future (30 days) visit within the authorizing provider's specialty     Patient has had an office visit with the authorizing provider or a provider within the authorizing providers department within the previous 12 mos or has a future within next 30 days. See \"Patient Info\" tab in inbasket, or \"Choose Columns\" in Meds & Orders section of the refill encounter.              Passed - Medication is active on med list        Passed - Patient is age 18 or older      Last Written Prescription Date:  12/26/2019  Last Fill Quantity: 30,  # refills: 0   Last office visit: 4/15/2019 with prescribing provider:  Chalo   Future Office Visit:      "

## 2020-01-28 NOTE — TELEPHONE ENCOUNTER
1/28/2020    Call Regarding Preventive Health Screening Colonoscopy and LDL and ReattributionPhysical       Attempt 3    Message on voicemail    Comments:       Outreach   GB

## 2020-02-21 DIAGNOSIS — E78.5 HYPERLIPIDEMIA LDL GOAL <100: ICD-10-CM

## 2020-02-21 NOTE — TELEPHONE ENCOUNTER
"Requested Prescriptions   Pending Prescriptions Disp Refills     ATORVASTATIN 40 MG PO tablet [Pharmacy Med Name: ATORVASTATIN CALCIUM 40MG TABS]  Last Written Prescription Date:  1/27/20  Last Fill Quantity: 30,  # refills: 0   Last Office Visit with FMKIRAN, BRIAN or [facility] prescribing provider:  4/15/19   Future Office Visit:      30 tablet 0     Sig: TAKE ONE TABLET BY MOUTH EVERY DAY       Statins Protocol Passed - 2/21/2020  5:02 AM        Passed - LDL on file in past 12 months     Recent Labs   Lab Test 12/19/18   LDL 68             Passed - No abnormal creatine kinase in past 12 months     No lab results found.             Passed - Recent (12 mo) or future (30 days) visit within the authorizing provider's specialty     Patient has had an office visit with the authorizing provider or a provider within the authorizing providers department within the previous 12 mos or has a future within next 30 days. See \"Patient Info\" tab in inbasket, or \"Choose Columns\" in Meds & Orders section of the refill encounter.              Passed - Medication is active on med list        Passed - Patient is age 18 or older          "

## 2020-02-25 RX ORDER — ATORVASTATIN CALCIUM 40 MG/1
TABLET, FILM COATED ORAL
Qty: 30 TABLET | Refills: 0 | Status: SHIPPED | OUTPATIENT
Start: 2020-02-25 | End: 2020-03-23

## 2020-02-25 NOTE — TELEPHONE ENCOUNTER
Medication is being filled for 1 time refill only due to:  Patient needs to be seen because overdue for diabetic check.  .     Left message for patient to schedule appointment prior to next refill.    Shanice Wilson RN

## 2020-03-02 ENCOUNTER — DOCUMENTATION ONLY (OUTPATIENT)
Dept: ENDOCRINOLOGY | Facility: CLINIC | Age: 60
End: 2020-03-02

## 2020-03-02 LAB — HBA1C MFR BLD: 7.5 % (ref 0–5.6)

## 2020-03-02 NOTE — PROGRESS NOTES
GRADE study visit    Patient is here for 63 month GRADE study visit. Patient is doing well and continues on Metformin 1000 mg bid in addition to randomized treatment of amaryl.  Dose was increased to 3 mg in AM and 4 mg in PM at last visit because of A1c of 8.0.  AM BG were in the 160s then and have now dropped to 120-140.  Also had febrile illness before last visit that probably contributed to A1c rise.  Patient is tolerating medications and has no concerns today.         Plan:   1.  Diabetes- A1c pending today.  Continue current regimen for now with plan to alter as needed based on A1c.  Introduced idea that he may need lantus to control his AM sugars.  2.  Follow up in 3 months.     Angelica Gomes MD  [facility]  Department of Medicine  Division of Endocrinology and Diabetes

## 2020-03-21 DIAGNOSIS — E78.5 HYPERLIPIDEMIA LDL GOAL <100: ICD-10-CM

## 2020-03-23 RX ORDER — ATORVASTATIN CALCIUM 40 MG/1
TABLET, FILM COATED ORAL
Qty: 30 TABLET | Refills: 2 | Status: SHIPPED | OUTPATIENT
Start: 2020-03-23 | End: 2020-03-25

## 2020-03-25 ENCOUNTER — VIRTUAL VISIT (OUTPATIENT)
Dept: FAMILY MEDICINE | Facility: CLINIC | Age: 60
End: 2020-03-25
Payer: COMMERCIAL

## 2020-03-25 DIAGNOSIS — E78.5 HYPERLIPIDEMIA LDL GOAL <100: ICD-10-CM

## 2020-03-25 DIAGNOSIS — R80.9 MICROALBUMINURIA: Primary | ICD-10-CM

## 2020-03-25 DIAGNOSIS — E11.42 TYPE 2 DIABETES MELLITUS WITH DIABETIC POLYNEUROPATHY, WITHOUT LONG-TERM CURRENT USE OF INSULIN (H): ICD-10-CM

## 2020-03-25 PROCEDURE — 99214 OFFICE O/P EST MOD 30 MIN: CPT | Mod: TEL | Performed by: FAMILY MEDICINE

## 2020-03-25 RX ORDER — ATORVASTATIN CALCIUM 40 MG/1
40 TABLET, FILM COATED ORAL DAILY
Qty: 90 TABLET | Refills: 1 | Status: SHIPPED | OUTPATIENT
Start: 2020-03-25 | End: 2020-08-21

## 2020-03-25 RX ORDER — LISINOPRIL 20 MG/1
20 TABLET ORAL DAILY
Qty: 90 TABLET | Refills: 1 | Status: SHIPPED | OUTPATIENT
Start: 2020-03-25 | End: 2021-01-04 | Stop reason: DRUGHIGH

## 2020-03-25 NOTE — PROGRESS NOTES
"Chin Jade is a 60 year old male who is being evaluated via a billable telephone visit.      The patient has been notified of following:     \"This telephone visit will be conducted via a call between you and your physician/provider. We have found that certain health care needs can be provided without the need for a physical exam.  This service lets us provide the care you need with a short phone conversation.  If a prescription is necessary we can send it directly to your pharmacy.  If lab work is needed we can place an order for that and you can then stop by our lab to have the test done at a later time.    If during the course of the call the physician/provider feels a telephone visit is not appropriate, you will not be charged for this service.\"     Chin Jade complains of    Chief Complaint   Patient presents with     Diabetes       I have reviewed and updated the patient's Past Medical History, Social History, Family History and Medication List.    ALLERGIES  Tetracycline   Diabetes Follow-up    How often are you checking your blood sugar? One time daily  In the morning they run 120-130-140 or so.      What time of day are you checking your blood sugars (select all that apply)?  Before meals  Have you had any blood sugars above 200?  No  Have you had any blood sugars below 70?  Yes 1-2 X a month    What symptoms do you notice when your blood sugar is low?  Shaky    What concerns do you have today about your diabetes? None     Do you have any of these symptoms? (Select all that apply)  No numbness or tingling in feet.  No redness, sores or blisters on feet.  No complaints of excessive thirst.  No reports of blurry vision.  No significant changes to weight.        Hyperlipidemia Follow-Up      Are you regularly taking any medication or supplement to lower your cholesterol?   Yes- lipitor    Are you having muscle aches or other side effects that you think could be caused by your cholesterol lowering " medication?  No    Hypertension Follow-up      Do you check your blood pressure regularly outside of the clinic? No     Are you following a low salt diet? No    Are your blood pressures ever more than 140 on the top number (systolic) OR more   than 90 on the bottom number (diastolic), for example 140/90? No    BP Readings from Last 2 Encounters:   04/15/19 138/72   01/15/19 139/72     Hemoglobin A1C (%)   Date Value   03/02/2020 7.5 (A)   01/16/2020 8.0 (A)     LDL Cholesterol Calculated (mg/dL)   Date Value   12/19/2018 68   12/20/2017 87         How many servings of fruits and vegetables do you eat daily?  2-3    On average, how many sweetened beverages do you drink each day (Examples: soda, juice, sweet tea, etc.  Do NOT count diet or artificially sweetened beverages)?   0    How many days per week do you exercise enough to make your heart beat faster? 3 or less    How many minutes a day do you exercise enough to make your heart beat faster? 20 - 29    How many days per week do you miss taking your medication? 0     Nurse at the [facility] said to check with me on aspirin - yes, continue aspirin    Wanting a prescription for diabetic shoes - he will call when this is needed.     Additional provider notes:     Assessment/Plan:  1. Hyperlipidemia LDL goal <100   due for lipids - last 12/2018  - atorvastatin (LIPITOR) 40 MG tablet; Take 1 tablet (40 mg) by mouth daily  Dispense: 90 tablet; Refill: 1    2. Type 2 diabetes mellitus with diabetic polyneuropathy, without long-term current use of insulin (H)  In a study at the [facility] - concluding this summer  - lisinopril (ZESTRIL) 20 MG tablet; Take 1 tablet (20 mg) by mouth daily  Dispense: 90 tablet; Refill: 1  - Hemoglobin A1c; Future  - Basic metabolic panel; Future  - Lipid panel reflex to direct LDL Fasting; Future  - Albumin Random Urine Quantitative with Creat Ratio; Future    3. Microalbuminuria  Should recheck as it's been >6 months since last check  - Albumin  Random Urine Quantitative with Creat Ratio; Future    Phone call duration:  7 minutes    This patient was seen virtually during the COVID-19 outbreak in attempts to keep healthy patients out of the clinic per CDC guidelines (practicing social distancing).  I evaluated them by phone/evisit and the note reflects our conversation/visit.      Refill done during COVID 19 pandemic.  Given a 90 day supply +1 refill of medications to avoid unnecessary clinic visits at this time.    Recommend in-person visit in 3-4 months            Leia Isabel MD

## 2020-03-30 DIAGNOSIS — R80.9 MICROALBUMINURIA: ICD-10-CM

## 2020-03-30 DIAGNOSIS — E11.42 TYPE 2 DIABETES MELLITUS WITH DIABETIC POLYNEUROPATHY, WITHOUT LONG-TERM CURRENT USE OF INSULIN (H): ICD-10-CM

## 2020-03-30 LAB
ANION GAP SERPL CALCULATED.3IONS-SCNC: 4 MMOL/L (ref 3–14)
BUN SERPL-MCNC: 17 MG/DL (ref 7–30)
CALCIUM SERPL-MCNC: 9.5 MG/DL (ref 8.5–10.1)
CHLORIDE SERPL-SCNC: 108 MMOL/L (ref 94–109)
CHOLEST SERPL-MCNC: 125 MG/DL
CO2 SERPL-SCNC: 27 MMOL/L (ref 20–32)
CREAT SERPL-MCNC: 0.86 MG/DL (ref 0.66–1.25)
GFR SERPL CREATININE-BSD FRML MDRD: >90 ML/MIN/{1.73_M2}
GLUCOSE SERPL-MCNC: 166 MG/DL (ref 70–99)
HBA1C MFR BLD: 7.4 % (ref 0–5.6)
HDLC SERPL-MCNC: 33 MG/DL
LDLC SERPL CALC-MCNC: 64 MG/DL
NONHDLC SERPL-MCNC: 92 MG/DL
POTASSIUM SERPL-SCNC: 3.9 MMOL/L (ref 3.4–5.3)
SODIUM SERPL-SCNC: 139 MMOL/L (ref 133–144)
TRIGL SERPL-MCNC: 141 MG/DL

## 2020-03-30 PROCEDURE — 36415 COLL VENOUS BLD VENIPUNCTURE: CPT | Performed by: FAMILY MEDICINE

## 2020-03-30 PROCEDURE — 82043 UR ALBUMIN QUANTITATIVE: CPT | Performed by: FAMILY MEDICINE

## 2020-03-30 PROCEDURE — 83036 HEMOGLOBIN GLYCOSYLATED A1C: CPT | Performed by: FAMILY MEDICINE

## 2020-03-30 PROCEDURE — 80048 BASIC METABOLIC PNL TOTAL CA: CPT | Performed by: FAMILY MEDICINE

## 2020-03-30 PROCEDURE — 80061 LIPID PANEL: CPT | Performed by: FAMILY MEDICINE

## 2020-03-31 LAB
CREAT UR-MCNC: 131 MG/DL
MICROALBUMIN UR-MCNC: 119 MG/L
MICROALBUMIN/CREAT UR: 90.84 MG/G CR (ref 0–17)

## 2020-05-05 DIAGNOSIS — K21.00 GASTROESOPHAGEAL REFLUX DISEASE WITH ESOPHAGITIS: Primary | ICD-10-CM

## 2020-05-06 NOTE — TELEPHONE ENCOUNTER
"Requested Prescriptions   Pending Prescriptions Disp Refills     famotidine (PEPCID) 20 MG tablet [Pharmacy Med Name: FAMOTIDINE 20MG TABS] 180 tablet 0     Sig: TAKE TWO TABLETS BY MOUTH ONCE DAILY       H2 Blockers Protocol Passed - 5/5/2020  5:02 AM        Passed - Patient is age 12 or older        Passed - Recent (12 mo) or future (30 days) visit within the authorizing provider's specialty     Patient has had an office visit with the authorizing provider or a provider within the authorizing providers department within the previous 12 mos or has a future within next 30 days. See \"Patient Info\" tab in inbasket, or \"Choose Columns\" in Meds & Orders section of the refill encounter.              Passed - Medication is active on med list           Last Written Prescription Date:  11/14/19  Last Fill Quantity: 90,  # refills: 1   Last office visit: 4/15/2019 with prescribing provider:  Chalo   Future Office Visit:            "

## 2020-05-07 RX ORDER — FAMOTIDINE 20 MG/1
TABLET, FILM COATED ORAL
Qty: 180 TABLET | Refills: 0 | Status: SHIPPED | OUTPATIENT
Start: 2020-05-07 | End: 2020-08-21 | Stop reason: DRUGHIGH

## 2020-06-18 ENCOUNTER — DOCUMENTATION ONLY (OUTPATIENT)
Dept: ENDOCRINOLOGY | Facility: CLINIC | Age: 60
End: 2020-06-18

## 2020-06-18 NOTE — PROGRESS NOTES
GRADE study visit    Patient is here for 63 month GRADE study visit. Patient is doing well and continues on Metformin 1000 mg bid in addition to randomized treatment of amaryl.  Dose was increased to 3 mg in AM and 4 mg in PM at last visit because of A1c of 8.0.  Sugars have still been a bit high.  Patient is tolerating medications and has no concerns today.         Plan:   1.  Diabetes- A1c pending today.  Will increase amaryl to 4 mg bid.  If a1c is still >7.5%, will plan to add Lantus.    2.  Follow up in 3 months.     Rebeca Shields PA-C  [facility]  Department of Medicine  Division of Endocrinology and Diabetes

## 2020-07-11 DIAGNOSIS — E11.42 TYPE 2 DIABETES MELLITUS WITH DIABETIC POLYNEUROPATHY, WITHOUT LONG-TERM CURRENT USE OF INSULIN (H): ICD-10-CM

## 2020-07-13 RX ORDER — ASPIRIN 81 MG/1
TABLET, COATED ORAL
Qty: 100 TABLET | Refills: 1 | Status: SHIPPED | OUTPATIENT
Start: 2020-07-13 | End: 2021-02-01

## 2020-07-25 DIAGNOSIS — K21.00 GASTROESOPHAGEAL REFLUX DISEASE WITH ESOPHAGITIS: ICD-10-CM

## 2020-07-25 NOTE — LETTER
[facility]  [address] [address]  [phone]        July 27, 2020  Chin Jade   [address]  [place] [address]        Dear Chin,    I care about your health and have reviewed your health plan. I have reviewed your medical conditions, medication list, and lab results and am making recommendations based on this review, to better manage your health.    You are in particular need of attention regarding:  -Diabetes    I am recommending that you:  -schedule a FOLLOW-UP OFFICE APPOINTMENT with me, at the [facility] location. Please call us at [phone] to schedule.     Thank you for trusting [facility] and we appreciate the opportunity to serve you.  We look forward to supporting your healthcare needs in the future.    Healthy Regards,        Leia Isabel MD/Adelina STREETER RN, BSN

## 2020-07-27 RX ORDER — FAMOTIDINE 40 MG/1
TABLET, FILM COATED ORAL
Qty: 30 TABLET | Refills: 0 | Status: SHIPPED | OUTPATIENT
Start: 2020-07-27 | End: 2020-08-21

## 2020-07-27 NOTE — TELEPHONE ENCOUNTER
"Medication is being filled for 1 time refill only due to:  Patient needs to be seen because 3/25/2020 Virtual Visit note: Recommend in-person visit in 3-4 months.  Letter mailed to pt.    Requested Prescriptions   Pending Prescriptions Disp Refills     famotidine (PEPCID) 40 MG tablet [Pharmacy Med Name: FAMOTIDINE 40MG TABS] 30 tablet 0     Sig: TAKE ONE TABLET BY MOUTH ONCE DAILY       H2 Blockers Protocol Passed - 7/25/2020  5:02 AM        Passed - Patient is age 12 or older        Passed - Recent (12 mo) or future (30 days) visit within the authorizing provider's specialty     Patient has had an office visit with the authorizing provider or a provider within the authorizing providers department within the previous 12 mos or has a future within next 30 days. See \"Patient Info\" tab in inbasket, or \"Choose Columns\" in Meds & Orders section of the refill encounter.              Passed - Medication is active on med list           Adelina STREETER RN, BSN      "

## 2020-08-21 ENCOUNTER — OFFICE VISIT (OUTPATIENT)
Dept: FAMILY MEDICINE | Facility: CLINIC | Age: 60
End: 2020-08-21
Payer: COMMERCIAL

## 2020-08-21 VITALS
BODY MASS INDEX: 32.2 KG/M2 | SYSTOLIC BLOOD PRESSURE: 136 MMHG | DIASTOLIC BLOOD PRESSURE: 60 MMHG | HEART RATE: 76 BPM | RESPIRATION RATE: 16 BRPM | OXYGEN SATURATION: 97 % | WEIGHT: 243 LBS | HEIGHT: 73 IN | TEMPERATURE: 98.5 F

## 2020-08-21 DIAGNOSIS — E11.42 TYPE 2 DIABETES MELLITUS WITH DIABETIC POLYNEUROPATHY, WITHOUT LONG-TERM CURRENT USE OF INSULIN (H): Primary | ICD-10-CM

## 2020-08-21 DIAGNOSIS — K21.00 GASTROESOPHAGEAL REFLUX DISEASE WITH ESOPHAGITIS: ICD-10-CM

## 2020-08-21 DIAGNOSIS — E78.5 HYPERLIPIDEMIA LDL GOAL <100: ICD-10-CM

## 2020-08-21 DIAGNOSIS — Z12.11 SPECIAL SCREENING FOR MALIGNANT NEOPLASMS, COLON: ICD-10-CM

## 2020-08-21 DIAGNOSIS — R80.9 MICROALBUMINURIA: ICD-10-CM

## 2020-08-21 LAB — HBA1C MFR BLD: 7.8 % (ref 0–5.6)

## 2020-08-21 PROCEDURE — 36415 COLL VENOUS BLD VENIPUNCTURE: CPT | Performed by: FAMILY MEDICINE

## 2020-08-21 PROCEDURE — 83036 HEMOGLOBIN GLYCOSYLATED A1C: CPT | Performed by: FAMILY MEDICINE

## 2020-08-21 PROCEDURE — 99214 OFFICE O/P EST MOD 30 MIN: CPT | Performed by: FAMILY MEDICINE

## 2020-08-21 RX ORDER — LISINOPRIL 20 MG/1
20 TABLET ORAL DAILY
Qty: 90 TABLET | Refills: 1 | Status: CANCELLED | OUTPATIENT
Start: 2020-08-21

## 2020-08-21 RX ORDER — LISINOPRIL 30 MG/1
30 TABLET ORAL DAILY
Qty: 90 TABLET | Refills: 1 | Status: SHIPPED | OUTPATIENT
Start: 2020-08-21 | End: 2021-01-04

## 2020-08-21 RX ORDER — FAMOTIDINE 40 MG/1
TABLET, FILM COATED ORAL
Qty: 30 TABLET | Refills: 0 | OUTPATIENT
Start: 2020-08-21

## 2020-08-21 RX ORDER — FAMOTIDINE 40 MG/1
TABLET, FILM COATED ORAL
Qty: 90 TABLET | Refills: 3 | Status: SHIPPED | OUTPATIENT
Start: 2020-08-21 | End: 2021-10-20

## 2020-08-21 RX ORDER — ATORVASTATIN CALCIUM 40 MG/1
40 TABLET, FILM COATED ORAL DAILY
Qty: 90 TABLET | Refills: 1 | Status: SHIPPED | OUTPATIENT
Start: 2020-08-21 | End: 2021-01-04

## 2020-08-21 ASSESSMENT — MIFFLIN-ST. JEOR: SCORE: 1976.12

## 2020-08-21 NOTE — PROGRESS NOTES
Subjective     Chin Jade is a 60 year old male who presents to clinic today for the following health issues:    HPI   Chief Complaint   Patient presents with     Diabetes     Health Maintenance     Fit         Diabetes Follow-up    How often are you checking your blood sugar? One time daily  What time of day are you checking your blood sugars (select all that apply)?  Before meals  Have you had any blood sugars above 200?  No  Have you had any blood sugars below 70?  No    What symptoms do you notice when your blood sugar is low?  Shaky, Dizzy and sweaty, headache    What concerns do you have today about your diabetes? None     Do you have any of these symptoms? (Select all that apply)  Numbness in feet    In a study at the [facility] until December. They recently increase Amaryl to 4 mg in the am and 4 mg at dinner.             Hyperlipidemia Follow-Up      Are you regularly taking any medication or supplement to lower your cholesterol?   Yes- lipitor    Are you having muscle aches or other side effects that you think could be caused by your cholesterol lowering medication?  No    Hypertension Follow-up      Do you check your blood pressure regularly outside of the clinic? No     Are you following a low salt diet? Yes    Are your blood pressures ever more than 140 on the top number (systolic) OR more   than 90 on the bottom number (diastolic), for example 140/90? Always seems good per patient    BP Readings from Last 2 Encounters:   08/21/20 136/60   04/15/19 138/72     Hemoglobin A1C (%)   Date Value   03/30/2020 7.4 (H)   03/02/2020 7.5 (A)     LDL Cholesterol Calculated (mg/dL)   Date Value   03/30/2020 64   12/19/2018 68         How many servings of fruits and vegetables do you eat daily?  2-3    On average, how many sweetened beverages do you drink each day (Examples: soda, juice, sweet tea, etc.  Do NOT count diet or artificially sweetened beverages)?   0    How many days per week do you exercise enough  "to make your heart beat faster? 3 or less    How many minutes a day do you exercise enough to make your heart beat faster? 9 or less    How many days per week do you miss taking your medication? 0        Review of Systems   Constitutional, HEENT, cardiovascular, pulmonary, gi and gu systems are negative, except as otherwise noted.      Objective    /60   Pulse 76   Temp 98.5  F (36.9  C) (Tympanic)   Resp 16   Ht 1.854 m (6' 1\")   Wt 110.2 kg (243 lb)   SpO2 97%   BMI 32.06 kg/m    Body mass index is 32.06 kg/m .  Physical Exam   GENERAL: healthy, alert and no distress  NECK: no adenopathy, no asymmetry, masses, or scars and thyroid normal to palpation  RESP: lungs clear to auscultation - no rales, rhonchi or wheezes  CV: regular rates and rhythm, grade 2/6 murmur heard best over the lusb, peripheral pulses strong and no peripheral edema  ABDOMEN: soft, nontender, no hepatosplenomegaly, no masses and bowel sounds normal  MS: no gross musculoskeletal defects noted, no edema    Results for orders placed or performed in visit on 08/21/20 (from the past 24 hour(s))   Hemoglobin A1c   Result Value Ref Range    Hemoglobin A1C 7.8 (H) 0 - 5.6 %           Assessment & Plan     Type 2 diabetes mellitus with diabetic polyneuropathy, without long-term current use of insulin (H)  Control is fair.  He will f/u after he's done with the gali  - Hemoglobin A1c  - STUDY MEDICATION; Glimepiride 4 mg in the morning and 4 mg with dinner. Medication provided by GRADE study.    Hyperlipidemia LDL goal <100     - atorvastatin (LIPITOR) 40 MG tablet; Take 1 tablet (40 mg) by mouth daily    Microalbuminuria   increase lisinopril to 30 mg, blood pressure control could be better, diabetes control could be better.   - lisinopril (ZESTRIL) 30 MG tablet; Take 1 tablet (30 mg) by mouth daily    Special screening for malignant neoplasms, colon     - Fecal colorectal cancer screen (FIT); Future    Gastroesophageal reflux disease with " esophagitis     - famotidine (PEPCID) 40 MG tablet; Take 1 tablet daily     Tobacco Cessation:   reports that he has been smoking cigarettes. He has a 15.00 pack-year smoking history. He has quit using smokeless tobacco.  Tobacco Cessation Action Plan: Information offered: Patient not interested at this time              No follow-ups on file.    Leia Isabel MD  [facility]

## 2020-08-21 NOTE — PATIENT INSTRUCTIONS
"Health Maintenance reviewed and plan for update discussed.  mail in Fit kit     Increase lisinopril to 30 mg daily    Follow up after the diabetes study is completed at the [facility].        The new Shingrix is supposed to be more effective at preventing shingles.  Even if you had Zostavax, this is recommended. I encourage people to check with their pharmacy (or ours) and have them run it through to check the cost.  It's often better covered through your pharmacy benefit.  It is a two part vaccine series - one now and one in 2-6 months.  Many people will feel a bit \"flu like\" with fever and body aches for a few days after the injection.  Taking ibuprofen can help with this.      Our Clinic hours are:  Mondays    7:20 am - 7 pm  Tues -  Fri  7:20 am - 5 pm    Clinic Phone: [phone]    The clinic lab opens at 7:30 am Mon - Fri and appointments are required.    [facility]  Ph. [phone]  Monday  8 am - 7pm  Tues - Fri 8 am - 5:30 pm           "

## 2020-08-21 NOTE — TELEPHONE ENCOUNTER
"Requested Prescriptions   Pending Prescriptions Disp Refills     famotidine (PEPCID) 40 MG tablet [Pharmacy Med Name: FAMOTIDINE 40MG TABS] 30 tablet 0     Sig: TAKE ONE TABLET BY MOUTH ONCE DAILY (NEED TO BE SEEN IN CLINIC FOR FURTHER REFILLS)       H2 Blockers Protocol Passed - 8/21/2020  5:01 AM        Passed - Patient is age 12 or older        Passed - Recent (12 mo) or future (30 days) visit within the authorizing provider's specialty     Patient has had an office visit with the authorizing provider or a provider within the authorizing providers department within the previous 12 mos or has a future within next 30 days. See \"Patient Info\" tab in inbasket, or \"Choose Columns\" in Meds & Orders section of the refill encounter.              Passed - Medication is active on med list             "

## 2020-08-28 ENCOUNTER — OFFICE VISIT (OUTPATIENT)
Dept: FAMILY MEDICINE | Facility: CLINIC | Age: 60
End: 2020-08-28
Payer: COMMERCIAL

## 2020-08-28 DIAGNOSIS — D48.5 NEOPLASM OF UNCERTAIN BEHAVIOR OF SKIN: Primary | ICD-10-CM

## 2020-08-28 PROCEDURE — 11310 SHAVE SKIN LESION 0.5 CM/<: CPT | Performed by: FAMILY MEDICINE

## 2020-08-28 PROCEDURE — 88305 TISSUE EXAM BY PATHOLOGIST: CPT | Performed by: FAMILY MEDICINE

## 2020-08-28 NOTE — PROGRESS NOTES
SUBJECTIVE:   Chin Jade is a 60 year old male who would like a complete skin check today. There is no personal history of skin cancer. There is family history of skin cancer. See below for history and description of each lesion.    OBJECTIVE:   Appears well, alert, oriented, pleasant and cooperative. Vitals are as noted by the nurse. Complete skin exam is performed. Lesion on right chin with patient's observations stated as bleeding, non-healing, exam of this area shows rule out basal cell carcinoma, features irregular border, size 4 mm.    ASSESSMENT:  rule out basal cell carcinoma      Risks, benefits and alternatives discussed   Procedure:  Tangential bx in typical fashion .  Area cleaned with betadyne and anesthetized with 1% lidocaine with epi . Then a straight blade used to remove the lesion and it was sent to pathology.  Bleeding was stopped with aluminum chloride solution and silver nitrate.  Dressed with bacitracin and bandage. Pt tolerated procedure well.    PLAN:   Tangential biopsy done.   Send for pathology, will notify patient when results are available    Leia Isabel M.D.

## 2020-09-01 PROBLEM — C44.310 BCC (BASAL CELL CARCINOMA), FACE: Status: ACTIVE | Noted: 2020-09-01

## 2020-09-01 LAB — COPATH REPORT: NORMAL

## 2020-09-01 NOTE — RESULT ENCOUNTER NOTE
Notify patient -   The lesion I removed from his chin was a basal cell carcinoma, but it was removed entirely, the margins were clear.     No need for re-excision.     Leia Isabel M.D.

## 2020-09-13 DIAGNOSIS — E78.5 HYPERLIPIDEMIA LDL GOAL <100: ICD-10-CM

## 2020-09-14 RX ORDER — ATORVASTATIN CALCIUM 40 MG/1
TABLET, FILM COATED ORAL
Qty: 90 TABLET | Refills: 1 | OUTPATIENT
Start: 2020-09-14

## 2020-09-14 NOTE — TELEPHONE ENCOUNTER
"Requested Prescriptions   Pending Prescriptions Disp Refills     atorvastatin (LIPITOR) 40 MG tablet [Pharmacy Med Name: ATORVASTATIN CALCIUM 40MG TABS] 90 tablet 1     Sig: TAKE ONE TABLET BY MOUTH ONCE DAILY       Statins Protocol Passed - 9/13/2020  5:02 AM        Passed - LDL on file in past 12 months     Recent Labs   Lab Test 03/30/20  1453   LDL 64             Passed - No abnormal creatine kinase in past 12 months     No lab results found.             Passed - Recent (12 mo) or future (30 days) visit within the authorizing provider's specialty     Patient has had an office visit with the authorizing provider or a provider within the authorizing providers department within the previous 12 mos or has a future within next 30 days. See \"Patient Info\" tab in inbasket, or \"Choose Columns\" in Meds & Orders section of the refill encounter.              Passed - Medication is active on med list        Passed - Patient is age 18 or older             "

## 2020-09-16 ENCOUNTER — DOCUMENTATION ONLY (OUTPATIENT)
Dept: ENDOCRINOLOGY | Facility: CLINIC | Age: 60
End: 2020-09-16

## 2020-09-16 LAB
ALBUMIN URINE MG/G CR: 52.27 MG/G CREATININE
ALBUMIN URINE MG/SPEC: NORMAL
CREATININE URINE: NORMAL
HBA1C MFR BLD: 7.8 % (ref 0–5.6)

## 2020-09-16 NOTE — PROGRESS NOTES
[facility] Study Final Visit    Patient is here for 72 month [facility] study final visit. Current study medications are metformin 1000 mg twice daily and glimepiride 4 mg morning and 4 mg evening. He was started on Lantus in October 2020 due to elevated A1c. Since starting insulin, BGs ranged 110-160. Rare low BG.     He has tried to work on portion control and healthy diet. He is physically active at work but no scheduled exercise. Has had neuropathy with numbness symptoms.     Last A1c 7.8    Lab Results:   A1c  7.6    Plan:   1. Type 2 diabetes. Continue current regimen. Discussed alternative therapy options that he could try after the study is end.  2. Encourage to add more green vegetables into his meal  3. Encourage him to continue with walking  4. Follow up with PCP    Cherie De Souza MD  Division of Diabetes and Endocrinology  Department of Medicine

## 2020-10-13 ENCOUNTER — HOSPITAL ENCOUNTER (EMERGENCY)
Facility: CLINIC | Age: 60
Discharge: HOME | End: 2020-10-13
Attending: NURSE PRACTITIONER | Admitting: NURSE PRACTITIONER
Payer: OTHER MISCELLANEOUS

## 2020-10-13 ENCOUNTER — APPOINTMENT (OUTPATIENT)
Dept: GENERAL RADIOLOGY | Facility: CLINIC | Age: 60
End: 2020-10-13
Attending: NURSE PRACTITIONER
Payer: OTHER MISCELLANEOUS

## 2020-10-13 VITALS
HEART RATE: 77 BPM | TEMPERATURE: 97.9 F | BODY MASS INDEX: 32.19 KG/M2 | WEIGHT: 244 LBS | RESPIRATION RATE: 16 BRPM | SYSTOLIC BLOOD PRESSURE: 156 MMHG | OXYGEN SATURATION: 100 % | DIASTOLIC BLOOD PRESSURE: 87 MMHG

## 2020-10-13 DIAGNOSIS — S61.209A AVULSION OF FINGERTIP, INITIAL ENCOUNTER: ICD-10-CM

## 2020-10-13 DIAGNOSIS — S62.624A CLOSED DISPLACED FRACTURE OF MIDDLE PHALANX OF RIGHT RING FINGER, INITIAL ENCOUNTER: ICD-10-CM

## 2020-10-13 PROCEDURE — 26720 TREAT FINGER FRACTURE EACH: CPT | Mod: F8 | Performed by: NURSE PRACTITIONER

## 2020-10-13 PROCEDURE — 99284 EMERGENCY DEPT VISIT MOD MDM: CPT | Mod: 25 | Performed by: NURSE PRACTITIONER

## 2020-10-13 PROCEDURE — 73140 X-RAY EXAM OF FINGER(S): CPT | Mod: RT

## 2020-10-13 PROCEDURE — 26720 TREAT FINGER FRACTURE EACH: CPT | Mod: 54 | Performed by: NURSE PRACTITIONER

## 2020-10-13 RX ORDER — CEPHALEXIN 500 MG/1
500 CAPSULE ORAL 4 TIMES DAILY
Qty: 40 CAPSULE | Refills: 0 | Status: SHIPPED | OUTPATIENT
Start: 2020-10-13 | End: 2020-10-23

## 2020-10-13 ASSESSMENT — ENCOUNTER SYMPTOMS
WEAKNESS: 0
LIGHT-HEADEDNESS: 0
DIZZINESS: 0
NUMBNESS: 0
JOINT SWELLING: 0
HEADACHES: 0
WOUND: 1

## 2020-10-13 NOTE — ED NOTES
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 Bleeding controled

## 2020-10-13 NOTE — ED PROVIDER NOTES
History     Chief Complaint   Patient presents with     Hand Injury     was at work and injured his ring finger rt hand      HPI  Chin Jade is a 60 year old male who presents to the emergency department for evaluation after right ring finger injury. 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 Bleeding controlled prior to arrival. Denies numbness or tingling. Last tetanus in 2015.     Allergies:  Allergies   Allergen Reactions     Tetracycline Rash       Problem List:    Patient Active Problem List    Diagnosis Date Noted     BCC (basal cell carcinoma), face 09/01/2020     Priority: Medium     Nodular basal cell carcinoma right chin 8/2020       Gastroesophageal reflux disease with esophagitis 10/05/2018     Priority: Medium     Connective tissue and disc stenosis of intervertebral foramina of cervical region 03/06/2017     Priority: Medium     DDD (degenerative disc disease), cervical 03/06/2017     Priority: Medium     Microalbuminuria 01/12/2016     Priority: Medium     Type 2 diabetes mellitus with diabetic polyneuropathy, without long-term current use of insulin (H) 10/21/2015     Priority: Medium     ED (erectile dysfunction) 09/22/2014     Priority: Medium     Diabetic neuropathy (H) 09/22/2014     Priority: Medium     Tobacco use disorder 11/04/2013     Priority: Medium     Hyperlipidemia LDL goal <100 07/31/2013     Priority: Medium        Past Medical History:    Past Medical History:   Diagnosis Date     Hypertension        Past Surgical History:    Past Surgical History:   Procedure Laterality Date     RELEASE TRIGGER FINGER Right 1/15/2019    Procedure: LONG A1 PULLEY RELEASE, RIGHT;  Surgeon: Harshal Barcenas MD;  Location: [facility] OR       Family History:    Family History   Problem Relation Age of Onset     Thyroid Disease Mother      Cardiovascular Father      C.A.D. Father      Thyroid Disease Sister      Psychotic Disorder  Sister         anxiety       Social History:  Marital Status:   [2]  Social History     Tobacco Use     Smoking status: Current Every Day Smoker     Packs/day: 0.50     Years: 30.00     Pack years: 15.00     Types: Cigarettes     Smokeless tobacco: Former User   Substance Use Topics     Alcohol use: No     Drug use: No        Medications:         cephALEXin (KEFLEX) 500 MG capsule       acyclovir (ZOVIRAX) 400 MG tablet       ASPIRIN LOW DOSE 81 MG EC tablet       atorvastatin (LIPITOR) 40 MG tablet       blood glucose (NO BRAND SPECIFIED) test strip       blood glucose monitoring (ACCU-CHEK KERMIT PLUS) meter device kit       blood glucose monitoring (FREESTYLE FREEDOM LITE) meter device kit       blood glucose monitoring (NO BRAND SPECIFIED) test strip       blood glucose monitoring (NO BRAND SPECIFIED) test strip       famotidine (PEPCID) 40 MG tablet       lisinopril (ZESTRIL) 20 MG tablet       lisinopril (ZESTRIL) 30 MG tablet       order for DME       STUDY MEDICATION       STUDY MEDICATION          Review of Systems   Musculoskeletal: Negative for joint swelling.   Skin: Positive for wound.   Neurological: Negative for dizziness, weakness, light-headedness, numbness and headaches.   All other systems reviewed and are negative.      Physical Exam   BP: (!) 156/87  Pulse: 77  Temp: 97.9  F (36.6  C)  Resp: 16  Weight: 110.7 kg (244 lb)  SpO2: 100 %    Physical Exam  Constitutional:       General: He is not in acute distress.     Appearance: He is well-developed. He is not diaphoretic.   HENT:      Head: Normocephalic.   Eyes:      Conjunctiva/sclera: Conjunctivae normal.      Pupils: Pupils are equal, round, and reactive to light.   Cardiovascular:      Rate and Rhythm: Normal rate and regular rhythm.      Pulses: Normal pulses.   Pulmonary:      Effort: Pulmonary effort is normal. No respiratory distress.      Breath sounds: Normal breath sounds and air entry. No decreased air movement. No decreased  breath sounds, wheezing or rhonchi.   Musculoskeletal: Normal range of motion.        Hands:    Skin:     General: Skin is warm.      Capillary Refill: Capillary refill takes less than 2 seconds.   Neurological:      General: No focal deficit present.      Mental Status: He is alert and oriented to person, place, and time.   Psychiatric:         Mood and Affect: Mood normal.       ED Course        Procedures    Results for orders placed or performed during the hospital encounter of 10/13/20 (from the past 24 hour(s))   XR Finger Right G/E 2 Views    Narrative    FINGER RIGHT TWO OR MORE VIEWS  October 13, 2020 9:20 AM     INDICATION: Right fourth finger injury and pain.     COMPARISON: None.      Impression    IMPRESSION:  1.  There is irregularity of the dorsal base of the fourth finger  middle phalanx (lateral view), suspicious for nondisplaced fracture.  2.  No additional areas suspicious for fracture.  3.  Normal joint spacing and alignment.  4.  Soft tissue laceration in the distal fourth finger. No radiodense  foreign body.    QASIM COTTRELL MD       Medications - No data to display    Assessments & Plan (with Medical Decision Making)   Chin Jade is a 60 year old male who presents to the emergency department for evaluation after right ring finger injury. 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 Exam as above. Wound irrigated. Xray obtained with nondisplaced fracture of the dorsal base of the 4th right middle phalanx, no foreign body present. Wound care provided and tube gauze applied. Prophylactic treatment with Keflex. Follow up with hand specialist. Return precautions reviewed, all questions answered. Patient is agreeable to plan of care and discharged in no acute distress.     I have reviewed the nursing notes.    I have reviewed the findings, diagnosis, plan and need for follow up with the patient.     Discharge Medication List  as of 10/13/2020 10:07 AM      START taking these medications    Details   cephALEXin (KEFLEX) 500 MG capsule Take 1 capsule (500 mg) by mouth 4 times daily for 10 days, Disp-40 capsule, R-0, E-Prescribe           Final diagnoses:   Avulsion of fingertip, initial encounter   Closed displaced fracture of middle phalanx of right ring finger, initial encounter     10/13/2020   [facility] EMERGENCY DEPT     Nano Child, APRN CNP  10/13/20 0417

## 2020-10-13 NOTE — ED TRIAGE NOTES
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

## 2020-10-13 NOTE — ED AVS SNAPSHOT
[facility] Emergency Dept  [address] [address]  Phone: [phone]  Fax: [phone]                                    Chin Jade   MRN: [id number]    Department: [facility] Emergency Dept   Date of Visit: 10/13/2020           After Visit Summary Signature Page    I have received my discharge instructions, and my questions have been answered. I have discussed any challenges I see with this plan with the nurse or doctor.    ..........................................................................................................................................  Patient/Patient Representative Signature      ..........................................................................................................................................  Patient Representative Print Name and Relationship to Patient    ..................................................               ................................................  Date                                   Time    ..........................................................................................................................................  Reviewed by Signature/Title    ...................................................              ..............................................  Date                                               Time          22EPIC Rev 08/18

## 2020-10-14 ENCOUNTER — OFFICE VISIT (OUTPATIENT)
Dept: FAMILY MEDICINE | Facility: CLINIC | Age: 60
End: 2020-10-14
Payer: OTHER MISCELLANEOUS

## 2020-10-14 VITALS
BODY MASS INDEX: 31.32 KG/M2 | WEIGHT: 244 LBS | SYSTOLIC BLOOD PRESSURE: 130 MMHG | TEMPERATURE: 98.2 F | HEIGHT: 74 IN | OXYGEN SATURATION: 98 % | HEART RATE: 87 BPM | DIASTOLIC BLOOD PRESSURE: 68 MMHG | RESPIRATION RATE: 16 BRPM

## 2020-10-14 DIAGNOSIS — S61.209D AVULSION OF FINGERTIP, SUBSEQUENT ENCOUNTER: Primary | ICD-10-CM

## 2020-10-14 PROCEDURE — 99213 OFFICE O/P EST LOW 20 MIN: CPT | Performed by: INTERNAL MEDICINE

## 2020-10-14 ASSESSMENT — PAIN SCALES - GENERAL: PAINLEVEL: MILD PAIN (3)

## 2020-10-14 ASSESSMENT — MIFFLIN-ST. JEOR: SCORE: 1996.53

## 2020-10-14 NOTE — PROGRESS NOTES
"Subjective     Chin Jade is a 60 year old male who presents to clinic today for the following health issues:    HPI         ED/UC Followup:  Chief Complaint   Patient presents with     Work Comp     ED Follow up from 10/13/2020 right ring finger.  DOI 10/13/2020         Facility:  [facility] ED  Date of visit: 10/13/2020  Reason for visit: Follow up on Right index finger  Current Status:  \"Doing good, only hurts if bumped\"       Chin was in the ED yesterday for R 4th finger pad avulsion sustained from a screw on an air compressor at work.  X-ray showed a possible non-displaced fracture at the dorsal base of the middle phalanx.  Wound care, tube gauze, and prophylactic Keflex were provided and he was advised to follow-up with a hand specialist (referral placed).      He presents for recheck of the injury.      Review of Systems   Constitutional, MSK systems are negative, except as otherwise noted.      Objective    /68   Pulse 87   Temp 98.2  F (36.8  C) (Tympanic)   Resp 16   Ht 1.88 m (6' 2\")   Wt 110.7 kg (244 lb)   SpO2 98%   BMI 31.33 kg/m    Body mass index is 31.33 kg/m .  Physical Exam   GENERAL: healthy, alert and no distress  MS: avulsion injury to pad of R 4th finger, wound is open about 2cm in diameter, no skin flap, no surrounding erythema, slight bleeding but no other discharge.            Assessment & Plan     Avulsion of fingertip, subsequent encounter    Doing well since injury yesterday, no sign of infection.  Wound cleaned with chlorhexidine and vaseline non-stick layer placed before rebandaging.  He does not have any pain over the site of the possible fracture, so perhaps this was artifact.  He will continue antibiotics and follow-up with hand ortho tomorrow as scheduled.  Work note provided- he plans to return to work on Monday, advised minimizing the use of the right hand as much as possible over the next 1-2 weeks, progress activities as tolerated.         Zion Hannah " MD Dawit  [facility]

## 2020-10-14 NOTE — LETTER
[facility]  [address]  [facility] [address]  Phone: [phone]    October 14, 2020        Chin Jade   [address]  [facility] [place] [place] [address]          To whom it may concern:    RE: Chin Jade    Patient was seen and treated today at our clinic.  He can return to work on Monday, 10/19/20 with the following restrictions:  Minimize usage of right hand as much as possible for 1-2 weeks, gradually add back in activities as tolerated as injury heals.     Please contact me for questions or concerns.      Sincerely,        Zion Pastor MD

## 2020-10-14 NOTE — PATIENT INSTRUCTIONS
Our Clinic hours are:  Mondays    7:20 am - 7 pm  Tues -  Fri  7:20 am - 5 pm    Clinic Phone: [phone]    The clinic lab opens at 7:30 am Mon - Fri and appointments are required.    [facility]. [phone]  Monday  8 am - 7pm  Tues - Fri 8 am - 5:30 pm

## 2020-10-14 NOTE — H&P (VIEW-ONLY)
"Subjective     Chin Jade is a 60 year old male who presents to clinic today for the following health issues:    HPI         ED/UC Followup:  Chief Complaint   Patient presents with     Work Comp     ED Follow up from 10/13/2020 right ring finger.  DOI 10/13/2020         Facility:  [facility] ED  Date of visit: 10/13/2020  Reason for visit: Follow up on Right index finger  Current Status:  \"Doing good, only hurts if bumped\"       Chin was in the ED yesterday for R 4th finger pad avulsion sustained from a screw on an air compressor at work.  X-ray showed a possible non-displaced fracture at the dorsal base of the middle phalanx.  Wound care, tube gauze, and prophylactic Keflex were provided and he was advised to follow-up with a hand specialist (referral placed).      He presents for recheck of the injury.      Review of Systems   Constitutional, MSK systems are negative, except as otherwise noted.      Objective    /68   Pulse 87   Temp 98.2  F (36.8  C) (Tympanic)   Resp 16   Ht 1.88 m (6' 2\")   Wt 110.7 kg (244 lb)   SpO2 98%   BMI 31.33 kg/m    Body mass index is 31.33 kg/m .  Physical Exam   GENERAL: healthy, alert and no distress  MS: avulsion injury to pad of R 4th finger, wound is open about 2cm in diameter, no skin flap, no surrounding erythema, slight bleeding but no other discharge.            Assessment & Plan     Avulsion of fingertip, subsequent encounter    Doing well since injury yesterday, no sign of infection.  Wound cleaned with chlorhexidine and vaseline non-stick layer placed before rebandaging.  He does not have any pain over the site of the possible fracture, so perhaps this was artifact.  He will continue antibiotics and follow-up with hand ortho tomorrow as scheduled.  Work note provided- he plans to return to work on Monday, advised minimizing the use of the right hand as much as possible over the next 1-2 weeks, progress activities as tolerated.         Zion Hannah " MD Dawit  [facility]

## 2020-10-15 ENCOUNTER — TRANSFERRED RECORDS (OUTPATIENT)
Dept: HEALTH INFORMATION MANAGEMENT | Facility: CLINIC | Age: 60
End: 2020-10-15

## 2020-10-20 DIAGNOSIS — Z12.11 SPECIAL SCREENING FOR MALIGNANT NEOPLASMS, COLON: ICD-10-CM

## 2020-10-20 PROCEDURE — 82274 ASSAY TEST FOR BLOOD FECAL: CPT | Performed by: FAMILY MEDICINE

## 2020-10-22 LAB — HEMOCCULT STL QL IA: POSITIVE

## 2020-10-23 DIAGNOSIS — R19.5 POSITIVE FIT (FECAL IMMUNOCHEMICAL TEST): Primary | ICD-10-CM

## 2020-10-26 DIAGNOSIS — Z11.59 ENCOUNTER FOR SCREENING FOR OTHER VIRAL DISEASES: Primary | ICD-10-CM

## 2020-11-03 DIAGNOSIS — Z11.59 ENCOUNTER FOR SCREENING FOR OTHER VIRAL DISEASES: ICD-10-CM

## 2020-11-03 PROCEDURE — U0003 INFECTIOUS AGENT DETECTION BY NUCLEIC ACID (DNA OR RNA); SEVERE ACUTE RESPIRATORY SYNDROME CORONAVIRUS 2 (SARS-COV-2) (CORONAVIRUS DISEASE [COVID-19]), AMPLIFIED PROBE TECHNIQUE, MAKING USE OF HIGH THROUGHPUT TECHNOLOGIES AS DESCRIBED BY CMS-2020-01-R: HCPCS | Performed by: SURGERY

## 2020-11-04 LAB
SARS-COV-2 RNA SPEC QL NAA+PROBE: NOT DETECTED
SPECIMEN SOURCE: NORMAL

## 2020-11-05 ENCOUNTER — ANESTHESIA EVENT (OUTPATIENT)
Dept: GASTROENTEROLOGY | Facility: CLINIC | Age: 60
End: 2020-11-05
Payer: COMMERCIAL

## 2020-11-05 NOTE — ANESTHESIA PREPROCEDURE EVALUATION
Anesthesia Pre-Procedure Evaluation    Patient: Chin Jade   MRN: [id number] : 1960          Preoperative Diagnosis: Positive FIT (fecal immunochemical test) [R19.5]    Procedure(s):  COLONOSCOPY    Past Medical History:   Diagnosis Date     Hypertension      Past Surgical History:   Procedure Laterality Date     RELEASE TRIGGER FINGER Right 1/15/2019    Procedure: LONG A1 PULLEY RELEASE, RIGHT;  Surgeon: Harshal Barcenas MD;  Location: [facility] OR       Anesthesia Evaluation     . Pt has had prior anesthetic. Type: General and MAC    No history of anesthetic complications          ROS/MED HX    ENT/Pulmonary:     (+)tobacco use, Current use 1 packs/day  , . .    Neurologic:  - neg neurologic ROS     Cardiovascular:     (+) Dyslipidemia, hypertension----. : . . . :. .       METS/Exercise Tolerance:     Hematologic:  - neg hematologic  ROS       Musculoskeletal:   (+)  other musculoskeletal-       GI/Hepatic:     (+) GERD Asymptomatic on medication,       Renal/Genitourinary:  - ROS Renal section negative       Endo:     (+) type II DM Diabetic complications: neuropathy, .      Psychiatric:  - neg psychiatric ROS       Infectious Disease:         Malignancy:   (+) Malignancy History of Skin          Other:                          Physical Exam  Normal systems: cardiovascular, pulmonary and dental    Airway   Mallampati: II  TM distance: >3 FB  Neck ROM: full    Dental     Cardiovascular       Pulmonary             Lab Results   Component Value Date    WBC 8.8 2014    HGB 13.1 (L) 2014    HCT 41.3 2014     2014    CRP <5.0 2014    SED 9 2014     2020    POTASSIUM 3.9 2020    CHLORIDE 108 2020    CO2 27 2020    BUN 17 2020    CR 0.86 2020     (H) 2020    VELIA 9.5 2020    ALBUMIN 3.2 (L) 2013    PROTTOTAL 5.8 (L) 2013    ALT 62 2013    AST 25 2014    ALKPHOS 89 2013     "BILITOTAL 0.6 12/13/2013    TSH 0.96 04/15/2019       Preop Vitals  BP Readings from Last 3 Encounters:   10/14/20 130/68   10/13/20 (!) 156/87   08/21/20 136/60    Pulse Readings from Last 3 Encounters:   10/14/20 87   10/13/20 77   08/21/20 76      Resp Readings from Last 3 Encounters:   10/14/20 16   10/13/20 16   08/21/20 16    SpO2 Readings from Last 3 Encounters:   10/14/20 98%   10/13/20 100%   08/21/20 97%      Temp Readings from Last 1 Encounters:   10/14/20 36.8  C (98.2  F) (Tympanic)    Ht Readings from Last 1 Encounters:   10/14/20 1.88 m (6' 2\")      Wt Readings from Last 1 Encounters:   10/14/20 110.7 kg (244 lb)    Estimated body mass index is 31.33 kg/m  as calculated from the following:    Height as of 10/14/20: 1.88 m (6' 2\").    Weight as of 10/14/20: 110.7 kg (244 lb).       Anesthesia Plan      History & Physical Review  History and physical reviewed and following examination; no interval change.    ASA Status:  3 .    NPO Status:  > 4 hours    Plan for General and MAC Reason for MAC:  Deep or markedly invasive procedure (G8)           Postoperative Care      Consents  Anesthetic plan, risks, benefits and alternatives discussed with:  Patient..                 CHETNA García CRNA  "

## 2020-11-06 ENCOUNTER — ANESTHESIA (OUTPATIENT)
Dept: GASTROENTEROLOGY | Facility: CLINIC | Age: 60
End: 2020-11-06
Payer: COMMERCIAL

## 2020-11-06 ENCOUNTER — HOSPITAL ENCOUNTER (OUTPATIENT)
Facility: CLINIC | Age: 60
Discharge: HOME | End: 2020-11-06
Attending: SURGERY | Admitting: SURGERY
Payer: COMMERCIAL

## 2020-11-06 VITALS
TEMPERATURE: 98 F | HEIGHT: 74 IN | WEIGHT: 244 LBS | OXYGEN SATURATION: 97 % | SYSTOLIC BLOOD PRESSURE: 129 MMHG | DIASTOLIC BLOOD PRESSURE: 77 MMHG | BODY MASS INDEX: 31.32 KG/M2 | RESPIRATION RATE: 20 BRPM | HEART RATE: 71 BPM

## 2020-11-06 LAB
COLONOSCOPY: NORMAL
GLUCOSE BLDC GLUCOMTR-MCNC: 171 MG/DL (ref 70–99)

## 2020-11-06 PROCEDURE — 370N000002 HC ANESTHESIA TECHNICAL FEE, EACH ADDTL 15 MIN: Performed by: SURGERY

## 2020-11-06 PROCEDURE — 370N000001 HC ANESTHESIA TECHNICAL FEE, 1ST 30 MIN: Performed by: SURGERY

## 2020-11-06 PROCEDURE — 999N001017 HC STATISTIC GLUCOSE BY METER IP

## 2020-11-06 PROCEDURE — 258N000003 HC RX IP 258 OP 636: Performed by: SURGERY

## 2020-11-06 PROCEDURE — 45378 DIAGNOSTIC COLONOSCOPY: CPT | Performed by: SURGERY

## 2020-11-06 PROCEDURE — 250N000009 HC RX 250: Performed by: SURGERY

## 2020-11-06 PROCEDURE — 250N000011 HC RX IP 250 OP 636: Performed by: NURSE ANESTHETIST, CERTIFIED REGISTERED

## 2020-11-06 PROCEDURE — 250N000009 HC RX 250: Performed by: NURSE ANESTHETIST, CERTIFIED REGISTERED

## 2020-11-06 RX ORDER — SODIUM CHLORIDE, SODIUM LACTATE, POTASSIUM CHLORIDE, CALCIUM CHLORIDE 600; 310; 30; 20 MG/100ML; MG/100ML; MG/100ML; MG/100ML
INJECTION, SOLUTION INTRAVENOUS CONTINUOUS
Status: DISCONTINUED | OUTPATIENT
Start: 2020-11-06 | End: 2020-11-06 | Stop reason: HOSPADM

## 2020-11-06 RX ORDER — LIDOCAINE HYDROCHLORIDE 10 MG/ML
INJECTION, SOLUTION INFILTRATION; PERINEURAL PRN
Status: DISCONTINUED | OUTPATIENT
Start: 2020-11-06 | End: 2020-11-06

## 2020-11-06 RX ORDER — PROPOFOL 10 MG/ML
INJECTION, EMULSION INTRAVENOUS CONTINUOUS PRN
Status: DISCONTINUED | OUTPATIENT
Start: 2020-11-06 | End: 2020-11-06

## 2020-11-06 RX ORDER — PROPOFOL 10 MG/ML
INJECTION, EMULSION INTRAVENOUS PRN
Status: DISCONTINUED | OUTPATIENT
Start: 2020-11-06 | End: 2020-11-06

## 2020-11-06 RX ORDER — LIDOCAINE 40 MG/G
CREAM TOPICAL
Status: DISCONTINUED | OUTPATIENT
Start: 2020-11-06 | End: 2020-11-06 | Stop reason: HOSPADM

## 2020-11-06 RX ADMIN — PROPOFOL 100 MG: 10 INJECTION, EMULSION INTRAVENOUS at 08:00

## 2020-11-06 RX ADMIN — SODIUM CHLORIDE, POTASSIUM CHLORIDE, SODIUM LACTATE AND CALCIUM CHLORIDE: 600; 310; 30; 20 INJECTION, SOLUTION INTRAVENOUS at 07:34

## 2020-11-06 RX ADMIN — PROPOFOL 200 MCG/KG/MIN: 10 INJECTION, EMULSION INTRAVENOUS at 08:02

## 2020-11-06 RX ADMIN — LIDOCAINE HYDROCHLORIDE 0.1 ML: 10 INJECTION, SOLUTION EPIDURAL; INFILTRATION; INTRACAUDAL; PERINEURAL at 07:34

## 2020-11-06 RX ADMIN — LIDOCAINE HYDROCHLORIDE 50 MG: 10 INJECTION, SOLUTION INFILTRATION; PERINEURAL at 08:00

## 2020-11-06 RX ADMIN — PROPOFOL 30 MG: 10 INJECTION, EMULSION INTRAVENOUS at 08:20

## 2020-11-06 ASSESSMENT — MIFFLIN-ST. JEOR: SCORE: 1996.53

## 2020-11-06 ASSESSMENT — LIFESTYLE VARIABLES: TOBACCO_USE: 1

## 2020-11-06 NOTE — ANESTHESIA CARE TRANSFER NOTE
Patient: Chin Jade    Procedure(s):  COLONOSCOPY    Diagnosis: Positive FIT (fecal immunochemical test) [R19.5]  Diagnosis Additional Information: No value filed.    Anesthesia Type:   No value filed.     Note:  Airway :Room Air  Patient transferred to:Phase II  Comments: Patient's VSS. Spontaneous respirations. Patient awake and oriented. IV patent. Report to RN.Handoff Report: Identifed the Patient, Identified the Reponsible Provider, Reviewed the pertinent medical history, Discussed the surgical course, Reviewed Intra-OP anesthesia mangement and issues during anesthesia, Set expectations for post-procedure period and Allowed opportunity for questions and acknowledgement of understanding      Vitals: (Last set prior to Anesthesia Care Transfer)    CRNA VITALS  11/6/2020 0757 - 11/6/2020 0828      11/6/2020             Pulse:  76    SpO2:  97 %                Electronically Signed By: CHETNA Michaels CRNA  November 6, 2020  8:28 AM

## 2020-11-06 NOTE — ANESTHESIA POSTPROCEDURE EVALUATION
Patient: Chin Jade    Procedure(s):  COLONOSCOPY    Diagnosis:Positive FIT (fecal immunochemical test) [R19.5]  Diagnosis Additional Information: No value filed.    Anesthesia Type:  No value filed.    Note:  Anesthesia Post Evaluation    Patient location during evaluation: Phase 2 and Bedside  Patient participation: Able to fully participate in evaluation  Level of consciousness: awake and alert  Pain management: adequate  Airway patency: patent  Cardiovascular status: acceptable  Respiratory status: acceptable  Hydration status: acceptable  PONV: none     Anesthetic complications: None          Last vitals:  Vitals:    11/06/20 0712 11/06/20 0831   BP: (!) 151/85 118/64   Pulse: 95 81   Resp: 20    Temp: 36.7  C (98  F)    SpO2: 96% 95%         Electronically Signed By: CHETNA Michaels CRNA  November 6, 2020  8:48 AM

## 2020-11-22 ENCOUNTER — HEALTH MAINTENANCE LETTER (OUTPATIENT)
Age: 60
End: 2020-11-22

## 2020-12-17 LAB
ALBUMIN URINE MG/G CR: 58.65 MG/G CREATININE
ALBUMIN URINE MG/SPEC: NORMAL
CHOLEST SERPL-MCNC: 127 MG/DL (ref 0–200)
CREAT SERPL-MCNC: 0.88 MG/DL (ref 0.67–1.17)
CREATININE URINE: NORMAL
GFR SERPL CREATININE-BSD FRML MDRD: >90 ML/MIN/1.73M2
HBA1C MFR BLD: 7.6 % (ref 0–5.6)
HDLC SERPL-MCNC: 34 MG/DL
LDLC SERPL CALC-MCNC: 81 MG/DL (ref 0–129)
NONHDLC SERPL-MCNC: NORMAL MG/DL
TRIGL SERPL-MCNC: 62 MG/DL

## 2020-12-31 ENCOUNTER — TRANSFERRED RECORDS (OUTPATIENT)
Dept: MULTI SPECIALTY CLINIC | Facility: CLINIC | Age: 60
End: 2020-12-31

## 2020-12-31 LAB
RETINOPATHY: NEGATIVE
RETINOPATHY: NORMAL

## 2021-01-04 ENCOUNTER — OFFICE VISIT (OUTPATIENT)
Dept: FAMILY MEDICINE | Facility: CLINIC | Age: 61
End: 2021-01-04
Payer: COMMERCIAL

## 2021-01-04 VITALS
BODY MASS INDEX: 31.44 KG/M2 | TEMPERATURE: 97.4 F | HEART RATE: 93 BPM | OXYGEN SATURATION: 97 % | DIASTOLIC BLOOD PRESSURE: 68 MMHG | RESPIRATION RATE: 16 BRPM | SYSTOLIC BLOOD PRESSURE: 154 MMHG | HEIGHT: 74 IN | WEIGHT: 245 LBS

## 2021-01-04 DIAGNOSIS — I10 ESSENTIAL HYPERTENSION: ICD-10-CM

## 2021-01-04 DIAGNOSIS — Z72.0 TOBACCO ABUSE: ICD-10-CM

## 2021-01-04 DIAGNOSIS — E78.5 HYPERLIPIDEMIA LDL GOAL <100: ICD-10-CM

## 2021-01-04 DIAGNOSIS — R80.9 MICROALBUMINURIA: ICD-10-CM

## 2021-01-04 DIAGNOSIS — E11.42 TYPE 2 DIABETES MELLITUS WITH DIABETIC POLYNEUROPATHY, WITHOUT LONG-TERM CURRENT USE OF INSULIN (H): Primary | ICD-10-CM

## 2021-01-04 DIAGNOSIS — R94.31 ABNORMAL ELECTROCARDIOGRAM: ICD-10-CM

## 2021-01-04 PROCEDURE — 99214 OFFICE O/P EST MOD 30 MIN: CPT | Performed by: FAMILY MEDICINE

## 2021-01-04 PROCEDURE — 93000 ELECTROCARDIOGRAM COMPLETE: CPT | Performed by: FAMILY MEDICINE

## 2021-01-04 RX ORDER — ATORVASTATIN CALCIUM 40 MG/1
40 TABLET, FILM COATED ORAL DAILY
Qty: 90 TABLET | Refills: 3 | Status: SHIPPED | OUTPATIENT
Start: 2021-01-04 | End: 2021-10-20

## 2021-01-04 RX ORDER — LISINOPRIL 30 MG/1
30 TABLET ORAL DAILY
Qty: 90 TABLET | Refills: 1 | Status: SHIPPED | OUTPATIENT
Start: 2021-01-04 | End: 2021-09-17

## 2021-01-04 RX ORDER — LIRAGLUTIDE 6 MG/ML
INJECTION SUBCUTANEOUS
Qty: 6 ML | Refills: 3 | Status: SHIPPED | OUTPATIENT
Start: 2021-01-04 | End: 2021-04-19 | Stop reason: DRUGHIGH

## 2021-01-04 ASSESSMENT — PAIN SCALES - GENERAL: PAINLEVEL: NO PAIN (0)

## 2021-01-04 ASSESSMENT — MIFFLIN-ST. JEOR: SCORE: 2001.06

## 2021-01-04 NOTE — PATIENT INSTRUCTIONS
Start Victoza 0.6 mg daily injected for a week, then increase to 1.2 mg daily    See diabetic ed    Continue lantus 15 units    Stop the Amaryl    Continue Metformin 1000 mg twice daily with meals    Schedule stress testing in [place] [phone]    Nurse blood pressure check in 2-3 weeks - nurse only.  If still >140/90 we'll increase your lisinopril dose.         Our Clinic hours are:  Mondays    7:20 am - 7 pm  Tues -  Fri  7:20 am - 5 pm    Clinic Phone: [phone]    The clinic lab opens at 7:30 am Mon - Fri and appointments are required.    [facility]  Ph. [phone]  Monday  8 am - 7pm  Tues - Fri 8 am - 5:30 pm

## 2021-01-04 NOTE — PROGRESS NOTES
Assessment & Plan     Hyperlipidemia LDL goal <100   continue atorvastatin  - atorvastatin (LIPITOR) 40 MG tablet; Take 1 tablet (40 mg) by mouth daily    Type 2 diabetes mellitus with diabetic polyneuropathy, without long-term current use of insulin (H)  Recently completed GRADE trial through [facility]   Discontinue amaryl  Continue metformin 1000 mg twice daily  Continue lantus 15 units daily  Add victoza and gradually increase  Diabetic educator follow up is needed - patient agrees to this - recommend he bring his meter for downloading.    Motivated patient - may be a good candidate for CGM as well.  Recommend smoking cessation  - liraglutide (VICTOZA) 18 MG/3ML solution; Inject 0.6 mg Subcutaneous daily for 7 days, THEN 1.2 mg daily for 21 days.  - AMBULATORY ADULT DIABETES EDUCATOR REFERRAL; Future  - metFORMIN (GLUCOPHAGE) 500 MG tablet; Take 2 tablets (1,000 mg) by mouth 2 times daily (with meals)    Essential hypertension  Control had been good.    Recommend he return for RN blood pressure check in 2-3 weeks  Recommend he get a monitor for home.  Goal blood pressure <130/80    Microalbuminuria     - lisinopril (ZESTRIL) 30 MG tablet; Take 1 tablet (30 mg) by mouth daily    Abnormal electrocardiogram  Given diabetes, change in EKG, smoking and +FHx, will further evaluate with stress nuclear imaging, patient is very high risk.   - CARDIAC NUC CALE STRESS TEST NL; Future  - EKG 12-lead complete w/read - Clinics    Tobacco abuse  Recommended cessation    Review of external notes as documented above   Review of the result(s) of each unique test - EKG from GRADE study        Patient Instructions     Start Victoza 0.6 mg daily injected for a week, then increase to 1.2 mg daily    See diabetic ed    Continue lantus 15 units    Stop the Amaryl    Continue Metformin 1000 mg twice daily with meals    Schedule stress testing in [place] [phone]    Nurse blood pressure check in 2-3 weeks - nurse only.  If still >140/90  "we'll increase your lisinopril dose.           BMI:   Estimated body mass index is 31.46 kg/m  as calculated from the following:    Height as of this encounter: 1.88 m (6' 2\").    Weight as of this encounter: 111.1 kg (245 lb).    No follow-ups on file.    Leia Isabel MD  [facility]     Chin Jade is a 60 year old male who presents to clinic today for the following health issues   HPI       Diabetes Follow-up    How often are you checking your blood sugar? One time daily  What time of day are you checking your blood sugars (select all that apply)?  Before meals  Have you had any blood sugars above 200?  No  Have you had any blood sugars below 70?  Yes     What symptoms do you notice when your blood sugar is low?  Shaky, Weak and Other: sweaty    What concerns do you have today about your diabetes? None     Do you have any of these symptoms? (Select all that apply)  Numbness in feet and Burning in feet    Have you had a diabetic eye exam in the last 12 months? Yes- Date of last eye exam: 2020,  Location: [facility]    Patient was part of the GRADE study at the [facility] and that has ended.  His HgbA1c is poorly controlled. He reports that 3 months ago lantus 15 units was initiated.  He was also on 1000 mg twice daily of metformin IR and Amaryl 4 mg twice daily.      He would like to change medication now that he is no longer part of the study.  There is a strong family history of heart disease - father and brother.  Brother  recently of heart disease.     He continues to smoke and recognizes that as a risk factor, is closer to wanting to quit.       Hyperlipidemia Follow-Up      Are you regularly taking any medication or supplement to lower your cholesterol?   Yes- lipitor    Are you having muscle aches or other side effects that you think could be caused by your cholesterol lowering medication?  No    Hypertension Follow-up      Do you check your blood pressure " "regularly outside of the clinic? No     Are you following a low salt diet? No    Are your blood pressures ever more than 140 on the top number (systolic) OR more   than 90 on the bottom number (diastolic), for example 140/90? No     Last blood pressures recorded mid December from GRADE study were normal.    He also brings in an EKG showing \"old infarct\".  There was apparently change from last year to this year in his EKG which makes him nervous. He denies cardiac symptoms.  Exercise tolerance is good.    Reviewed EKG from [facility] showing Q's in III/AVF and right bundle branch block.   Reviewed EKG from 2013 which did have the Qs in inferior leads but no bundle branch block.      BP Readings from Last 2 Encounters:   01/04/21 (!) 152/76   11/06/20 129/77     Hemoglobin A1C (%)   Date Value   12/16/2020 7.6 (A)   09/16/2020 7.8 (A)     LDL Cholesterol Calculated (mg/dL)   Date Value   12/16/2020 81   03/30/2020 64         How many servings of fruits and vegetables do you eat daily?  4 or more    On average, how many sweetened beverages do you drink each day (Examples: soda, juice, sweet tea, etc.  Do NOT count diet or artificially sweetened beverages)?   0    How many days per week do you exercise enough to make your heart beat faster? 3 or less    How many minutes a day do you exercise enough to make your heart beat faster? 9 or less    How many days per week do you miss taking your medication? 0     Review of Systems   Constitutional, HEENT, cardiovascular, pulmonary, gi and gu systems are negative, except as otherwise noted.      Objective    BP (!) 152/76   Pulse 93   Temp 97.4  F (36.3  C) (Tympanic)   Resp 16   Ht 1.88 m (6' 2\")   Wt 111.1 kg (245 lb)   SpO2 97%   BMI 31.46 kg/m    Body mass index is 31.46 kg/m .  Physical Exam   GENERAL: healthy, alert and no distress  NECK: no adenopathy, no asymmetry, masses, or scars and thyroid normal to palpation  RESP: lungs clear to auscultation - no rales, rhonchi " or wheezes  CV: regular rate and rhythm, normal S1 S2, no S3 or S4, no murmur, click or rub, no peripheral edema and peripheral pulses strong  ABDOMEN: soft, nontender, no hepatosplenomegaly, no masses and bowel sounds normal  MS: no gross musculoskeletal defects noted, no edema    EKG - Reviewed and interpreted by me appears normal, NSR, Right Bundle Branch Block, Q waves in III/AFV, represents a change from previous EKG from 2013

## 2021-01-05 PROBLEM — I10 ESSENTIAL HYPERTENSION: Status: ACTIVE | Noted: 2021-01-05

## 2021-01-07 ENCOUNTER — TELEPHONE (OUTPATIENT)
Dept: FAMILY MEDICINE | Facility: CLINIC | Age: 61
End: 2021-01-07

## 2021-01-07 DIAGNOSIS — Z72.0 TOBACCO ABUSE: ICD-10-CM

## 2021-01-07 DIAGNOSIS — R94.31 ABNORMAL ELECTROCARDIOGRAM: Primary | ICD-10-CM

## 2021-01-07 DIAGNOSIS — E11.42 TYPE 2 DIABETES MELLITUS WITH DIABETIC POLYNEUROPATHY, WITHOUT LONG-TERM CURRENT USE OF INSULIN (H): ICD-10-CM

## 2021-01-07 NOTE — TELEPHONE ENCOUNTER
Call from Cardiology asking if the order pended below is what should be ordered instead?    Advise.  Kpavelrn

## 2021-01-13 ENCOUNTER — HOSPITAL ENCOUNTER (OUTPATIENT)
Dept: NUCLEAR MEDICINE | Facility: CLINIC | Age: 61
Setting detail: NUCLEAR MEDICINE
End: 2021-01-13
Attending: FAMILY MEDICINE
Payer: COMMERCIAL

## 2021-01-13 ENCOUNTER — HOSPITAL ENCOUNTER (OUTPATIENT)
Dept: CARDIOLOGY | Facility: CLINIC | Age: 61
End: 2021-01-13
Attending: FAMILY MEDICINE
Payer: COMMERCIAL

## 2021-01-13 VITALS — BODY MASS INDEX: 31.44 KG/M2 | WEIGHT: 245 LBS | HEIGHT: 74 IN

## 2021-01-13 DIAGNOSIS — R94.31 ABNORMAL ELECTROCARDIOGRAM: ICD-10-CM

## 2021-01-13 DIAGNOSIS — E11.42 TYPE 2 DIABETES MELLITUS WITH DIABETIC POLYNEUROPATHY, WITHOUT LONG-TERM CURRENT USE OF INSULIN (H): ICD-10-CM

## 2021-01-13 DIAGNOSIS — Z72.0 TOBACCO ABUSE: ICD-10-CM

## 2021-01-13 LAB
CV STRESS MAX HR HE: 139
RATE PRESSURE PRODUCT: NORMAL
STRESS ANGINA INDEX: 0
STRESS ECHO BASELINE DIASTOLIC HE: 78
STRESS ECHO BASELINE HR: 71
STRESS ECHO BASELINE SYSTOLIC BP: 132
STRESS ECHO CALCULATED PERCENT HR: 86 %
STRESS ECHO LAST STRESS DIASTOLIC BP: 80
STRESS ECHO LAST STRESS SYSTOLIC BP: 200
STRESS ECHO POST ESTIMATED WORKLOAD: 10.4 METS
STRESS ECHO POST EXERCISE DUR MIN: 10 MIN
STRESS ECHO POST EXERCISE DUR SEC: 11 SEC
STRESS ECHO TARGET HR: 162

## 2021-01-13 PROCEDURE — 93018 CV STRESS TEST I&R ONLY: CPT | Performed by: INTERNAL MEDICINE

## 2021-01-13 PROCEDURE — 78452 HT MUSCLE IMAGE SPECT MULT: CPT | Mod: 26 | Performed by: INTERNAL MEDICINE

## 2021-01-13 PROCEDURE — 343N000001 HC RX 343: Performed by: FAMILY MEDICINE

## 2021-01-13 PROCEDURE — 93017 CV STRESS TEST TRACING ONLY: CPT

## 2021-01-13 PROCEDURE — 78452 HT MUSCLE IMAGE SPECT MULT: CPT

## 2021-01-13 PROCEDURE — A9502 TC99M TETROFOSMIN: HCPCS | Performed by: FAMILY MEDICINE

## 2021-01-13 PROCEDURE — 93016 CV STRESS TEST SUPVJ ONLY: CPT | Performed by: INTERNAL MEDICINE

## 2021-01-13 RX ADMIN — TETROFOSMIN 36.1 MCI.: 1.38 INJECTION, POWDER, LYOPHILIZED, FOR SOLUTION INTRAVENOUS at 14:05

## 2021-01-13 RX ADMIN — TETROFOSMIN 12.8 MCI.: 1.38 INJECTION, POWDER, LYOPHILIZED, FOR SOLUTION INTRAVENOUS at 12:30

## 2021-01-13 ASSESSMENT — MIFFLIN-ST. JEOR: SCORE: 2001.06

## 2021-01-25 ENCOUNTER — ALLIED HEALTH/NURSE VISIT (OUTPATIENT)
Dept: EDUCATION SERVICES | Facility: CLINIC | Age: 61
End: 2021-01-25
Attending: FAMILY MEDICINE
Payer: COMMERCIAL

## 2021-01-25 DIAGNOSIS — E11.42 TYPE 2 DIABETES MELLITUS WITH DIABETIC POLYNEUROPATHY, WITHOUT LONG-TERM CURRENT USE OF INSULIN (H): ICD-10-CM

## 2021-01-25 DIAGNOSIS — E11.9 DIABETES MELLITUS WITHOUT COMPLICATION (H): Primary | ICD-10-CM

## 2021-01-25 PROCEDURE — G0108 DIAB MANAGE TRN  PER INDIV: HCPCS

## 2021-01-25 PROCEDURE — 95250 CONT GLUC MNTR PHYS/QHP EQP: CPT

## 2021-01-25 RX ORDER — PROCHLORPERAZINE 25 MG/1
SUPPOSITORY RECTAL
Qty: 1 EACH | Refills: 3 | Status: SHIPPED | OUTPATIENT
Start: 2021-01-25 | End: 2022-01-10

## 2021-01-25 RX ORDER — PROCHLORPERAZINE 25 MG/1
SUPPOSITORY RECTAL
Qty: 1 EACH | Refills: 0 | Status: SHIPPED | OUTPATIENT
Start: 2021-01-25 | End: 2024-03-06

## 2021-01-25 RX ORDER — PROCHLORPERAZINE 25 MG/1
SUPPOSITORY RECTAL
Qty: 3 EACH | Refills: 11 | Status: SHIPPED | OUTPATIENT
Start: 2021-01-25 | End: 2022-01-10

## 2021-01-25 NOTE — LETTER
"    1/25/2021         RE: Chin Jade  [address]  [place] [address]        Dear Colleague,    Thank you for referring your patient, Chin Jade, to the [facility]. Please see a copy of my visit note below.    Diabetes Self-Management Education & Support    Presents for: Individual review    SUBJECTIVE/OBJECTIVE:  Presents for: Individual review  Accompanied by: Self  Diabetes education in the past 24mo: Yes(was in the grade study)  Focus of Visit: Monitoring, Taking Medication, Healthy Eating, CGM  Type of CGM visit: Professional CGM  Diabetes type: Type 2  Date of diagnosis: 10 yrs  Disease course: Getting harder to manage  Transportation concerns: No  Difficulty affording diabetes medication?: No  Difficulty affording diabetes testing supplies?: No  Other concerns:: None  Cultural Influences/Ethnic Background:  American      Diabetes Symptoms & Complications:  Fatigue: Yes  Neuropathy: Yes  Polydipsia: No  Polyphagia: No  Polyuria: No  Visual change: No  Symptom course: Stable  Weight trend: Stable  Complications assessed today?: Yes  Autonomic neuropathy: No  CVA: No  Heart disease: No  Nephropathy: No  Peripheral neuropathy: Yes  Peripheral Vascular Disease: No  Retinopathy: No    Patient Problem List and Family Medical History reviewed for relevant medical history, current medical status, and diabetes risk factors.    Vitals:  There were no vitals taken for this visit.  Estimated body mass index is 31.46 kg/m  as calculated from the following:    Height as of 1/13/21: 1.88 m (6' 2\").    Weight as of 1/13/21: 111.1 kg (245 lb).   Last 3 BP:   BP Readings from Last 3 Encounters:   01/04/21 (!) 154/68   11/06/20 129/77   10/14/20 130/68       History   Smoking Status     Current Every Day Smoker     Packs/day: 0.50     Years: 30.00     Types: Cigarettes   Smokeless Tobacco     Former User       Labs:  Lab Results   Component Value Date    A1C 7.6 12/16/2020     Lab Results "   Component Value Date     03/30/2020     Lab Results   Component Value Date    LDL 81 12/16/2020     HDL Cholesterol   Date Value Ref Range Status   12/16/2020 34 >40 mg/dL Final   ]  GFR Estimate   Date Value Ref Range Status   12/16/2020 >90 >60 ml/min/1.73m2 Final     GFR Estimate If Black   Date Value Ref Range Status   12/16/2020 >90 >60 ml/min/1.73m2 Final     Lab Results   Component Value Date    CR 0.88 12/16/2020     No results found for: MICROALBUMIN    Healthy Eating:  Healthy Eating Assessed Today: Yes  Cultural/Worship diet restrictions?: No  Meal planning/habits: Carb counting, Smaller portions, Plate planning method  Meals include: Breakfast, Lunch, Dinner, Evening Snack  Breakfast: COFFEE AND carnation instant breakfast  Lunch: Process foods for lunch at work  Dinner: meat , potatoes, veggies, and salads.  Snacks: granola bar, fruit bar and non fat yogurt  Beverages: Coffee, Water  Has patient met with a dietitian in the past?: Yes    Being Active:  Being Active Assessed Today: Yes  Exercise:: Currently not exercising  Barrier to exercise: Other(covid)    Monitoring:  Monitoring Assessed Today: Yes  Did patient bring glucose meter to appointment? : Yes  Blood Glucose Meter: Other  Times checking blood sugar at home (number): 3  Times checking blood sugar at home (per): Day  Blood glucose trend: Decreasing    Uses the livongo, he was 77% in goal , ave 165.    Taking Medications:  Diabetes Medication(s)     Biguanides       metFORMIN (GLUCOPHAGE) 500 MG tablet    Take 2 tablets (1,000 mg) by mouth 2 times daily (with meals)    Insulin       insulin glargine (LANTUS SOLOSTAR) 100 UNIT/ML pen    Inject 15 Units Subcutaneous At Bedtime    Incretin Mimetic Agents (GLP-1 Receptor Agonists)       liraglutide (VICTOZA) 18 MG/3ML solution    Inject 0.6 mg Subcutaneous daily for 7 days, THEN 1.2 mg daily for 21 days.          Taking Medication Assessed Today: Yes  Current Treatments: Insulin  Injections, Non-insulin Injectables, Oral Medication (taken by mouth)  Dose schedule: Pre-breakfast, Pre-dinner  Given by: Patient  Injection/Infusion sites: Abdomen  Problems taking diabetes medications regularly?: No  Diabetes medication side effects?: No    Problem Solving:                 Reducing Risks:  Has dilated eye exam at least once a year?: Yes  Sees dentist every 6 months?: Yes  Feet checked by healthcare provider in the last year?: Yes    Healthy Coping:  Healthy Coping Assessed Today: Yes  Emotional response to diabetes: Ready to learn  Informal Support system:: Family, Spouse  Stage of change: ACTION (Actively working towards change)  Patient Activation Measure Survey Score:  MIGNON Score (Last Two) 11/12/2013   MIGNON Raw Score 39   Activation Score 56.4   MIGNON Level 3       Diabetes knowledge and skills assessment:   Patient is knowledgeable in diabetes management concepts related to: Healthy Eating, Being Active, Monitoring, Taking Medication, Problem Solving, Reducing Risks and Healthy Coping    Patient needs further education on the following diabetes management concepts: Healthy Eating, Monitoring and Taking Medication    Based on learning assessment above, most appropriate setting for further diabetes education would be: Individual setting.      INTERVENTIONS:    Education provided today on:  AADE Self-Care Behaviors:  Diabetes Pathophysiology  Healthy Eating: carbohydrate counting, consistency in amount, composition, and timing of food intake, weight reduction, heart healthy diet, portion control, plate planning method and label reading  Taking Medication: action of prescribed medication, drawing up, administering and storing injectable diabetes medications, proper site selection and rotation for injections, side effects of prescribed medications and when to take medications    Opportunities for ongoing education and support in diabetes-self management were discussed.    Pt verbalized understanding  of concepts discussed and recommendations provided today.       Education Materials Provided:  [facility] Understanding Diabetes Booklet, Living Healthy with Diabetes, Carbohydrate Counting and My Plate Planner      ASSESSMENT:  Chin comes today after being with the Grade study, was placed on Amaryl and Metformin an his BG were rising,  The study is completed and he is on insulin.  Was taken off and placed on Victoza . Placed a CGM on today and will recheck him in 2 wks        Patient's most recent   Lab Results   Component Value Date    A1C 7.6 12/16/2020    is not meeting goal of <7.0    PLAN  See Patient Instructions for co-developed, patient-stated behavior change goals.  AVS printed and provided to patient today. See Follow-Up section for recommended follow-up.    Trudy Garrido RN/[facility] Diabetes Educator    Time Spent: 60 minutes  Encounter Type: Individual    Any diabetes medication dose changes were made via the CDE Protocol and Collaborative Practice Agreement with the patient's primary care provider. A copy of this encounter was shared with the provider.

## 2021-01-25 NOTE — PROGRESS NOTES
"Diabetes Self-Management Education & Support    Presents for: Individual review    SUBJECTIVE/OBJECTIVE:  Presents for: Individual review  Accompanied by: Self  Diabetes education in the past 24mo: Yes(was in the grade study)  Focus of Visit: Monitoring, Taking Medication, Healthy Eating, CGM  Type of CGM visit: Professional CGM  Diabetes type: Type 2  Date of diagnosis: 10 yrs  Disease course: Getting harder to manage  Transportation concerns: No  Difficulty affording diabetes medication?: No  Difficulty affording diabetes testing supplies?: No  Other concerns:: None  Cultural Influences/Ethnic Background:  American      Diabetes Symptoms & Complications:  Fatigue: Yes  Neuropathy: Yes  Polydipsia: No  Polyphagia: No  Polyuria: No  Visual change: No  Symptom course: Stable  Weight trend: Stable  Complications assessed today?: Yes  Autonomic neuropathy: No  CVA: No  Heart disease: No  Nephropathy: No  Peripheral neuropathy: Yes  Peripheral Vascular Disease: No  Retinopathy: No    Patient Problem List and Family Medical History reviewed for relevant medical history, current medical status, and diabetes risk factors.    Vitals:  There were no vitals taken for this visit.  Estimated body mass index is 31.46 kg/m  as calculated from the following:    Height as of 1/13/21: 1.88 m (6' 2\").    Weight as of 1/13/21: 111.1 kg (245 lb).   Last 3 BP:   BP Readings from Last 3 Encounters:   01/04/21 (!) 154/68   11/06/20 129/77   10/14/20 130/68       History   Smoking Status     Current Every Day Smoker     Packs/day: 0.50     Years: 30.00     Types: Cigarettes   Smokeless Tobacco     Former User       Labs:  Lab Results   Component Value Date    A1C 7.6 12/16/2020     Lab Results   Component Value Date     03/30/2020     Lab Results   Component Value Date    LDL 81 12/16/2020     HDL Cholesterol   Date Value Ref Range Status   12/16/2020 34 >40 mg/dL Final   ]  GFR Estimate   Date Value Ref Range Status   12/16/2020 " >90 >60 ml/min/1.73m2 Final     GFR Estimate If Black   Date Value Ref Range Status   12/16/2020 >90 >60 ml/min/1.73m2 Final     Lab Results   Component Value Date    CR 0.88 12/16/2020     No results found for: MICROALBUMIN    Healthy Eating:  Healthy Eating Assessed Today: Yes  Cultural/Church diet restrictions?: No  Meal planning/habits: Carb counting, Smaller portions, Plate planning method  Meals include: Breakfast, Lunch, Dinner, Evening Snack  Breakfast: COFFEE AND carnation instant breakfast  Lunch: Process foods for lunch at work  Dinner: meat , potatoes, veggies, and salads.  Snacks: granola bar, fruit bar and non fat yogurt  Beverages: Coffee, Water  Has patient met with a dietitian in the past?: Yes    Being Active:  Being Active Assessed Today: Yes  Exercise:: Currently not exercising  Barrier to exercise: Other(covid)    Monitoring:  Monitoring Assessed Today: Yes  Did patient bring glucose meter to appointment? : Yes  Blood Glucose Meter: Other  Times checking blood sugar at home (number): 3  Times checking blood sugar at home (per): Day  Blood glucose trend: Decreasing    Uses the livongo, he was 77% in goal , ave 165.    Taking Medications:  Diabetes Medication(s)     Biguanides       metFORMIN (GLUCOPHAGE) 500 MG tablet    Take 2 tablets (1,000 mg) by mouth 2 times daily (with meals)    Insulin       insulin glargine (LANTUS SOLOSTAR) 100 UNIT/ML pen    Inject 15 Units Subcutaneous At Bedtime    Incretin Mimetic Agents (GLP-1 Receptor Agonists)       liraglutide (VICTOZA) 18 MG/3ML solution    Inject 0.6 mg Subcutaneous daily for 7 days, THEN 1.2 mg daily for 21 days.          Taking Medication Assessed Today: Yes  Current Treatments: Insulin Injections, Non-insulin Injectables, Oral Medication (taken by mouth)  Dose schedule: Pre-breakfast, Pre-dinner  Given by: Patient  Injection/Infusion sites: Abdomen  Problems taking diabetes medications regularly?: No  Diabetes medication side effects?:  No    Problem Solving:                 Reducing Risks:  Has dilated eye exam at least once a year?: Yes  Sees dentist every 6 months?: Yes  Feet checked by healthcare provider in the last year?: Yes    Healthy Coping:  Healthy Coping Assessed Today: Yes  Emotional response to diabetes: Ready to learn  Informal Support system:: Family, Spouse  Stage of change: ACTION (Actively working towards change)  Patient Activation Measure Survey Score:  MIGNON Score (Last Two) 11/12/2013   MIGNON Raw Score 39   Activation Score 56.4   MIGNON Level 3       Diabetes knowledge and skills assessment:   Patient is knowledgeable in diabetes management concepts related to: Healthy Eating, Being Active, Monitoring, Taking Medication, Problem Solving, Reducing Risks and Healthy Coping    Patient needs further education on the following diabetes management concepts: Healthy Eating, Monitoring and Taking Medication    Based on learning assessment above, most appropriate setting for further diabetes education would be: Individual setting.      INTERVENTIONS:    Education provided today on:  AADE Self-Care Behaviors:  Diabetes Pathophysiology  Healthy Eating: carbohydrate counting, consistency in amount, composition, and timing of food intake, weight reduction, heart healthy diet, portion control, plate planning method and label reading  Taking Medication: action of prescribed medication, drawing up, administering and storing injectable diabetes medications, proper site selection and rotation for injections, side effects of prescribed medications and when to take medications    Opportunities for ongoing education and support in diabetes-self management were discussed.    Pt verbalized understanding of concepts discussed and recommendations provided today.       Education Materials Provided:  [facility] Understanding Diabetes Booklet, Living Healthy with Diabetes, Carbohydrate Counting and My Plate Planner      ASSESSMENT:  Chin comes today after  being with the Grade study, was placed on Amaryl and Metformin an his BG were rising,  The study is completed and he is on insulin.  Was taken off and placed on Victoza . Placed a CGM on today and will recheck him in 2 wks        Patient's most recent   Lab Results   Component Value Date    A1C 7.6 12/16/2020    is not meeting goal of <7.0    PLAN  See Patient Instructions for co-developed, patient-stated behavior change goals.  AVS printed and provided to patient today. See Follow-Up section for recommended follow-up.    Trudy Garrido RN/[facility] Diabetes Educator    Time Spent: 60 minutes  Encounter Type: Individual    Any diabetes medication dose changes were made via the CDE Protocol and Collaborative Practice Agreement with the patient's primary care provider. A copy of this encounter was shared with the provider.

## 2021-01-25 NOTE — PATIENT INSTRUCTIONS
Diabetes Support Resources:  1. Continue to check every morning ()  2. Check 2 hrs after a meal (<180)  3. Follow the plate planning for meals     Bring blood glucose meter and logbook with you to all doctor and follow-up appointments.    Diabetes Education Telephone Visit Follow-up:    We realize your time is valuable and your health is important! We offer a convenient Telephone Visit follow up! It s a quick way to check in for a medication dose adjustment without having to come back to clinic as soon.    Telephone Visits are often covered by insurance. Please check with your insurance plan to see if this type of visit is covered. If not, the cost is less expensive than an office visit:      Up to 10 minutes (Code 08626): $30    11-20 minutes (Code 16466): $59    More than 20 minutes (Code 46141): $85    Talk with your Diabetes Educator if you want to learn more.      [facility]:  For Your Diabetes Education and Nutrition Appointments Call:  [phone]   For Diabetes Education or Nutrition Related Questions:   Phone: [phone]  Send OpenStudy Message   If you need a medication refill please contact your pharmacy. Please allow 3 business days for your refills to be completed.

## 2021-01-28 DIAGNOSIS — E11.42 TYPE 2 DIABETES MELLITUS WITH DIABETIC POLYNEUROPATHY, WITHOUT LONG-TERM CURRENT USE OF INSULIN (H): ICD-10-CM

## 2021-01-28 NOTE — TELEPHONE ENCOUNTER
"Requested Prescriptions   Pending Prescriptions Disp Refills     ASPIRIN LOW DOSE 81 MG EC tablet [Pharmacy Med Name: ASPIRIN LOW DOSE 81MG TBEC] 100 tablet 1     Sig: TAKE ONE TABLET BY MOUTH ONCE DAILY       Analgesics (Non-Narcotic Tylenol and ASA Only) Passed - 1/28/2021  5:02 AM        Passed - Recent (12 mo) or future (30 days) visit within the authorizing provider's specialty     Patient has had an office visit with the authorizing provider or a provider within the authorizing providers department within the previous 12 mos or has a future within next 30 days. See \"Patient Info\" tab in inbasket, or \"Choose Columns\" in Meds & Orders section of the refill encounter.              Passed - Patient is age 20 years or older     If ASA is flagged for ages under 20 years old. Forward to provider for confirmation Ryes Syndrome is not a concern.              Passed - Medication is active on med list             "

## 2021-02-01 RX ORDER — ASPIRIN 81 MG/1
TABLET, COATED ORAL
Qty: 100 TABLET | Refills: 2 | Status: SHIPPED | OUTPATIENT
Start: 2021-02-01 | End: 2021-12-20

## 2021-02-08 ENCOUNTER — ALLIED HEALTH/NURSE VISIT (OUTPATIENT)
Dept: EDUCATION SERVICES | Facility: CLINIC | Age: 61
End: 2021-02-08
Payer: COMMERCIAL

## 2021-02-08 DIAGNOSIS — E11.9 DIABETES MELLITUS WITHOUT COMPLICATION (H): Primary | ICD-10-CM

## 2021-02-08 PROCEDURE — G0108 DIAB MANAGE TRN  PER INDIV: HCPCS

## 2021-02-08 NOTE — PROGRESS NOTES
LibrePro Continuous Glucose Monitor Interpretation     Patient History:   1. Type of Diabetes: Type 2 diabetes  2. Duration of diabetes or year of diagnosis:   3. Current treatment regimen (include all diabetes medications, dose & dosing frequency/timing):   yes:     Diabetes Medication(s)     Biguanides       metFORMIN (GLUCOPHAGE) 500 MG tablet    Take 2 tablets (1,000 mg) by mouth 2 times daily (with meals)    Insulin       insulin glargine (LANTUS SOLOSTAR) 100 UNIT/ML pen    Inject 13 Units Subcutaneous At Bedtime    Incretin Mimetic Agents (GLP-1 Receptor Agonists)       liraglutide (VICTOZA) 18 MG/3ML solution    Inject 0.6 mg Subcutaneous daily for 7 days, THEN 1.2 mg daily for 21 days.        *Abbreviated insulin dose documentation key: Insulin Trade Name (vejwjhkhp-tbjap-hxlgmb-bedtime) - i.e. Humalog 5-5-5-0 (Humalog 5 units at breakfast, 5 units at lunch, and 5 units at dinner).  4. Most Recent A1c Result:    Lab Results   Component Value Date    A1C 7.6 2020     5. Indication/s for LibrePro study: Unexplained fluctuations in glucose values.                          Statistics:   1. Sensor worn for 14 days.  2. Glucose excursions:   Percent in target is: 84%  Percent above target is: 14+1%  Percent below target is: 1%  3. Estimated average glucose: 140 mg/dL                        Data evaluation:   1. Sensor modal day evaluation shows the followin. Consistent day-to-day patterns noted: pattern of daytime hyperglycemia  2. pattern of nocturnal hypoglycemia.  3. Low glucose events: 1    Patient's Logbook shows the following:   Carbohydrate counting is: accurate  Medication and/or insulin dosing is: inaccurate     Assessment and Plan:  Recommend decrease to insulin - Lantus from 15 units to 13 units and every 3 days of ave BG < 150 decrease insulin by 2 units until done.    May increase Victoza to 1.8 mg if needed in then next 3 wks.  Or may switch to Ozempic based on coverage.       Trudy  Radhika RN/MENDOZA  [place] Diabetes Educator    Time Spent: 30 minutes  Any diabetes medication dose changes were made via the CDE Protocol and Collaborative Practice Agreement with the patient's primary care provider. A copy of this encounter was shared with the provider.

## 2021-02-08 NOTE — PATIENT INSTRUCTIONS
Diabetes Support Resources:  1. Continue Victoza 1.2 mg daily, check out Ozempic weekly  2. May go to 1.8 mg daily  3. Metformin 500 mg take 2 pills twice daily  (metformin  mg take 4 pills daily)  4. Lantus 13 units tonight, if after 3 days ave. Is < 150, lower by 2 units, continue this every 3 days then stop Lantus  5. Send me a my chart message in 3 wks. To look at your numbers and tell me when you stopped Lantus     Bring blood glucose meter and logbook with you to all doctor and follow-up appointments.    Diabetes Education Telephone Visit Follow-up:    We realize your time is valuable and your health is important! We offer a convenient Telephone Visit follow up! It s a quick way to check in for a medication dose adjustment without having to come back to clinic as soon.    Telephone Visits are often covered by insurance. Please check with your insurance plan to see if this type of visit is covered. If not, the cost is less expensive than an office visit:      Up to 10 minutes (Code 81872): $30    11-20 minutes (Code 72106): $59    More than 20 minutes (Code 92398): $85    Talk with your Diabetes Educator if you want to learn more.      [facility]:  For Your Diabetes Education and Nutrition Appointments Call:  [phone]   For Diabetes Education or Nutrition Related Questions:   Phone: [phone]  Send Busportal Message   If you need a medication refill please contact your pharmacy. Please allow 3 business days for your refills to be completed.

## 2021-02-08 NOTE — LETTER
2/8/2021         RE: Chin Jade  [address]  [facility] [address]        Dear Dr. Isabel    Please see CDE progress note for continuous glucose monitoring (CGM) reports, assessment and recommendations.     As a provider, you can bill for a non face-to-face interpretation of the sensor report. If you would like to bill for this service, please create a 'Documentation Only' encounter noting your interpretation and assessment of CGM reports, your recommended plan, and bill for this CGM interpretation using code [id number].      Thank you for referring your patient, Chin Jade, to the [facility]. Please see a copy of my visit note below.    LibrePro Continuous Glucose Monitor Interpretation     Patient History:   1. Type of Diabetes: Type 2 diabetes  2. Duration of diabetes or year of diagnosis:   3. Current treatment regimen (include all diabetes medications, dose & dosing frequency/timing):   yes:     Diabetes Medication(s)     Biguanides       metFORMIN (GLUCOPHAGE) 500 MG tablet    Take 2 tablets (1,000 mg) by mouth 2 times daily (with meals)    Insulin       insulin glargine (LANTUS SOLOSTAR) 100 UNIT/ML pen    Inject 13 Units Subcutaneous At Bedtime    Incretin Mimetic Agents (GLP-1 Receptor Agonists)       liraglutide (VICTOZA) 18 MG/3ML solution    Inject 0.6 mg Subcutaneous daily for 7 days, THEN 1.2 mg daily for 21 days.        *Abbreviated insulin dose documentation key: Insulin Trade Name (ckvmlfjho-shkpc-abiqfu-bedtime) - i.e. Humalog 5-5-5-0 (Humalog 5 units at breakfast, 5 units at lunch, and 5 units at dinner).  4. Most Recent A1c Result:    Lab Results   Component Value Date    A1C 7.6 12/16/2020     5. Indication/s for LibrePro study: Unexplained fluctuations in glucose values.                          Statistics:   1. Sensor worn for 14 days.  2. Glucose excursions:   Percent in target is: 84%  Percent above target is: 14+1%  Percent below target is: 1%  3.  Estimated average glucose: 140 mg/dL                        Data evaluation:   1. Sensor modal day evaluation shows the followin. Consistent day-to-day patterns noted: pattern of daytime hyperglycemia  2. pattern of nocturnal hypoglycemia.  3. Low glucose events: 1    Patient's Logbook shows the following:   Carbohydrate counting is: accurate  Medication and/or insulin dosing is: inaccurate     Assessment and Plan:  Recommend decrease to insulin - Lantus from 15 units to 13 units and every 3 days of ave BG < 150 decrease insulin by 2 units until done.    May increase Victoza to 1.8 mg if needed in then next 3 wks.  Or may switch to Ozempic based on coverage.       Trudy Garrido RN/MENDOZA  [place] Diabetes Educator    Time Spent: 30 minutes  Any diabetes medication dose changes were made via the CDE Protocol and Collaborative Practice Agreement with the patient's primary care provider. A copy of this encounter was shared with the provider.

## 2021-03-18 ENCOUNTER — IMMUNIZATION (OUTPATIENT)
Dept: FAMILY MEDICINE | Facility: CLINIC | Age: 61
End: 2021-03-18
Payer: COMMERCIAL

## 2021-03-18 PROCEDURE — 91301 PR COVID VAC MODERNA 100 MCG/0.5 ML IM: CPT

## 2021-03-18 PROCEDURE — 0011A PR COVID VAC MODERNA 100 MCG/0.5 ML IM: CPT

## 2021-04-15 ENCOUNTER — IMMUNIZATION (OUTPATIENT)
Dept: FAMILY MEDICINE | Facility: CLINIC | Age: 61
End: 2021-04-15
Attending: FAMILY MEDICINE
Payer: COMMERCIAL

## 2021-04-15 PROCEDURE — 91301 PR COVID VAC MODERNA 100 MCG/0.5 ML IM: CPT

## 2021-04-15 PROCEDURE — 0012A PR COVID VAC MODERNA 100 MCG/0.5 ML IM: CPT

## 2021-04-19 ENCOUNTER — ALLIED HEALTH/NURSE VISIT (OUTPATIENT)
Dept: EDUCATION SERVICES | Facility: CLINIC | Age: 61
End: 2021-04-19
Payer: COMMERCIAL

## 2021-04-19 DIAGNOSIS — E11.42 TYPE 2 DIABETES MELLITUS WITH DIABETIC POLYNEUROPATHY, WITHOUT LONG-TERM CURRENT USE OF INSULIN (H): Primary | ICD-10-CM

## 2021-04-19 DIAGNOSIS — E11.9 DIABETES MELLITUS WITHOUT COMPLICATION (H): ICD-10-CM

## 2021-04-19 PROCEDURE — G0108 DIAB MANAGE TRN  PER INDIV: HCPCS

## 2021-04-19 RX ORDER — LIRAGLUTIDE 6 MG/ML
1.8 INJECTION SUBCUTANEOUS DAILY
Qty: 9 ML | Refills: 3 | Status: SHIPPED | OUTPATIENT
Start: 2021-04-19 | End: 2021-10-20

## 2021-04-19 RX ORDER — METFORMIN HCL 500 MG
1000 TABLET, EXTENDED RELEASE 24 HR ORAL 2 TIMES DAILY WITH MEALS
Qty: 360 TABLET | Refills: 1 | Status: SHIPPED | OUTPATIENT
Start: 2021-04-19 | End: 2021-10-18

## 2021-04-19 NOTE — PROGRESS NOTES
"Diabetes Self-Management Education & Support    Presents for: Follow-up    SUBJECTIVE/OBJECTIVE:  Presents for: Follow-up  Accompanied by: Self  Diabetes education in the past 24mo: Yes(was in the grade study)  Focus of Visit: Monitoring, Taking Medication, Healthy Eating, CGM  Type of CGM visit: Professional CGM  Diabetes type: Type 2  Date of diagnosis: 10 yrs  Disease course: Improving  Transportation concerns: No  Difficulty affording diabetes medication?: No  Difficulty affording diabetes testing supplies?: No  Other concerns:: None  Cultural Influences/Ethnic Background:  American      Diabetes Symptoms & Complications:  Fatigue: Sometimes  Neuropathy: Yes  Polydipsia: No  Polyphagia: No  Polyuria: No  Visual change: No  Symptom course: Stable  Weight trend: Stable  Complications assessed today?: Yes  Autonomic neuropathy: No  CVA: No  Heart disease: No  Nephropathy: No  Peripheral neuropathy: Yes  Peripheral Vascular Disease: No  Retinopathy: No    Patient Problem List and Family Medical History reviewed for relevant medical history, current medical status, and diabetes risk factors.    Vitals:  There were no vitals taken for this visit.  Estimated body mass index is 31.46 kg/m  as calculated from the following:    Height as of 1/13/21: 1.88 m (6' 2\").    Weight as of 1/13/21: 111.1 kg (245 lb).   Last 3 BP:   BP Readings from Last 3 Encounters:   01/04/21 (!) 154/68   11/06/20 129/77   10/14/20 130/68       History   Smoking Status     Current Every Day Smoker     Packs/day: 0.50     Years: 30.00     Types: Cigarettes   Smokeless Tobacco     Former User       Labs:  Lab Results   Component Value Date    A1C 7.6 12/16/2020     Lab Results   Component Value Date     03/30/2020     Lab Results   Component Value Date    LDL 81 12/16/2020     HDL Cholesterol   Date Value Ref Range Status   12/16/2020 34 >40 mg/dL Final   ]  GFR Estimate   Date Value Ref Range Status   12/16/2020 >90 >60 ml/min/1.73m2 Final "     GFR Estimate If Black   Date Value Ref Range Status   12/16/2020 >90 >60 ml/min/1.73m2 Final     Lab Results   Component Value Date    CR 0.88 12/16/2020     No results found for: MICROALBUMIN    Healthy Eating:  Healthy Eating Assessed Today: Yes  Cultural/Uatsdin diet restrictions?: No  Meal planning/habits: Carb counting, Smaller portions, Plate planning method  Meals include: Breakfast, Lunch, Dinner, Evening Snack  Breakfast: COFFEE AND carnation instant breakfast  Lunch: Process foods for lunch at work  Dinner: meat , potatoes, veggies, and salads.  Snacks: granola bar, fruit bar and non fat yogurt  Beverages: Coffee, Water  Has patient met with a dietitian in the past?: Yes    Being Active:  Being Active Assessed Today: Yes  Exercise:: Currently not exercising  Barrier to exercise: Other(covid)    Monitoring:  Monitoring Assessed Today: Yes  Did patient bring glucose meter to appointment? : Yes  Blood Glucose Meter: Other  Times checking blood sugar at home (number): 3  Times checking blood sugar at home (per): Day  Blood glucose trend: Decreasing                      Maderna COVID injection given on April 15 and his BG went very high!!!!!!!!!!!!!!!!!                  Taking Medications:  Diabetes Medication(s)     Biguanides       metFORMIN (GLUCOPHAGE-XR) 500 MG 24 hr tablet    Take 2 tablets (1,000 mg) by mouth 2 times daily (with meals)     metFORMIN (GLUCOPHAGE) 500 MG tablet    Take 2 tablets (1,000 mg) by mouth 2 times daily (with meals)    Incretin Mimetic Agents (GLP-1 Receptor Agonists)       liraglutide (VICTOZA) 18 MG/3ML solution    Inject 1.8 mg Subcutaneous daily          Taking Medication Assessed Today: Yes  Current Treatments: Non-insulin Injectables, Oral Medication (taken by mouth)  Dose schedule: Pre-breakfast, Pre-dinner  Given by: Patient  Injection/Infusion sites: Abdomen  Problems taking diabetes medications regularly?: No  Diabetes medication side effects?: No    Problem  Solving:  Problem Solving Assessed Today: No              Reducing Risks:  Diabetes Risks: Age over 45 years  CAD Risks: Diabetes Mellitus, Male sex  Has dilated eye exam at least once a year?: Yes  Sees dentist every 6 months?: Yes  Feet checked by healthcare provider in the last year?: Yes    Healthy Coping:  Healthy Coping Assessed Today: Yes  Emotional response to diabetes: Ready to learn  Informal Support system:: Family, Spouse  Stage of change: ACTION (Actively working towards change)  Patient Activation Measure Survey Score:  MIGNON Score (Last Two) 11/12/2013   MIGNON Raw Score 39   Activation Score 56.4   MIGNON Level 3       Diabetes knowledge and skills assessment:   Patient is knowledgeable in diabetes management concepts related to: Monitoring and Taking Medication    Patient needs further education on the following diabetes management concepts: Monitoring and Taking Medication    Based on learning assessment above, most appropriate setting for further diabetes education would be: Individual setting.      INTERVENTIONS:    Education provided today on:  AADE Self-Care Behaviors:  Taking Medication: action of prescribed medication, drawing up, administering and storing injectable diabetes medications, proper site selection and rotation for injections, side effects of prescribed medications and when to take medications    Opportunities for ongoing education and support in diabetes-self management were discussed.    Pt verbalized understanding of concepts discussed and recommendations provided today.       Education Materials Provided:  No new materials provided today      ASSESSMENT:    Chin is off of insulin now, will change out his Metformin to  mg to help cover the weekends when he cannot take the IR 12 hrs apart.,  Will increase Victoza to 1.8 mg daily and then next RX may look into Ozempic or Trulicity or even jardiance if he is not meeting his goals.  He will be in [place] in 2 wks and once back will  send a message to check his Dexcom again.  He is wearing this on his arms now as well        Patient's most recent   Lab Results   Component Value Date    A1C 7.6 12/16/2020    is not meeting goal of <7.0    PLAN  See Patient Instructions for co-developed, patient-stated behavior change goals.  AVS printed and provided to patient today. See Follow-Up section for recommended follow-up.    Trudy Garrido RN/MENDOZA  [place] Diabetes Educator    Time Spent: 60 minutes  Encounter Type: Individual    Any diabetes medication dose changes were made via the CDE Protocol and Collaborative Practice Agreement with the patient's primary care provider. A copy of this encounter was shared with the provider.

## 2021-04-19 NOTE — PATIENT INSTRUCTIONS
Diabetes Support Resources:  1. Check on coverage for Ozempic  0.5 mg vs Trulicity 0.75 mg  These are once per week  Check on Jardiance as well.      2. Metformin  mg take 2 pills twice per day.      3. Victoza take 1.8 mg daily    4. When driving get out and stretch every 2 hrs.     5. When you come back, please send me a my message to look at your Dexcom   Bring blood glucose meter and logbook with you to all doctor and follow-up appointments.    Diabetes Education Telephone Visit Follow-up:    We realize your time is valuable and your health is important! We offer a convenient Telephone Visit follow up! It s a quick way to check in for a medication dose adjustment without having to come back to clinic as soon.    Telephone Visits are often covered by insurance. Please check with your insurance plan to see if this type of visit is covered. If not, the cost is less expensive than an office visit:      Up to 10 minutes (Code 52292): $30    11-20 minutes (Code 62700): $59    More than 20 minutes (Code 94654): $85    Talk with your Diabetes Educator if you want to learn more.      [facility]:  For Your Diabetes Education and Nutrition Appointments Call:  [phone]   For Diabetes Education or Nutrition Related Questions:   Phone: [phone]  Send meinKauf Message   If you need a medication refill please contact your pharmacy. Please allow 3 business days for your refills to be completed.

## 2021-06-16 ENCOUNTER — OFFICE VISIT (OUTPATIENT)
Dept: FAMILY MEDICINE | Facility: CLINIC | Age: 61
End: 2021-06-16
Payer: COMMERCIAL

## 2021-06-16 VITALS
BODY MASS INDEX: 29.8 KG/M2 | HEART RATE: 83 BPM | TEMPERATURE: 98 F | OXYGEN SATURATION: 97 % | RESPIRATION RATE: 16 BRPM | WEIGHT: 220 LBS | HEIGHT: 72 IN | DIASTOLIC BLOOD PRESSURE: 54 MMHG | SYSTOLIC BLOOD PRESSURE: 130 MMHG

## 2021-06-16 DIAGNOSIS — Q18.1 CYST ON EAR: Primary | ICD-10-CM

## 2021-06-16 PROCEDURE — 99213 OFFICE O/P EST LOW 20 MIN: CPT | Performed by: FAMILY MEDICINE

## 2021-06-16 RX ORDER — DOXYCYCLINE HYCLATE 100 MG
100 TABLET ORAL 2 TIMES DAILY
Qty: 20 TABLET | Refills: 0 | Status: SHIPPED | OUTPATIENT
Start: 2021-06-16 | End: 2021-06-26

## 2021-06-16 ASSESSMENT — MIFFLIN-ST. JEOR: SCORE: 1847.29

## 2021-06-16 ASSESSMENT — PAIN SCALES - GENERAL: PAINLEVEL: MODERATE PAIN (5)

## 2021-06-16 NOTE — PROGRESS NOTES
"  Assessment & Plan     Cyst on ear  Per history and exam does seem like a multiloculated sebaceous cyst, inflamed. But due to location I felt that the risks of excision by myself would outweigh the benefit. Explained to patient the abx are not curative but should help calm it down while he waits to see derm since he is having such severe discomfort  - ADULT DERMATOLOGY REFERRAL  - doxycycline hyclate (VIBRA-TABS) 100 MG tablet  Dispense: 20 tablet; Refill: 0    F/u as needed    Ernestina Donovan MD  [facility]        Lenka Neely is a 61 year old who presents for the following health issues     HPI     Skin Lesion  Onset/Duration: couple of week  Description  Location: behind left ear  Color: looks like a boil;   Border description: raised, bleeding  Character: burning  Itching: no  Bleeding:  no  Intensity:  moderate  Progression of Symptoms:  same  Accompanying signs and symptoms:   Bleeding: no  Scaling: no  Excessive sun exposure/tanning: no  Sunscreen used: YES  History:           Any previous history of skin cancer: no  Any family history of melanoma: no  Previous episodes of similar lesion: no  Precipitating or alleviating factors: none   Therapies tried and outcome: heat was effective for a couple of days when it broke open, drained, and swelling improved but symptoms returned.    Feels very sure that he didn't have any bumps there before a couple of weeks ago  Keeps trying to help it drain with heat pack but not really able to get it to drain anymore.    Review of Systems   As above        Objective    /54 (BP Location: Right arm, Patient Position: Sitting, Cuff Size: Adult Large)   Pulse 83   Temp 98  F (36.7  C) (Tympanic)   Resp 16   Ht 1.823 m (5' 11.77\")   Wt 99.8 kg (220 lb)   SpO2 97%   BMI 30.03 kg/m    Body mass index is 30.03 kg/m .  Physical Exam   GENERAL: healthy, alert and no distress  RESP: normal respiratory effort, speaking in complete " sentences  SKIN: ~2cm large protruding mass of L posterior lobe with central eschar. Appears multiloculated and has Mixed fluctuance/firmness to palpation.  PSYCH: mentation appears normal, affect normal/bright

## 2021-06-17 ENCOUNTER — OFFICE VISIT (OUTPATIENT)
Dept: DERMATOLOGY | Facility: CLINIC | Age: 61
End: 2021-06-17
Payer: COMMERCIAL

## 2021-06-17 VITALS — OXYGEN SATURATION: 97 % | DIASTOLIC BLOOD PRESSURE: 69 MMHG | SYSTOLIC BLOOD PRESSURE: 128 MMHG | HEART RATE: 88 BPM

## 2021-06-17 DIAGNOSIS — Q18.1 CYST ON EAR: ICD-10-CM

## 2021-06-17 DIAGNOSIS — L02.91 ABSCESS: Primary | ICD-10-CM

## 2021-06-17 PROCEDURE — 10060 I&D ABSCESS SIMPLE/SINGLE: CPT | Performed by: PHYSICIAN ASSISTANT

## 2021-06-17 PROCEDURE — 87077 CULTURE AEROBIC IDENTIFY: CPT | Performed by: PHYSICIAN ASSISTANT

## 2021-06-17 PROCEDURE — 87070 CULTURE OTHR SPECIMN AEROBIC: CPT | Performed by: PHYSICIAN ASSISTANT

## 2021-06-17 PROCEDURE — 99203 OFFICE O/P NEW LOW 30 MIN: CPT | Mod: 25 | Performed by: PHYSICIAN ASSISTANT

## 2021-06-17 PROCEDURE — 87186 SC STD MICRODIL/AGAR DIL: CPT | Performed by: PHYSICIAN ASSISTANT

## 2021-06-17 NOTE — NURSING NOTE
"Initial /69 (BP Location: Right arm, Patient Position: Sitting)   Pulse 88   SpO2 97%  Estimated body mass index is 30.03 kg/m  as calculated from the following:    Height as of 6/16/21: 1.823 m (5' 11.77\").    Weight as of 6/16/21: 99.8 kg (220 lb). .      "

## 2021-06-17 NOTE — LETTER
6/17/2021         RE: Chin Jade  [address]  [place] [address]        Dear Colleague,    Thank you for referring your patient, Chin Jade, to the [facility]. Please see a copy of my visit note below.    HPI:   Chief complaints: Chin Jade is a pleasant 61 year old male who presents for evaluation of a painful swollen nodule on the back of the ear. It has been present for several weeks and he is miserable. He started doxycycline yesterday.         PHYSICAL EXAM:    /69 (BP Location: Right arm, Patient Position: Sitting)   Pulse 88   SpO2 97%   Skin exam performed as follows: Type 2 skin. Mood appropriate  Alert and Oriented X 3. Well developed, well nourished in no distress.  General appearance: Normal  Head including face: Normal  Eyes: conjunctiva and lids: Normal  Mouth: Lips, teeth, gums: Normal  Neck: Normal  Chest-breast/axillae: Normal  Back: Normal  Spleen and liver: Normal  Cardiovascular: Exam of peripheral vascular system by observation for swelling, varicosities, edema: Normal  Genitalia: groin, buttocks: Normal  Extremities: digits/nails (clubbing): Normal  Eccrine and Apocrine glands: Normal  Right upper extremity: Normal  Left upper extremity: Normal  Right lower extremity: Normal  Left lower extremity: Normal  Skin: Scalp and body hair: See below    Squishy plaque on the left posterior ear    ASSESSMENT/PLAN:     1. Abscess on the left posterior ear. Affected area cleaned with betadine x 3 then wiped away with alcoholol.  1 pecent lidocaine with epineprhine used to infiltrate the area with good anethesia.  Number 15 scapel used to make a stab incion.  Purlent drainage was removed . Appropraite wound care dressing applied.  Pt tolerated preocedure well.  --Culture taken  --Continue doxycycline as prescribed.         Follow-up: PRN  CC:   Scribed By: Whitley Rodrigues, MS, PA-C          Again, thank you for allowing me to participate in the care of  your patient.        Sincerely,        Whitley Tipton PA-C

## 2021-06-17 NOTE — PATIENT INSTRUCTIONS
Wound Care Instructions     FOR SUPERFICIAL WOUNDS     [facility] [phone]    [facility] [phone]               Left posterior ear  AFTER 24 HOURS YOU SHOULD REMOVE THE BANDAGE AND BEGIN DAILY DRESSING CHANGES AS FOLLOWS:     1) Remove Dressing.     2) Clean and dry the area with tap water using a Q-tip or sterile gauze pad.     3) Apply Vaseline, Aquaphor, Polysporin ointment or Bacitracin ointment over entire wound.  Do NOT use Neosporin ointment.     4) Cover the wound with a band-aid, or a sterile non-stick gauze pad and micropore paper tape      REPEAT THESE INSTRUCTIONS AT LEAST ONCE A DAY UNTIL THE WOUND HAS COMPLETELY HEALED.    It is an old wives tale that a wound heals better when it is exposed to air and allowed to dry out. The wound will heal faster with a better cosmetic result if it is kept moist with ointment and covered with a bandage.    **Do not let the wound dry out.**      Supplies Needed:      *Cotton tipped applicators (Q-tips)    *Polysporin Ointment or Bacitracin Ointment (NOT NEOSPORIN)    *Band-aids or non-stick gauze pads and micropore paper tape.      PATIENT INFORMATION:    During the healing process you will notice a number of changes. All wounds develop a small halo of redness surrounding the wound.  This means healing is occurring. Severe itching with extensive redness usually indicates sensitivity to the ointment or bandage tape used to dress the wound.  You should call our office if this develops.      Swelling  and/or discoloration around your surgical site is common, particularly when performed around the eye.    All wounds normally drain.  The larger the wound the more drainage there will be.  After 7-10 days, you will notice the wound beginning to shrink and new skin will begin to grow.  The wound is healed when you can see skin has formed over the entire area.  A healed wound has a healthy, shiny look to the surface and is red to dark  pink in color to normalize.  Wounds may take approximately 4-6 weeks to heal.  Larger wounds may take 6-8 weeks.  After the wound is healed you may discontinue dressing changes.    You may experience a sensation of tightness as your wound heals. This is normal and will gradually subside.    Your healed wound may be sensitive to temperature changes. This sensitivity improves with time, but if you re having a lot of discomfort, try to avoid temperature extremes.    Patients frequently experience itching after their wound appears to have healed because of the continue healing under the skin.  Plain Vaseline will help relieve the itching.        POSSIBLE COMPLICATIONS    BLEEDIN. Leave the bandage in place.  2. Use tightly rolled up gauze or a cloth to apply direct pressure over the bandage for 30  minutes.  3. Reapply pressure for an additional 30 minutes if necessary  4. Use additional gauze and tape to maintain pressure once the bleeding has stopped.

## 2021-06-17 NOTE — PROGRESS NOTES
HPI:   Chief complaints: Chin Jade is a pleasant 61 year old male who presents for evaluation of a painful swollen nodule on the back of the ear. It has been present for several weeks and he is miserable. He started doxycycline yesterday.         PHYSICAL EXAM:    /69 (BP Location: Right arm, Patient Position: Sitting)   Pulse 88   SpO2 97%   Skin exam performed as follows: Type 2 skin. Mood appropriate  Alert and Oriented X 3. Well developed, well nourished in no distress.  General appearance: Normal  Head including face: Normal  Eyes: conjunctiva and lids: Normal  Mouth: Lips, teeth, gums: Normal  Neck: Normal  Chest-breast/axillae: Normal  Back: Normal  Spleen and liver: Normal  Cardiovascular: Exam of peripheral vascular system by observation for swelling, varicosities, edema: Normal  Genitalia: groin, buttocks: Normal  Extremities: digits/nails (clubbing): Normal  Eccrine and Apocrine glands: Normal  Right upper extremity: Normal  Left upper extremity: Normal  Right lower extremity: Normal  Left lower extremity: Normal  Skin: Scalp and body hair: See below    Squishy plaque on the left posterior ear    ASSESSMENT/PLAN:     1. Abscess on the left posterior ear. Affected area cleaned with betadine x 3 then wiped away with alcoholol.  1 pecent lidocaine with epineprhine used to infiltrate the area with good anethesia.  Number 15 scapel used to make a stab incion.  Purlent drainage was removed . Appropraite wound care dressing applied.  Pt tolerated preocedure well.  --Culture taken  --Continue doxycycline as prescribed.         Follow-up: PRN  CC:   Scribed By: Whitley Rodrigues, MS, KYM

## 2021-06-19 LAB
BACTERIA SPEC CULT: ABNORMAL
BACTERIA SPEC CULT: ABNORMAL
Lab: ABNORMAL
SPECIMEN SOURCE: ABNORMAL

## 2021-07-24 ENCOUNTER — HEALTH MAINTENANCE LETTER (OUTPATIENT)
Age: 61
End: 2021-07-24

## 2021-09-15 DIAGNOSIS — R80.9 MICROALBUMINURIA: ICD-10-CM

## 2021-09-17 RX ORDER — LISINOPRIL 30 MG/1
TABLET ORAL
Qty: 90 TABLET | Refills: 1 | Status: SHIPPED | OUTPATIENT
Start: 2021-09-17 | End: 2022-03-14

## 2021-09-17 NOTE — TELEPHONE ENCOUNTER
"Requested Prescriptions   Pending Prescriptions Disp Refills     lisinopril (ZESTRIL) 30 MG tablet [Pharmacy Med Name: LISINOPRIL 30MG TABS] 90 tablet 1     Sig: TAKE ONE TABLET BY MOUTH ONCE DAILY       ACE Inhibitors (Including Combos) Protocol Failed - 9/15/2021  6:09 PM        Failed - Normal serum potassium on file in past 12 months     Recent Labs   Lab Test 03/30/20  1453   POTASSIUM 3.9             Passed - Blood pressure under 140/90 in past 12 months     BP Readings from Last 3 Encounters:   06/17/21 128/69   06/16/21 130/54   01/04/21 (!) 154/68                 Passed - Recent (12 mo) or future (30 days) visit within the authorizing provider's specialty     Patient has had an office visit with the authorizing provider or a provider within the authorizing providers department within the previous 12 mos or has a future within next 30 days. See \"Patient Info\" tab in inbasket, or \"Choose Columns\" in Meds & Orders section of the refill encounter.              Passed - Medication is active on med list        Passed - Patient is age 18 or older        Passed - Normal serum creatinine on file in past 12 months     Recent Labs   Lab Test 12/16/20  0000   CR 0.88       Ok to refill medication if creatinine is low               "

## 2021-09-18 ENCOUNTER — HEALTH MAINTENANCE LETTER (OUTPATIENT)
Age: 61
End: 2021-09-18

## 2021-10-17 DIAGNOSIS — E11.42 TYPE 2 DIABETES MELLITUS WITH DIABETIC POLYNEUROPATHY, WITHOUT LONG-TERM CURRENT USE OF INSULIN (H): ICD-10-CM

## 2021-10-18 RX ORDER — METFORMIN HCL 500 MG
1000 TABLET, EXTENDED RELEASE 24 HR ORAL 2 TIMES DAILY WITH MEALS
Qty: 360 TABLET | Refills: 0 | Status: SHIPPED | OUTPATIENT
Start: 2021-10-18 | End: 2021-10-20

## 2021-10-18 NOTE — TELEPHONE ENCOUNTER
Pending Prescriptions:                       Disp   Refills    metFORMIN (GLUCOPHAGE-XR) 500 MG 24 hr ta*360 ta*1            Sig: TAKE 2 TABLETS (1,000 MG) BY MOUTH 2 TIMES DAILY           (WITH MEALS)    Routing refill request to provider for review/approval because:  Labs out of range:    Lab Results   Component Value Date    A1C 7.6 12/16/2020    A1C 7.8 09/16/2020    A1C 7.8 08/21/2020    A1C 7.4 03/30/2020    A1C 7.5 03/02/2020       Carlos Beavers RN

## 2021-10-20 ENCOUNTER — OFFICE VISIT (OUTPATIENT)
Dept: FAMILY MEDICINE | Facility: CLINIC | Age: 61
End: 2021-10-20
Payer: COMMERCIAL

## 2021-10-20 VITALS
HEART RATE: 79 BPM | TEMPERATURE: 98.7 F | BODY MASS INDEX: 28.71 KG/M2 | HEIGHT: 72 IN | OXYGEN SATURATION: 95 % | WEIGHT: 212 LBS | RESPIRATION RATE: 14 BRPM | SYSTOLIC BLOOD PRESSURE: 122 MMHG | DIASTOLIC BLOOD PRESSURE: 64 MMHG

## 2021-10-20 DIAGNOSIS — Z72.0 TOBACCO ABUSE: ICD-10-CM

## 2021-10-20 DIAGNOSIS — E78.5 HYPERLIPIDEMIA LDL GOAL <100: ICD-10-CM

## 2021-10-20 DIAGNOSIS — E11.42 TYPE 2 DIABETES MELLITUS WITH DIABETIC POLYNEUROPATHY, WITHOUT LONG-TERM CURRENT USE OF INSULIN (H): Primary | ICD-10-CM

## 2021-10-20 DIAGNOSIS — K21.00 GASTROESOPHAGEAL REFLUX DISEASE WITH ESOPHAGITIS WITHOUT HEMORRHAGE: ICD-10-CM

## 2021-10-20 DIAGNOSIS — R80.9 MICROALBUMINURIA: ICD-10-CM

## 2021-10-20 LAB
ANION GAP SERPL CALCULATED.3IONS-SCNC: 7 MMOL/L (ref 3–14)
BUN SERPL-MCNC: 20 MG/DL (ref 7–30)
CALCIUM SERPL-MCNC: 9.4 MG/DL (ref 8.5–10.1)
CHLORIDE BLD-SCNC: 108 MMOL/L (ref 94–109)
CHOLEST SERPL-MCNC: 115 MG/DL
CO2 SERPL-SCNC: 26 MMOL/L (ref 20–32)
CREAT SERPL-MCNC: 0.85 MG/DL (ref 0.66–1.25)
FASTING STATUS PATIENT QL REPORTED: YES
GFR SERPL CREATININE-BSD FRML MDRD: >90 ML/MIN/1.73M2
GLUCOSE BLD-MCNC: 145 MG/DL (ref 70–99)
HBA1C MFR BLD: 7.2 % (ref 0–5.6)
HDLC SERPL-MCNC: 39 MG/DL
LDLC SERPL CALC-MCNC: 62 MG/DL
NONHDLC SERPL-MCNC: 76 MG/DL
POTASSIUM BLD-SCNC: 3.9 MMOL/L (ref 3.4–5.3)
SODIUM SERPL-SCNC: 141 MMOL/L (ref 133–144)
TRIGL SERPL-MCNC: 71 MG/DL

## 2021-10-20 PROCEDURE — 36415 COLL VENOUS BLD VENIPUNCTURE: CPT | Performed by: FAMILY MEDICINE

## 2021-10-20 PROCEDURE — 80061 LIPID PANEL: CPT | Performed by: FAMILY MEDICINE

## 2021-10-20 PROCEDURE — 80048 BASIC METABOLIC PNL TOTAL CA: CPT | Performed by: FAMILY MEDICINE

## 2021-10-20 PROCEDURE — 99214 OFFICE O/P EST MOD 30 MIN: CPT | Performed by: FAMILY MEDICINE

## 2021-10-20 PROCEDURE — 83036 HEMOGLOBIN GLYCOSYLATED A1C: CPT | Performed by: FAMILY MEDICINE

## 2021-10-20 RX ORDER — SEMAGLUTIDE 1.34 MG/ML
1 INJECTION, SOLUTION SUBCUTANEOUS WEEKLY
Qty: 9 ML | Refills: 1 | Status: SHIPPED | OUTPATIENT
Start: 2021-10-20 | End: 2021-10-21

## 2021-10-20 RX ORDER — METFORMIN HCL 500 MG
1000 TABLET, EXTENDED RELEASE 24 HR ORAL 2 TIMES DAILY WITH MEALS
Qty: 360 TABLET | Refills: 1 | Status: SHIPPED | OUTPATIENT
Start: 2021-10-20 | End: 2022-05-19

## 2021-10-20 RX ORDER — ATORVASTATIN CALCIUM 40 MG/1
40 TABLET, FILM COATED ORAL DAILY
Qty: 90 TABLET | Refills: 3 | Status: SHIPPED | OUTPATIENT
Start: 2021-10-20 | End: 2023-01-02

## 2021-10-20 RX ORDER — FAMOTIDINE 40 MG/1
TABLET, FILM COATED ORAL
Qty: 90 TABLET | Refills: 3 | Status: SHIPPED | OUTPATIENT
Start: 2021-10-20

## 2021-10-20 ASSESSMENT — MIFFLIN-ST. JEOR: SCORE: 1810.66

## 2021-10-20 ASSESSMENT — PAIN SCALES - GENERAL: PAINLEVEL: NO PAIN (0)

## 2021-10-20 NOTE — PATIENT INSTRUCTIONS
Our Clinic hours are:  Mondays    7:20 am - 7 pm  Tues -  Fri  7:20 am - 5 pm    Clinic Phone: [phone]    The clinic lab opens at 7:30 am Mon - Fri and appointments are required.    [facility]. [phone]  Monday  8 am - 7pm  Tues - Fri 8 am - 5:30 pm

## 2021-10-20 NOTE — PROGRESS NOTES
"  Assessment & Plan     Type 2 diabetes mellitus with diabetic polyneuropathy, without long-term current use of insulin (H)  Fair control at 7.2%, this is improved from last December  He continues to have some weight loss on the ozempic.  He is encouraged to follow up with diabetic ed for a CGM download, etc    - Hemoglobin A1c; Future  - Basic metabolic panel  (Ca, Cl, CO2, Creat, Gluc, K, Na, BUN); Future  - semaglutide (OZEMPIC, 0.25 OR 0.5 MG/DOSE,) 2 MG/1.5ML SOPN pen; Inject 1 mg Subcutaneous once a week  - metFORMIN (GLUCOPHAGE-XR) 500 MG 24 hr tablet; Take 2 tablets (1,000 mg) by mouth 2 times daily (with meals)  - Hemoglobin A1c  - Basic metabolic panel  (Ca, Cl, CO2, Creat, Gluc, K, Na, BUN)  - Albumin Random Urine Quantitative with Creat Ratio; Future    Hyperlipidemia LDL goal <100     - Lipid panel reflex to direct LDL Non-fasting; Future  - atorvastatin (LIPITOR) 40 MG tablet; Take 1 tablet (40 mg) by mouth daily  - Lipid panel reflex to direct LDL Non-fasting    Gastroesophageal reflux disease with esophagitis without hemorrhage   stable  - famotidine (PEPCID) 40 MG tablet; Take 1 tablet daily    Tobacco abuse  Encouraged cessation  Doesn't meet criteria for LDCT at this time    Microalbuminuria  Recheck urine microalbumin today             Tobacco Cessation:   reports that he has been smoking cigarettes. He has a 20.00 pack-year smoking history. He has quit using smokeless tobacco.  Tobacco Cessation Action Plan: Information offered: Patient not interested at this time    BMI:   Estimated body mass index is 28.95 kg/m  as calculated from the following:    Height as of this encounter: 1.822 m (5' 11.75\").    Weight as of this encounter: 96.2 kg (212 lb).           Return in about 6 months (around 4/20/2022) for diabetes recheck.    Leia Isabel MD  [facility]   Chin is a 61 year old who presents for the following health issues     HPI     Diabetes " Follow-up    How often are you checking your blood sugar? Continuous glucose monitor  What time of day are you checking your blood sugars (select all that apply)?  Before and after meals and At bedtime  Have you had any blood sugars above 200?  No  Have you had any blood sugars below 70?  No    What symptoms do you notice when your blood sugar is low?  None    What concerns do you have today about your diabetes? None     Do you have any of these symptoms? (Select all that apply)  Numbness in feet and Burning in feet        Hyperlipidemia Follow-Up      Are you regularly taking any medication or supplement to lower your cholesterol?   Yes- lipitor    Are you having muscle aches or other side effects that you think could be caused by your cholesterol lowering medication?  Yes- hand cramps    Hypertension Follow-up      Do you check your blood pressure regularly outside of the clinic? No     Are you following a low salt diet? No    Are your blood pressures ever more than 140 on the top number (systolic) OR more   than 90 on the bottom number (diastolic), for example 140/90? No    BP Readings from Last 2 Encounters:   10/20/21 122/64   06/17/21 128/69     Hemoglobin A1C (%)   Date Value   12/16/2020 7.6 (A)   09/16/2020 7.8 (A)     LDL Cholesterol Calculated (mg/dL)   Date Value   12/16/2020 81   03/30/2020 64         How many servings of fruits and vegetables do you eat daily?  4 or more    On average, how many sweetened beverages do you drink each day (Examples: soda, juice, sweet tea, etc.  Do NOT count diet or artificially sweetened beverages)?   0    How many days per week do you exercise enough to make your heart beat faster? 3 or less    How many minutes a day do you exercise enough to make your heart beat faster? Very active at work    How many days per week do you miss taking your medication? 0        Review of Systems   Constitutional, HEENT, cardiovascular, pulmonary, gi and gu systems are negative, except as  "otherwise noted.      Objective    /64   Pulse 79   Temp 98.7  F (37.1  C) (Tympanic)   Resp 14   Ht 1.822 m (5' 11.75\")   Wt 96.2 kg (212 lb)   SpO2 95%   BMI 28.95 kg/m    Body mass index is 28.95 kg/m .  Physical Exam   GENERAL: healthy, alert and no distress  NECK: no adenopathy, no asymmetry, masses, or scars and thyroid normal to palpation  RESP: lungs clear to auscultation - no rales, rhonchi or wheezes  CV: regular rate and rhythm, normal S1 S2, no S3 or S4, no murmur, click or rub, no peripheral edema and peripheral pulses strong  ABDOMEN: soft, nontender, no hepatosplenomegaly, no masses and bowel sounds normal  MS: no gross musculoskeletal defects noted, no edema    Results for orders placed or performed in visit on 10/20/21 (from the past 24 hour(s))   Hemoglobin A1c   Result Value Ref Range    Hemoglobin A1C 7.2 (H) 0.0 - 5.6 %               "

## 2021-10-21 ENCOUNTER — TELEPHONE (OUTPATIENT)
Dept: FAMILY MEDICINE | Facility: CLINIC | Age: 61
End: 2021-10-21

## 2021-10-21 DIAGNOSIS — E11.42 TYPE 2 DIABETES MELLITUS WITH DIABETIC POLYNEUROPATHY, WITHOUT LONG-TERM CURRENT USE OF INSULIN (H): Primary | ICD-10-CM

## 2021-10-21 NOTE — TELEPHONE ENCOUNTER
Received a request from pharmacy for a new prescription with different instructions.   Ordered: Ozempic (0.25 or 0.5 mg/dose) 2 mg/1.5 ml: instructions say to inject 1 mg subcutaneous once a week.    Pharmacy is requesting a new prescription to be sent for 1 mg dose. New script is pended. Routed to provider.  Maya Lehman RN

## 2021-10-22 ENCOUNTER — LAB (OUTPATIENT)
Dept: LAB | Facility: CLINIC | Age: 61
End: 2021-10-22
Payer: COMMERCIAL

## 2021-10-22 DIAGNOSIS — E11.42 TYPE 2 DIABETES MELLITUS WITH DIABETIC POLYNEUROPATHY, WITHOUT LONG-TERM CURRENT USE OF INSULIN (H): ICD-10-CM

## 2021-10-22 PROCEDURE — 82043 UR ALBUMIN QUANTITATIVE: CPT

## 2021-10-23 LAB
CREAT UR-MCNC: 238 MG/DL
MICROALBUMIN UR-MCNC: 134 MG/L
MICROALBUMIN/CREAT UR: 56.3 MG/G CR (ref 0–17)

## 2021-10-26 RX ORDER — SEMAGLUTIDE 1.34 MG/ML
INJECTION, SOLUTION SUBCUTANEOUS
Qty: 6 ML | Refills: 1 | OUTPATIENT
Start: 2021-10-26

## 2021-11-01 ENCOUNTER — TELEPHONE (OUTPATIENT)
Dept: FAMILY MEDICINE | Facility: CLINIC | Age: 61
End: 2021-11-01

## 2021-11-01 NOTE — TELEPHONE ENCOUNTER
Patient notified. He understands and will call if there are any questions or side effects.  Maya Lehman RN

## 2021-11-01 NOTE — TELEPHONE ENCOUNTER
Goal HgbA1c is <7.  I'd like him to try the higher dose and we can re-consider if there are side effects.    Better control results in fewer long term complications of diabetes.    Leia Isabel M.D.

## 2021-11-01 NOTE — TELEPHONE ENCOUNTER
Pt requesting clarification on Ozempic dose.  Dose was increased from 0.5 mg/week to 1 mg/week.  Pt states his A1C has been good and didn't understand why it was increased?  Last A1C was on 10/20/21 was 7.2.  Advise.  Glo

## 2021-11-01 NOTE — TELEPHONE ENCOUNTER
Reason for call:  Patient reporting a symptom    Symptom or request: Pt states that he was called from his pharmacy and his Ozempic dose was increased.  Pt does not understand why, because his A1c wasn't too  Bad recently and he was never notified that the MD changed/increased the dose?  Please call patient and advise.      Duration (how long have symptoms been present): ongoing    Have you been treated for this before? Yes    Additional comments:     Phone Number patient can be reached at:  Home number on file [phone] (home)    Best Time:  any    Can we leave a detailed message on this number:  YES    Call taken on 11/1/2021 at 2:50 PM by Angelica Monge

## 2021-12-03 ENCOUNTER — IMMUNIZATION (OUTPATIENT)
Dept: FAMILY MEDICINE | Facility: CLINIC | Age: 61
End: 2021-12-03
Payer: COMMERCIAL

## 2021-12-03 PROCEDURE — 91306 COVID-19,PF,MODERNA (18+ YRS BOOSTER .25ML): CPT

## 2021-12-03 PROCEDURE — 0064A COVID-19,PF,MODERNA (18+ YRS BOOSTER .25ML): CPT

## 2021-12-19 DIAGNOSIS — E11.42 TYPE 2 DIABETES MELLITUS WITH DIABETIC POLYNEUROPATHY, WITHOUT LONG-TERM CURRENT USE OF INSULIN (H): ICD-10-CM

## 2021-12-20 RX ORDER — ASPIRIN 81 MG/1
TABLET, COATED ORAL
Qty: 100 TABLET | Refills: 2 | Status: SHIPPED | OUTPATIENT
Start: 2021-12-20 | End: 2022-10-25

## 2022-01-01 ENCOUNTER — TRANSFERRED RECORDS (OUTPATIENT)
Dept: HEALTH INFORMATION MANAGEMENT | Facility: CLINIC | Age: 62
End: 2022-01-01
Payer: COMMERCIAL

## 2022-01-01 LAB — RETINOPATHY: NORMAL

## 2022-01-04 DIAGNOSIS — E11.42 TYPE 2 DIABETES MELLITUS WITH DIABETIC POLYNEUROPATHY, WITHOUT LONG-TERM CURRENT USE OF INSULIN (H): ICD-10-CM

## 2022-01-08 ENCOUNTER — HEALTH MAINTENANCE LETTER (OUTPATIENT)
Age: 62
End: 2022-01-08

## 2022-01-10 RX ORDER — PROCHLORPERAZINE 25 MG/1
SUPPOSITORY RECTAL
Qty: 1 EACH | Refills: 3 | Status: SHIPPED | OUTPATIENT
Start: 2022-01-10 | End: 2023-01-16

## 2022-01-10 RX ORDER — PROCHLORPERAZINE 25 MG/1
SUPPOSITORY RECTAL
Qty: 3 EACH | Refills: 11 | Status: SHIPPED | OUTPATIENT
Start: 2022-01-10 | End: 2023-01-16

## 2022-04-30 ENCOUNTER — HEALTH MAINTENANCE LETTER (OUTPATIENT)
Age: 62
End: 2022-04-30

## 2022-05-19 ENCOUNTER — OFFICE VISIT (OUTPATIENT)
Dept: FAMILY MEDICINE | Facility: CLINIC | Age: 62
End: 2022-05-19
Payer: COMMERCIAL

## 2022-05-19 VITALS
HEART RATE: 82 BPM | TEMPERATURE: 96.8 F | BODY MASS INDEX: 27.77 KG/M2 | HEIGHT: 72 IN | RESPIRATION RATE: 16 BRPM | SYSTOLIC BLOOD PRESSURE: 132 MMHG | WEIGHT: 205 LBS | OXYGEN SATURATION: 96 % | DIASTOLIC BLOOD PRESSURE: 58 MMHG

## 2022-05-19 DIAGNOSIS — R80.9 MICROALBUMINURIA: ICD-10-CM

## 2022-05-19 DIAGNOSIS — E11.42 TYPE 2 DIABETES MELLITUS WITH DIABETIC POLYNEUROPATHY, WITHOUT LONG-TERM CURRENT USE OF INSULIN (H): Primary | ICD-10-CM

## 2022-05-19 DIAGNOSIS — Z87.891 PERSONAL HISTORY OF TOBACCO USE: ICD-10-CM

## 2022-05-19 DIAGNOSIS — Z23 HIGH PRIORITY FOR 2019-NCOV VACCINE: ICD-10-CM

## 2022-05-19 LAB — HBA1C MFR BLD: 7.1 % (ref 0–5.6)

## 2022-05-19 PROCEDURE — 99207 PR FOOT EXAM NO CHARGE: CPT | Performed by: FAMILY MEDICINE

## 2022-05-19 PROCEDURE — 36415 COLL VENOUS BLD VENIPUNCTURE: CPT | Performed by: FAMILY MEDICINE

## 2022-05-19 PROCEDURE — 91306 COVID-19,PF,MODERNA (18+ YRS BOOSTER .25ML): CPT | Performed by: FAMILY MEDICINE

## 2022-05-19 PROCEDURE — 0064A COVID-19,PF,MODERNA (18+ YRS BOOSTER .25ML): CPT | Performed by: FAMILY MEDICINE

## 2022-05-19 PROCEDURE — 83036 HEMOGLOBIN GLYCOSYLATED A1C: CPT | Performed by: FAMILY MEDICINE

## 2022-05-19 PROCEDURE — 99213 OFFICE O/P EST LOW 20 MIN: CPT | Mod: 25 | Performed by: FAMILY MEDICINE

## 2022-05-19 RX ORDER — METFORMIN HCL 500 MG
1000 TABLET, EXTENDED RELEASE 24 HR ORAL 2 TIMES DAILY WITH MEALS
Qty: 360 TABLET | Refills: 1 | Status: SHIPPED | OUTPATIENT
Start: 2022-05-19 | End: 2023-01-18

## 2022-05-19 RX ORDER — LISINOPRIL 30 MG/1
30 TABLET ORAL DAILY
Qty: 90 TABLET | Refills: 1 | Status: SHIPPED | OUTPATIENT
Start: 2022-05-19 | End: 2022-12-12

## 2022-05-19 RX ORDER — SEMAGLUTIDE 1.34 MG/ML
1 INJECTION, SOLUTION SUBCUTANEOUS
Qty: 3 ML | Refills: 0 | Status: SHIPPED | OUTPATIENT
Start: 2022-05-19 | End: 2022-06-14

## 2022-05-19 ASSESSMENT — PAIN SCALES - GENERAL: PAINLEVEL: NO PAIN (0)

## 2022-05-19 NOTE — PATIENT INSTRUCTIONS
Our Clinic hours are:  Mondays    7:20 am - 7 pm  Tues - Fri  7:20 am - 5 pm    Clinic Phone: [phone]    The clinic lab opens at 7:30 am Mon - Fri and appointments are required.    [facility]. [phone]  Monday  8 am - 7pm  Tues - Fri 8 am - 5:30 pm       Lung Cancer Screening   Frequently Asked Questions  If you are at high-risk for lung cancer, getting screened with low-dose computed tomography (LDCT) every year can help save your life. This handout offers answers to some of the most common questions about lung cancer screening. If you have other questions, please call [phone] ([phone]).     What is it?  Lung cancer screening uses special X-ray technology to create an image of your lung tissue. The exam is quick and easy and takes less than 10 seconds. We don t give you any medicine or use any needles. You can eat before and after the exam. You don t need to change your clothes as long as the clothing on your chest doesn t contain metal. But, you do need to be able to hold your breath for at least 6 seconds during the exam.    What is the goal of lung cancer screening?  The goal of lung cancer screening is to save lives. Many times, lung cancer is not found until a person starts having physical symptoms. Lung cancer screening can help detect lung cancer in the earliest stages when it may be easier to treat.    Who should be screened for lung cancer?  We suggest lung cancer screening for anyone who is at high-risk for lung cancer. You are in the high-risk group if you:      are between the ages of 55 and 79, and    have smoked at least 1 pack of cigarettes a day for 20 or more years, and    still smoke or have quit within the past 15 years.    However, if you have a new cough or shortness of breath, you should talk to your doctor before being screened.    Why does it matter if I have symptoms?  Certain symptoms can be a sign that you have a condition in your  lungs that should be checked and treated by your doctor. These symptoms include fever, chest pain, a new or changing cough, shortness of breath that you have never felt before, coughing up blood or unexplained weight loss. Having any of these symptoms can greatly affect the results of lung cancer screening.       Should all smokers get an LDCT lung cancer screening exam?  It depends. Lung cancer screening is for a very specific group of men and women who have a history of heavy smoking over a long period of time (see  Who should be screened for lung cancer  above).  I am in the high-risk group, but have been diagnosed with cancer in the past. Is LDCT lung cancer screening right for me?  In some cases, you should not have LDCT lung screening, such as when your doctor is already following your cancer with CT scan studies. Your doctor will help you decide if LDCT lung screening is right for you.  Do I need to have a screening exam every year?  Yes. If you are in the high-risk group described earlier, you should get an LDCT lung cancer screening exam every year until you are 79, or are no longer willing or able to undergo screening and possible procedures to diagnose and treat lung cancer.  How effective is LDCT at preventing death from lung cancer?  Studies have shown that LDCT lung cancer screening can lower the risk of death from lung cancer by 20 percent in people who are at high-risk.  What are the risks?  There are some risks and limitations of LDCT lung cancer screening. We want to make sure you understand the risks and benefits, so please let us know if you have any questions. Your doctor may want to talk with you more about these risks.    Radiation exposure: As with any exam that uses radiation, there is a very small increased risk of cancer. The amount of radiation in LDCT is small--about the same amount a person would get from a mammogram. Your doctor orders the exam when he or she feels the potential  benefits outweigh the risks.    False negatives: No test is perfect, including LDCT. It is possible that you may have a medical condition, including lung cancer, that is not found during your exam. This is called a false negative result.    False positives and more testing: LDCT very often finds something in the lung that could be cancer, but in fact is not. This is called a false positive result. False positive tests often cause anxiety. To make sure these findings are not cancer, you may need to have more tests. These tests will be done only if you give us permission. Sometimes patients need a treatment that can have side effects, such as a biopsy. For more information on false positives, see  What can I expect from the results?     Findings not related to lung cancer: Your LDCT exam also takes pictures of areas of your body next to your lungs. In a very small number of cases, the CT scan will show an abnormal finding in one of these areas, such as your kidneys, adrenal glands, liver or thyroid. This finding may not be serious, but you may need more tests. Your doctor can help you decide what other tests you may need, if any.  What can I expect from the results?  About 1 out of 4 LDCT exams will find something that may need more tests. Most of the time, these findings are lung nodules. Lung nodules are very small collections of tissue in the lung. These nodules are very common, and the vast majority--more than 97 percent--are not cancer (benign). Most are normal lymph nodes or small areas of scarring from past infections.  But, if a small lung nodule is found to be cancer, the cancer can be cured more than 90 percent of the time. To know if the nodule is cancer, we may need to get more images before your next yearly screening exam. If the nodule has suspicious features (for example, it is large, has an odd shape or grows over time), we will refer you to a specialist for further testing.  Will my doctor also get  the results?  Yes. Your doctor will get a copy of your results.  Is it okay to keep smoking now that there s a cancer screening exam?  No. Tobacco is one of the strongest cancer-causing agents. It causes not only lung cancer, but other cancers and cardiovascular (heart) diseases as well. The damage caused by smoking builds over time. This means that the longer you smoke, the higher your risk of disease. While it is never too late to quit, the sooner you quit, the better.  Where can I find help to quit smoking?  The best way to prevent lung cancer is to stop smoking. If you have already quit smoking, congratulations and keep it up! For help on quitting smoking, please call [employer] at 1-916-QUITNOW (1-688.526.4635) or the [employer] at 1-668.738.7496 to find local resources near you.  One-on-one health coaching:  If you d prefer to work individually with a health care provider on tobacco cessation, we offer:      Medication Therapy Management:  Our specially trained pharmacists work closely with you and your doctor to help you quit smoking.  Call [phone] or [phone] (toll free).

## 2022-05-19 NOTE — PROGRESS NOTES
"  Assessment & Plan     Type 2 diabetes mellitus with diabetic polyneuropathy, without long-term current use of insulin (H)  Good control  No change in medication  Continue dexcom for monitoring  Continue ozempic 1 mg/week and metformin 1000 mg bid  - Hemoglobin A1c; Future  - FOOT EXAM  - Semaglutide, 1 MG/DOSE, (OZEMPIC, 1 MG/DOSE,) 4 MG/3ML SOPN; Inject 1 mg Subcutaneous every 7 days  - metFORMIN (GLUCOPHAGE XR) 500 MG 24 hr tablet; Take 2 tablets (1,000 mg) by mouth 2 times daily (with meals)  - Hemoglobin A1c    Microalbuminuria     - lisinopril (ZESTRIL) 30 MG tablet; Take 1 tablet (30 mg) by mouth daily    Personal history of tobacco use  Encouraged cessation  Needs LDCT  - Prof fee: Shared Decision Making for Lung Cancer Screening  - CT Chest Lung Cancer Scrn Low Dose wo; Future    High priority for 2019-nCoV vaccine     - COVID-19,PF,MODERNA (18+ Yrs BOOSTER .25mL)             BMI:   Estimated body mass index is 28 kg/m  as calculated from the following:    Height as of this encounter: 1.822 m (5' 11.75\").    Weight as of this encounter: 93 kg (205 lb).           Return in about 6 months (around 11/19/2022) for diabetes recheck.    Leia Isabel MD  [facility]   Chin is a 62 year old who presents for the following health issues     History of Present Illness       Diabetes:   He presents for follow up of diabetes.  He is checking home blood glucose with a continuous glucose monitor.  He checks blood glucose after meals.  Blood glucose is never over 200 and never under 70. He is aware of hypoglycemia symptoms including shakiness, dizziness and weakness. He has no concerns regarding his diabetes at this time.  He is having numbness in feet. The patient has had a diabetic eye exam in the last 12 months. Eye exam performed on Jan 1 2022. [facility]        He eats 2-3 servings of fruits and vegetables daily.He consumes 0 sweetened " "beverage(s) daily.He exercises with enough effort to increase his heart rate 30 to 60 minutes per day.  He exercises with enough effort to increase his heart rate 4 days per week.   He is taking medications regularly.     dexcom says he's 86% in range.   No hypoglycemia            Review of Systems   Constitutional, HEENT, cardiovascular, pulmonary, gi and gu systems are negative, except as otherwise noted.      Objective    /58   Pulse 82   Temp 96.8  F (36  C) (Tympanic)   Resp 16   Ht 1.822 m (5' 11.75\")   Wt 93 kg (205 lb)   SpO2 96%   BMI 28.00 kg/m    Body mass index is 28 kg/m .  Physical Exam   GENERAL: healthy, alert and no distress  NECK: no adenopathy, no asymmetry, masses, or scars and thyroid normal to palpation  RESP: lungs clear to auscultation - no rales, rhonchi or wheezes  CV: regular rate and rhythm, normal S1 S2, no S3 or S4, no murmur, click or rub, no peripheral edema and peripheral pulses strong  ABDOMEN: soft, nontender, no hepatosplenomegaly, no masses and bowel sounds normal  MS: no gross musculoskeletal defects noted, no edema    Results for orders placed or performed in visit on 05/19/22 (from the past 24 hour(s))   Hemoglobin A1c   Result Value Ref Range    Hemoglobin A1C 7.1 (H) 0.0 - 5.6 %               Lung Cancer Screening Shared Decision Making Visit     Chin Jade, a 62 year old male, is eligible for lung cancer screening    History   Smoking Status     Current Every Day Smoker     Packs/day: 0.50     Years: 45.00     Types: Cigarettes   Smokeless Tobacco     Former User       I have discussed with patient the risks and benefits of screening for lung cancer with low-dose CT.     The risks include:    radiation exposure: one low dose chest CT has as much ionizing radiation as about 15 chest x-rays, or 6 months of background radiation living in [place]      false positives: most findings/nodules are NOT cancer, but some might still require additional " diagnostic evaluation, including biopsy    over-diagnosis: some slow growing cancers that might never have been clinically significant will be detected and treated unnecessarily     The benefit of early detection of lung cancer is contingent upon adherence to annual screening or more frequent follow up if indicated.     Furthermore, to benefit from screening, Chin must be willing and able to undergo diagnostic procedures, if indicated. Although no specific guide is available for determining severity of comorbidities, it is reasonable to withhold screening in patients who have greater mortality risk from other diseases.     We did discuss that the best way to prevent lung cancer is to not smoke.    Some patients may value a numeric estimation of lung cancer risk when evaluating if lung cancer screening is right for them, here is one calculator:    ShouldIScreen

## 2022-11-20 ENCOUNTER — HEALTH MAINTENANCE LETTER (OUTPATIENT)
Age: 62
End: 2022-11-20

## 2023-01-01 ENCOUNTER — TRANSFERRED RECORDS (OUTPATIENT)
Dept: MULTI SPECIALTY CLINIC | Facility: CLINIC | Age: 63
End: 2023-01-01

## 2023-01-01 LAB — RETINOPATHY: NORMAL

## 2023-01-16 ENCOUNTER — TELEPHONE (OUTPATIENT)
Dept: FAMILY MEDICINE | Facility: CLINIC | Age: 63
End: 2023-01-16

## 2023-01-16 DIAGNOSIS — E11.42 TYPE 2 DIABETES MELLITUS WITH DIABETIC POLYNEUROPATHY, WITHOUT LONG-TERM CURRENT USE OF INSULIN (H): ICD-10-CM

## 2023-01-16 RX ORDER — PROCHLORPERAZINE 25 MG/1
SUPPOSITORY RECTAL
Qty: 1 EACH | Refills: 0 | Status: SHIPPED | OUTPATIENT
Start: 2023-01-16 | End: 2023-04-19

## 2023-01-16 RX ORDER — PROCHLORPERAZINE 25 MG/1
SUPPOSITORY RECTAL
Qty: 9 EACH | Refills: 0 | Status: SHIPPED | OUTPATIENT
Start: 2023-01-16 | End: 2023-04-19

## 2023-01-16 NOTE — TELEPHONE ENCOUNTER
"Chin needs a diabetic follow-up. Thank you!    Radha Schmidt, RN      Requested Prescriptions   Pending Prescriptions Disp Refills     Continuous Blood Gluc Sensor (DEXCOM G6 SENSOR) MISC [Pharmacy Med Name: DEXCOM G6 SENSOR  MISC]  11     Sig: CHANGE EVERY 10 DAYS.       There is no refill protocol information for this order        Continuous Blood Gluc Transmit (DEXCOM G6 TRANSMITTER) MISC [Pharmacy Med Name: DEXCOM G6 TRANSMITTER  MISC] 1 each 3     Sig: CHANGE EVERY 3 MONTHS.       Diabetic Supplies Protocol Failed - 1/16/2023  8:45 AM        Failed - Recent (6 mo) or future (30 days) visit within the authorizing provider's specialty     Patient had office visit in the last 6 months or has a visit in the next 30 days with authorizing provider.  See \"Patient Info\" tab in inbasket, or \"Choose Columns\" in Meds & Orders section of the refill encounter.            Passed - Medication is active on med list        Passed - Patient is 18 years of age or older             "

## 2023-01-18 ENCOUNTER — TELEPHONE (OUTPATIENT)
Dept: FAMILY MEDICINE | Facility: CLINIC | Age: 63
End: 2023-01-18

## 2023-01-18 ENCOUNTER — OFFICE VISIT (OUTPATIENT)
Dept: FAMILY MEDICINE | Facility: CLINIC | Age: 63
End: 2023-01-18
Payer: COMMERCIAL

## 2023-01-18 VITALS
RESPIRATION RATE: 20 BRPM | WEIGHT: 203 LBS | HEIGHT: 72 IN | SYSTOLIC BLOOD PRESSURE: 138 MMHG | DIASTOLIC BLOOD PRESSURE: 70 MMHG | TEMPERATURE: 98.6 F | BODY MASS INDEX: 27.5 KG/M2 | HEART RATE: 72 BPM | OXYGEN SATURATION: 97 %

## 2023-01-18 DIAGNOSIS — E11.42 TYPE 2 DIABETES MELLITUS WITH DIABETIC POLYNEUROPATHY, WITHOUT LONG-TERM CURRENT USE OF INSULIN (H): Primary | ICD-10-CM

## 2023-01-18 DIAGNOSIS — Z72.0 TOBACCO ABUSE: ICD-10-CM

## 2023-01-18 DIAGNOSIS — R80.9 MICROALBUMINURIA: ICD-10-CM

## 2023-01-18 DIAGNOSIS — E78.5 HYPERLIPIDEMIA LDL GOAL <100: ICD-10-CM

## 2023-01-18 DIAGNOSIS — E11.42 TYPE 2 DIABETES MELLITUS WITH DIABETIC POLYNEUROPATHY, WITHOUT LONG-TERM CURRENT USE OF INSULIN (H): ICD-10-CM

## 2023-01-18 DIAGNOSIS — I10 ESSENTIAL HYPERTENSION: ICD-10-CM

## 2023-01-18 LAB
ANION GAP SERPL CALCULATED.3IONS-SCNC: 9 MMOL/L (ref 7–15)
BUN SERPL-MCNC: 20.8 MG/DL (ref 8–23)
CALCIUM SERPL-MCNC: 9.9 MG/DL (ref 8.8–10.2)
CHLORIDE SERPL-SCNC: 105 MMOL/L (ref 98–107)
CHOLEST SERPL-MCNC: 116 MG/DL
CREAT SERPL-MCNC: 0.84 MG/DL (ref 0.67–1.17)
CREAT UR-MCNC: 117.3 MG/DL
DEPRECATED HCO3 PLAS-SCNC: 27 MMOL/L (ref 22–29)
GFR SERPL CREATININE-BSD FRML MDRD: >90 ML/MIN/1.73M2
GLUCOSE SERPL-MCNC: 139 MG/DL (ref 70–99)
HBA1C MFR BLD: 7.9 % (ref 0–5.6)
HDLC SERPL-MCNC: 42 MG/DL
LDLC SERPL CALC-MCNC: 57 MG/DL
MICROALBUMIN UR-MCNC: 97.4 MG/L
MICROALBUMIN/CREAT UR: 83.03 MG/G CR (ref 0–17)
NONHDLC SERPL-MCNC: 74 MG/DL
POTASSIUM SERPL-SCNC: 4.4 MMOL/L (ref 3.4–5.3)
SODIUM SERPL-SCNC: 141 MMOL/L (ref 136–145)
TRIGL SERPL-MCNC: 83 MG/DL

## 2023-01-18 PROCEDURE — 80048 BASIC METABOLIC PNL TOTAL CA: CPT | Performed by: FAMILY MEDICINE

## 2023-01-18 PROCEDURE — 82043 UR ALBUMIN QUANTITATIVE: CPT | Performed by: FAMILY MEDICINE

## 2023-01-18 PROCEDURE — 36415 COLL VENOUS BLD VENIPUNCTURE: CPT | Performed by: FAMILY MEDICINE

## 2023-01-18 PROCEDURE — 91313 COVID-19 VACCINE BIVALENT BOOSTER 18+ (MODERNA): CPT | Performed by: FAMILY MEDICINE

## 2023-01-18 PROCEDURE — 82570 ASSAY OF URINE CREATININE: CPT | Performed by: FAMILY MEDICINE

## 2023-01-18 PROCEDURE — 80061 LIPID PANEL: CPT | Performed by: FAMILY MEDICINE

## 2023-01-18 PROCEDURE — 83036 HEMOGLOBIN GLYCOSYLATED A1C: CPT | Performed by: FAMILY MEDICINE

## 2023-01-18 PROCEDURE — 0134A COVID-19 VACCINE BIVALENT BOOSTER 18+ (MODERNA): CPT | Performed by: FAMILY MEDICINE

## 2023-01-18 PROCEDURE — 99214 OFFICE O/P EST MOD 30 MIN: CPT | Performed by: FAMILY MEDICINE

## 2023-01-18 RX ORDER — LISINOPRIL 30 MG/1
30 TABLET ORAL DAILY
Qty: 90 TABLET | Refills: 3 | Status: SHIPPED | OUTPATIENT
Start: 2023-01-18 | End: 2024-02-29

## 2023-01-18 RX ORDER — ATORVASTATIN CALCIUM 40 MG/1
40 TABLET, FILM COATED ORAL DAILY
Qty: 90 TABLET | Refills: 3 | Status: SHIPPED | OUTPATIENT
Start: 2023-01-18 | End: 2024-02-29

## 2023-01-18 RX ORDER — SEMAGLUTIDE 2.68 MG/ML
2 INJECTION, SOLUTION SUBCUTANEOUS WEEKLY
Qty: 9 ML | Refills: 1 | Status: SHIPPED | OUTPATIENT
Start: 2023-01-18 | End: 2023-08-01

## 2023-01-18 RX ORDER — SEMAGLUTIDE 1.34 MG/ML
1 INJECTION, SOLUTION SUBCUTANEOUS WEEKLY
Qty: 9 ML | Refills: 1 | Status: CANCELLED | OUTPATIENT
Start: 2023-01-18

## 2023-01-18 RX ORDER — BLOOD SUGAR DIAGNOSTIC
STRIP MISCELLANEOUS
Qty: 50 STRIP | Refills: 3 | Status: CANCELLED | OUTPATIENT
Start: 2023-01-18

## 2023-01-18 RX ORDER — METFORMIN HCL 500 MG
1000 TABLET, EXTENDED RELEASE 24 HR ORAL 2 TIMES DAILY WITH MEALS
Qty: 360 TABLET | Refills: 1 | Status: SHIPPED | OUTPATIENT
Start: 2023-01-18 | End: 2023-09-06

## 2023-01-18 RX ORDER — BLOOD SUGAR DIAGNOSTIC
STRIP MISCELLANEOUS
Qty: 50 STRIP | Refills: 3 | Status: SHIPPED | OUTPATIENT
Start: 2023-01-18 | End: 2024-03-06

## 2023-01-18 ASSESSMENT — PAIN SCALES - GENERAL: PAINLEVEL: NO PAIN (0)

## 2023-01-18 NOTE — TELEPHONE ENCOUNTER
Prior Authorization Retail Medication Request    Medication/Dose: Accu-chek guide test strips  ICD code (if different than what is on RX):  na  Previously Tried and Failed:  na  Rationale:  na    Insurance Name:  [employer]  Insurance ID:  [id number]      Pharmacy Information (if different than what is on RX)  Name:   [facility]  Phone:  [phone]

## 2023-01-18 NOTE — PROGRESS NOTES
"  Assessment & Plan     Type 2 diabetes mellitus with diabetic polyneuropathy, without long-term current use of insulin (H)  HgbA1c is 7.9% (his CGM would have indicated closer to 7.2%, but we'll go with the well calibrated lab result)  Increase Ozempic to 2 mg weekly  Patient in agreement with plan  Smoking cessation encouraged- he's thinking about this still    - Lipid panel reflex to direct LDL Non-fasting; Future  - Albumin Random Urine Quantitative with Creat Ratio; Future  - HEMOGLOBIN A1C; Future  - Lipid panel reflex to direct LDL Non-fasting  - Albumin Random Urine Quantitative with Creat Ratio  - HEMOGLOBIN A1C  - metFORMIN (GLUCOPHAGE XR) 500 MG 24 hr tablet; Take 2 tablets (1,000 mg) by mouth 2 times daily (with meals)  - ACCU-CHEK GUIDE test strip; Use to test blood sugar 1x daily or as needed to calibrate CGM  - Semaglutide, 2 MG/DOSE, (OZEMPIC, 2 MG/DOSE,) 8 MG/3ML SOPN; Inject 2 mg Subcutaneous once a week    Essential hypertension  Fairly well controlled  - BASIC METABOLIC PANEL; Future  - BASIC METABOLIC PANEL    Hyperlipidemia LDL goal <100  Continue atorvastatin  - atorvastatin (LIPITOR) 40 MG tablet; Take 1 tablet (40 mg) by mouth daily    Tobacco abuse  Declined LDCT  - TOBACCO CESSATION - FOR HEALTH MAINTENANCE    Microalbuminuria   continue ACEi  - lisinopril (ZESTRIL) 30 MG tablet; Take 1 tablet (30 mg) by mouth daily             Nicotine/Tobacco Cessation:  He reports that he has been smoking cigarettes. He has a 22.50 pack-year smoking history. He has quit using smokeless tobacco.  Nicotine/Tobacco Cessation Plan:   Information offered: Patient not interested at this time      BMI:   Estimated body mass index is 27.72 kg/m  as calculated from the following:    Height as of this encounter: 1.822 m (5' 11.75\").    Weight as of this encounter: 92.1 kg (203 lb).           Return in about 6 months (around 7/18/2023) for diabetes recheck.    Leia Isabel MD  [facility] " NERY Neely is a 63 year old, presenting for the following health issues:  Diabetes    Diabetes Follow-up  Metformin 1,000mg bid, ozempic mg SubQ weekly    History of Present Illness       Diabetes:   He presents for follow up of diabetes.  He is checking home blood glucose with a continuous glucose monitor.  He checks blood glucose before meals, after meals, before and after meals and at bedtime.  Blood glucose is sometimes over 200 and sometimes under 70. He is aware of hypoglycemia symptoms including shakiness, dizziness and weakness. He has no concerns regarding his diabetes at this time.  He is having numbness in feet. The patient has had a diabetic eye exam in the last 12 months.         He eats 2-3 servings of fruits and vegetables daily.He consumes 0 sweetened beverage(s) daily.He exercises with enough effort to increase his heart rate 20 to 29 minutes per day.  He exercises with enough effort to increase his heart rate 3 or less days per week.   He is taking medications regularly.       - Tobacco abuse. He is currently smoking cigarettes, 0.5 ppd.      Hyperlipidemia Follow-Up  Atorvastatin 40mg qd    Are you regularly taking any medication or supplement to lower your cholesterol?   Yes- statin    Are you having muscle aches or other side effects that you think could be caused by your cholesterol lowering medication?  No    Hypertension Follow-up  Lisinopril 30mg qd    Do you check your blood pressure regularly outside of the clinic? No     Are you following a low salt diet? No    Are your blood pressures ever more than 140 on the top number (systolic) OR more   than 90 on the bottom number (diastolic), for example 140/90? N/A    BP Readings from Last 2 Encounters:   01/18/23 138/70   05/19/22 132/58     Hemoglobin A1C (%)   Date Value   05/19/2022 7.1 (H)   10/20/2021 7.2 (H)   12/16/2020 7.6 (A)   09/16/2020 7.8 (A)     LDL Cholesterol Calculated (mg/dL)   Date Value   10/20/2021 62  "  12/16/2020 81   03/30/2020 64         Review of Systems   Constitutional, HEENT, cardiovascular, pulmonary, gi and gu systems are negative, except as otherwise noted.      Objective    /70   Pulse 72   Temp 98.6  F (37  C) (Tympanic)   Resp 20   Ht 1.822 m (5' 11.75\")   Wt 92.1 kg (203 lb)   SpO2 97%   BMI 27.72 kg/m    Body mass index is 27.72 kg/m .  Physical Exam   GENERAL: healthy, alert and no distress  NECK: no adenopathy, no asymmetry, masses, or scars and thyroid normal to palpation  RESP: lungs clear to auscultation - no rales, rhonchi or wheezes  CV: regular rate and rhythm, normal S1 S2, no S3 or S4, no murmur, click or rub, no peripheral edema and peripheral pulses strong  MS: no gross musculoskeletal defects noted, no edema    Results for orders placed or performed in visit on 01/18/23   HEMOGLOBIN A1C     Status: Abnormal   Result Value Ref Range    Hemoglobin A1C 7.9 (H) 0.0 - 5.6 %             "

## 2023-01-22 NOTE — TELEPHONE ENCOUNTER
PA Initiation    Medication: Accu-chek guide test strips PA INITIATED  Insurance Company: [employer] - Phone [phone] Fax [phone]  Pharmacy Filling the Rx: [facility] [place], [place] - [address]  Filling Pharmacy Phone: [phone]  Filling Pharmacy Fax:    Start Date: 1/22/2023    Central Prior Authorization Team   Phone: [phone]

## 2023-01-26 NOTE — TELEPHONE ENCOUNTER
Prior Authorization Approval    Authorization Effective Date:    Authorization Expiration Date:    Medication: Accu-chek guide test strips PA APPROVED  Approved Dose/Quantity: 50  Reference #:     Insurance Company: [employer] - Phone [phone] Fax [phone]  Expected CoPay:       CoPay Card Available:      Foundation Assistance Needed:    Which Pharmacy is filling the prescription (Not needed for infusion/clinic administered): [facility] [place], [place] - [address]  Pharmacy Notified: Yes  Patient Notified: Comment:  Pharmacy will notify patient.

## 2023-03-06 DIAGNOSIS — B00.1 RECURRENT HERPES LABIALIS: ICD-10-CM

## 2023-03-06 RX ORDER — ACYCLOVIR 400 MG/1
400 TABLET ORAL
Qty: 30 TABLET | Refills: 0 | Status: SHIPPED | OUTPATIENT
Start: 2023-03-06

## 2023-03-06 NOTE — TELEPHONE ENCOUNTER
Dr. Isabel,    The patient is called as he has not been prescribed this medication since 2019.  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  This medication should just be refilled.  I have attempted to explain my purpose on calling and the fact that all Rx's have an end date on them.  Please advise. Sandy LOCKHART RN

## 2023-03-27 NOTE — TELEPHONE ENCOUNTER
Tried calling Sandy twice and when transferred to  there is just dead air space and nothing happens. Will try again later.    Radha Schmidt RN     no edema, no murmurs, regular rate and rhythm

## 2023-04-15 ENCOUNTER — HEALTH MAINTENANCE LETTER (OUTPATIENT)
Age: 63
End: 2023-04-15

## 2023-04-20 ENCOUNTER — TELEPHONE (OUTPATIENT)
Dept: FAMILY MEDICINE | Facility: CLINIC | Age: 63
End: 2023-04-20
Payer: COMMERCIAL

## 2023-04-20 NOTE — TELEPHONE ENCOUNTER
Please route determinations to the Pharm Diabetes pool ([id number]).      Thank you!    Diabetes Care Services Team   [facility]  [address] [place] [address]

## 2023-04-20 NOTE — TELEPHONE ENCOUNTER
Please route determinations to the Pharm Diabetes pool ([id number]).      Thank you!    Diabetes Care Services Team   [facility]  [address] [place] [address]

## 2023-04-24 NOTE — TELEPHONE ENCOUNTER
Central Prior Authorization Team - Phone: [phone]     PA Initiation    Medication: Dexcom G6 Transmitter- PA INITIATED  Insurance Company:    Pharmacy Filling the Rx: [facility] - [place], [place] - [facility] [address]  Filling Pharmacy Phone: [phone]  Filling Pharmacy Fax:    Start Date: 4/24/2023

## 2023-04-24 NOTE — TELEPHONE ENCOUNTER
Central Prior Authorization Team - Phone: [phone]     PA Initiation    Medication: Dexcom G6 Sensor- PA INITIATED  Insurance Company:    Pharmacy Filling the Rx: [facility] - [place], [place] - [facility] [address]  Filling Pharmacy Phone: [phone]  Filling Pharmacy Fax:    Start Date: 4/24/2023

## 2023-04-26 NOTE — TELEPHONE ENCOUNTER
Central Prior Authorization Team - Phone: [phone]     Prior Authorization Approval    Authorization Effective Date: 4/26/2023  Authorization Expiration Date: 4/25/2024  Medication: Dexcom G6 Transmitter- PA approved  Approved Dose/Quantity: 1  Reference #:     Insurance Company:    Expected CoPay:       CoPay Card Available:      Foundation Assistance Needed:    Which Pharmacy is filling the prescription (Not needed for infusion/clinic administered): [facility] - [place] [address]  Pharmacy Notified: Yes  Patient Notified: YesComment:  pharmacy will notify when ready

## 2023-04-26 NOTE — TELEPHONE ENCOUNTER
Central Prior Authorization Team - Phone: [phone]     Prior Authorization Approval    Authorization Effective Date: 4/26/2023  Authorization Expiration Date: 4/25/2024  Medication: Dexcom G6 Sensor- PA APPROVED  Approved Dose/Quantity: 3  Reference #:     Insurance Company:    Expected CoPay:       CoPay Card Available:      Foundation Assistance Needed:    Which Pharmacy is filling the prescription (Not needed for infusion/clinic administered): [facility] - [facility] [address]  Pharmacy Notified: Yes  Patient Notified: YesComment:  pharmacy will notify when ready

## 2023-05-21 DIAGNOSIS — E11.42 TYPE 2 DIABETES MELLITUS WITH DIABETIC POLYNEUROPATHY, WITHOUT LONG-TERM CURRENT USE OF INSULIN (H): ICD-10-CM

## 2023-05-22 RX ORDER — ASPIRIN 81 MG/1
TABLET, DELAYED RELEASE ORAL
Qty: 100 TABLET | Refills: 0 | Status: SHIPPED | OUTPATIENT
Start: 2023-05-22 | End: 2023-09-06

## 2023-07-31 DIAGNOSIS — E11.42 TYPE 2 DIABETES MELLITUS WITH DIABETIC POLYNEUROPATHY, WITHOUT LONG-TERM CURRENT USE OF INSULIN (H): ICD-10-CM

## 2023-07-31 NOTE — LETTER
[facility]  [address]  [facility] [address]  [phone]  August 1, 2023    Chin Jade   [address]  [facility] [address]    Dear Chin,    We care about your health and have reviewed your health plan including your medical conditions, medication list, and lab results.  Based on this review, it is recommended that you follow up regarding the following health topic(s):     -Diabetes - Appointment needed for medication refills    Please call us at [phone] (or use [employer]) to schedule appt to address the above recommendations.     Thank you for trusting [facility] and we appreciate the opportunity to serve you.  We look forward to supporting your healthcare needs in the future.    Healthy Regards,      Your Health Care Team  [facility]

## 2023-08-01 DIAGNOSIS — E11.42 TYPE 2 DIABETES MELLITUS WITH DIABETIC POLYNEUROPATHY, WITHOUT LONG-TERM CURRENT USE OF INSULIN (H): ICD-10-CM

## 2023-08-01 RX ORDER — SEMAGLUTIDE 2.68 MG/ML
INJECTION, SOLUTION SUBCUTANEOUS
Qty: 9 ML | Refills: 1 | Status: SHIPPED | OUTPATIENT
Start: 2023-08-01 | End: 2024-03-06

## 2023-08-01 NOTE — TELEPHONE ENCOUNTER
"Chin is due for a diabetic recheck. Thank you!    Radha Schmidt, RN      Requested Prescriptions   Pending Prescriptions Disp Refills    OZEMPIC (2 MG/DOSE) 8 MG/3ML pen [Pharmacy Med Name: OZEMPIC (2 MG/DOSE) 8MG/3ML SOPN] 9 mL 1     Sig: INJECT 2 MG SUBCUTANEOUSLY ONCE A WEEK       GLP-1 Agonists Protocol Failed - 7/31/2023  4:48 PM        Failed - HgbA1C in past 3 or 6 months     If HgbA1C is 8 or greater, it needs to be on file within the past 3 months.  If less than 8, must be on file within the past 6 months.     Recent Labs   Lab Test 01/18/23  1509   A1C 7.9*             Failed - Recent (6 mo) or future (30 days) visit within the authorizing provider's specialty     Patient had office visit in the last 6 months or has a visit in the next 30 days with authorizing provider.  See \"Patient Info\" tab in inbasket, or \"Choose Columns\" in Meds & Orders section of the refill encounter.            Passed - Medication is active on med list        Passed - Patient is age 18 or older        Passed - Normal serum creatinine on file in past 12 months     Recent Labs   Lab Test 01/18/23  1509   CR 0.84       Ok to refill medication if creatinine is low               "

## 2023-08-02 RX ORDER — PROCHLORPERAZINE 25 MG/1
SUPPOSITORY RECTAL
Qty: 1 EACH | Refills: 0 | Status: SHIPPED | OUTPATIENT
Start: 2023-08-02 | End: 2023-11-02

## 2023-09-06 ENCOUNTER — LAB (OUTPATIENT)
Dept: LAB | Facility: CLINIC | Age: 63
End: 2023-09-06
Payer: COMMERCIAL

## 2023-09-06 ENCOUNTER — OFFICE VISIT (OUTPATIENT)
Dept: FAMILY MEDICINE | Facility: CLINIC | Age: 63
End: 2023-09-06
Payer: COMMERCIAL

## 2023-09-06 VITALS
BODY MASS INDEX: 26.48 KG/M2 | HEIGHT: 72 IN | SYSTOLIC BLOOD PRESSURE: 142 MMHG | WEIGHT: 195.5 LBS | OXYGEN SATURATION: 98 % | HEART RATE: 79 BPM | TEMPERATURE: 97.7 F | RESPIRATION RATE: 20 BRPM | DIASTOLIC BLOOD PRESSURE: 70 MMHG

## 2023-09-06 DIAGNOSIS — E11.42 TYPE 2 DIABETES MELLITUS WITH DIABETIC POLYNEUROPATHY, WITHOUT LONG-TERM CURRENT USE OF INSULIN (H): ICD-10-CM

## 2023-09-06 DIAGNOSIS — E11.42 TYPE 2 DIABETES MELLITUS WITH DIABETIC POLYNEUROPATHY, WITHOUT LONG-TERM CURRENT USE OF INSULIN (H): Primary | ICD-10-CM

## 2023-09-06 DIAGNOSIS — R01.1 UNDIAGNOSED CARDIAC MURMURS: ICD-10-CM

## 2023-09-06 DIAGNOSIS — Z87.891 PERSONAL HISTORY OF TOBACCO USE: ICD-10-CM

## 2023-09-06 LAB
ALBUMIN SERPL BCG-MCNC: 4.4 G/DL (ref 3.5–5.2)
ALP SERPL-CCNC: 155 U/L (ref 40–129)
ALT SERPL W P-5'-P-CCNC: 19 U/L (ref 0–70)
ANION GAP SERPL CALCULATED.3IONS-SCNC: 11 MMOL/L (ref 7–15)
AST SERPL W P-5'-P-CCNC: 19 U/L (ref 0–45)
BILIRUB SERPL-MCNC: 0.3 MG/DL
BUN SERPL-MCNC: 23.4 MG/DL (ref 8–23)
CALCIUM SERPL-MCNC: 9.7 MG/DL (ref 8.8–10.2)
CHLORIDE SERPL-SCNC: 105 MMOL/L (ref 98–107)
CHOLEST SERPL-MCNC: 118 MG/DL
CREAT SERPL-MCNC: 0.88 MG/DL (ref 0.67–1.17)
CREAT UR-MCNC: 219.3 MG/DL
DEPRECATED HCO3 PLAS-SCNC: 27 MMOL/L (ref 22–29)
EGFRCR SERPLBLD CKD-EPI 2021: >90 ML/MIN/1.73M2
GLUCOSE SERPL-MCNC: 129 MG/DL (ref 70–99)
HBA1C MFR BLD: 7.5 % (ref 0–5.6)
HDLC SERPL-MCNC: 38 MG/DL
LDLC SERPL CALC-MCNC: 64 MG/DL
MICROALBUMIN UR-MCNC: 111.9 MG/L
MICROALBUMIN/CREAT UR: 51.03 MG/G CR (ref 0–17)
NONHDLC SERPL-MCNC: 80 MG/DL
POTASSIUM SERPL-SCNC: 4.3 MMOL/L (ref 3.4–5.3)
PROT SERPL-MCNC: 6.8 G/DL (ref 6.4–8.3)
SODIUM SERPL-SCNC: 143 MMOL/L (ref 136–145)
TRIGL SERPL-MCNC: 79 MG/DL

## 2023-09-06 PROCEDURE — 99207 PR FOOT EXAM NO CHARGE: CPT | Performed by: FAMILY MEDICINE

## 2023-09-06 PROCEDURE — 82043 UR ALBUMIN QUANTITATIVE: CPT

## 2023-09-06 PROCEDURE — G0296 VISIT TO DETERM LDCT ELIG: HCPCS | Performed by: FAMILY MEDICINE

## 2023-09-06 PROCEDURE — 80061 LIPID PANEL: CPT

## 2023-09-06 PROCEDURE — 99214 OFFICE O/P EST MOD 30 MIN: CPT | Performed by: FAMILY MEDICINE

## 2023-09-06 PROCEDURE — 83036 HEMOGLOBIN GLYCOSYLATED A1C: CPT

## 2023-09-06 PROCEDURE — 80053 COMPREHEN METABOLIC PANEL: CPT

## 2023-09-06 PROCEDURE — 82570 ASSAY OF URINE CREATININE: CPT

## 2023-09-06 PROCEDURE — 36415 COLL VENOUS BLD VENIPUNCTURE: CPT

## 2023-09-06 RX ORDER — METFORMIN HCL 500 MG
1000 TABLET, EXTENDED RELEASE 24 HR ORAL 2 TIMES DAILY WITH MEALS
Qty: 360 TABLET | Refills: 1 | Status: SHIPPED | OUTPATIENT
Start: 2023-09-06 | End: 2024-02-29

## 2023-09-06 RX ORDER — PROCHLORPERAZINE 25 MG/1
SUPPOSITORY RECTAL
Qty: 9 EACH | Refills: 1 | Status: SHIPPED | OUTPATIENT
Start: 2023-09-06 | End: 2024-03-06

## 2023-09-06 RX ORDER — ASPIRIN 81 MG/1
81 TABLET ORAL DAILY
Qty: 100 TABLET | Refills: 4 | Status: SHIPPED | OUTPATIENT
Start: 2023-09-06

## 2023-09-06 ASSESSMENT — PAIN SCALES - GENERAL: PAINLEVEL: NO PAIN (0)

## 2023-09-06 NOTE — PROGRESS NOTES
"  Assessment & Plan     Type 2 diabetes mellitus with diabetic polyneuropathy, without long-term current use of insulin (H)  Fairly well controlled  Continue current medication  - HEMOGLOBIN A1C; Future  - Comprehensive metabolic panel (BMP + Alb, Alk Phos, ALT, AST, Total. Bili, TP); Future  - Lipid panel reflex to direct LDL Non-fasting; Future  - FOOT EXAM  - Albumin Random Urine Quantitative with Creat Ratio; Future  - metFORMIN (GLUCOPHAGE XR) 500 MG 24 hr tablet; Take 2 tablets (1,000 mg) by mouth 2 times daily (with meals)  - aspirin (SM ASPIRIN LOW DOSE) 81 MG EC tablet; Take 1 tablet (81 mg) by mouth daily  - Continuous Blood Gluc Sensor (DEXCOM G6 SENSOR) MISC; Change every 10 days.    Personal history of tobacco use  Smoking cessation recommended  LDCT ordered  - TOBACCO CESSATION - FOR HEALTH MAINTENANCE  - Prof fee: Shared Decision Making for Lung Cancer Screening  - CT Chest Lung Cancer Scrn Low Dose wo; Future    Undiagnosed cardiac murmurs  New murmur detected. Last echo 2013 with fairly normal valves.   - Echocardiogram Complete; Future       BMI:   Estimated body mass index is 26.7 kg/m  as calculated from the following:    Height as of this encounter: 1.822 m (5' 11.75\").    Weight as of this encounter: 88.7 kg (195 lb 8 oz).           Leia Isabel MD  [facility]   Chin is a 63 year old, presenting for the following health issues:  Diabetes        9/6/2023     3:44 PM   Additional Questions   Roomed by Pamela GARY CMA   Accompanied by Self       Diabetes Follow-up  Metformin 1,000mg bid    History of Present Illness       Diabetes:   He presents for follow up of diabetes.   He is checking home blood glucose with a continuous glucose monitor.   He checks blood glucose before meals, after meals, before and after meals and at bedtime.  Blood glucose is sometimes over 200 and never under 70. He is aware of hypoglycemia symptoms including shakiness and weakness. " "  He is concerned about none and other. He has no concerns regarding his diabetes at this time.  He is having numbness in feet.  The patient has had a diabetic eye exam in the last 12 months. Eye exam performed on 1/2023. [facility]        He eats 2-3 servings of fruits and vegetables daily.He consumes 0 sweetened beverage(s) daily.He exercises with enough effort to increase his heart rate 20 to 29 minutes per day.  He exercises with enough effort to increase his heart rate 3 or less days per week.   He is taking medications regularly.           Hyperlipidemia Follow-Up  Atorvastatin 40mg qd  Are you regularly taking any medication or supplement to lower your cholesterol?   Yes- statin  Are you having muscle aches or other side effects that you think could be caused by your cholesterol lowering medication?  No    Hypertension Follow-up  Lisinopril 30mg qd  Do you check your blood pressure regularly outside of the clinic? No   Are you following a low salt diet? No  Are your blood pressures ever more than 140 on the top number (systolic) OR more   than 90 on the bottom number (diastolic), for example 140/90? N/A    BP Readings from Last 2 Encounters:   09/06/23 (!) 142/70   01/18/23 138/70     Hemoglobin A1C (%)   Date Value   01/18/2023 7.9 (H)   05/19/2022 7.1 (H)   12/16/2020 7.6 (A)   09/16/2020 7.8 (A)     LDL Cholesterol Calculated (mg/dL)   Date Value   01/18/2023 57   10/20/2021 62   12/16/2020 81   03/30/2020 64     - Tobacco abuse. He is currently smoking cigarettes, 0.5ppd.    Review of Systems   Constitutional, HEENT, cardiovascular, pulmonary, gi and gu systems are negative, except as otherwise noted.      Objective    BP (!) 142/70   Pulse 79   Temp 97.7  F (36.5  C) (Tympanic)   Resp 20   Ht 1.822 m (5' 11.75\")   Wt 88.7 kg (195 lb 8 oz)   SpO2 98%   BMI 26.70 kg/m    Body mass index is 26.7 kg/m .  Physical Exam   GENERAL: healthy, alert and no distress  NECK: no " adenopathy, no asymmetry, masses, or scars and thyroid normal to palpation  RESP: lungs clear to auscultation - no rales, rhonchi or wheezes  CV: regular rates and rhythm, normal S1 S2, no S3 or S4, grade 3/6 holosystolic murmur heard best over the apex, peripheral pulses strong, and no peripheral edema  ABDOMEN: soft, nontender, no hepatosplenomegaly, no masses and bowel sounds normal  MS: no gross musculoskeletal defects noted, no edema    Diabetic Foot Screen:  Any complaints of increased pain or numbness ?  YES increased numbness  Is there a foot ulcer now or a history of foot ulcer?  YES h/o osteomyelitis  Does the foot have an abnormal shape? No  Are the nails thick, too long or ingrown? No  Are there any redness or open areas?  YES left great toe plantar aspect callous         Sensation Testing done at all points on the diagram with monofilament     Right Foot: Sensation Absent at the following points , 1, 2, and 4  Left Foot: Sensation Absent at the following points , 1, 2, and 4     Risk Category: 3-  Plantar ulceration  Performed by Leia Isabel MD      Results for orders placed or performed in visit on 09/06/23   HEMOGLOBIN A1C     Status: Abnormal   Result Value Ref Range    Hemoglobin A1C 7.5 (H) 0.0 - 5.6 %

## 2023-09-06 NOTE — PROGRESS NOTES
Lung Cancer Screening Shared Decision Making Visit     Chin Jade, a 63 year old male, is eligible for lung cancer screening    History   Smoking Status     Every Day     Packs/day: 0.50     Years: 45.00     Types: Cigarettes   Smokeless Tobacco     Former       I have discussed with patient the risks and benefits of screening for lung cancer with low-dose CT.     The risks include:    radiation exposure: one low dose chest CT has as much ionizing radiation as about 15 chest x-rays, or 6 months of background radiation living in [place]      false positives: most findings/nodules are NOT cancer, but some might still require additional diagnostic evaluation, including biopsy    over-diagnosis: some slow growing cancers that might never have been clinically significant will be detected and treated unnecessarily     The benefit of early detection of lung cancer is contingent upon adherence to annual screening or more frequent follow up if indicated.     Furthermore, to benefit from screening, Chin must be willing and able to undergo diagnostic procedures, if indicated. Although no specific guide is available for determining severity of comorbidities, it is reasonable to withhold screening in patients who have greater mortality risk from other diseases.     We did discuss that the best way to prevent lung cancer is to not smoke.    Some patients may value a numeric estimation of lung cancer risk when evaluating if lung cancer screening is right for them, here is one calculator:    ShouldIScreen

## 2023-09-06 NOTE — PATIENT INSTRUCTIONS
Call to schedule imaging in [phone]  CT scan of the lungs      Echocardiogram to further evaluate murmur  [phone]        Lung Cancer Screening   Frequently Asked Questions  If you are at high-risk for lung cancer, getting screened with low-dose computed tomography (LDCT) every year can help save your life. This handout offers answers to some of the most common questions about lung cancer screening. If you have other questions, please call [phone] ([phone]).     What is it?  Lung cancer screening uses special X-ray technology to create an image of your lung tissue. The exam is quick and easy and takes less than 10 seconds. We don t give you any medicine or use any needles. You can eat before and after the exam. You don t need to change your clothes as long as the clothing on your chest doesn t contain metal. But, you do need to be able to hold your breath for at least 6 seconds during the exam.    What is the goal of lung cancer screening?  The goal of lung cancer screening is to save lives. Many times, lung cancer is not found until a person starts having physical symptoms. Lung cancer screening can help detect lung cancer in the earliest stages when it may be easier to treat.    Who should be screened for lung cancer?  We suggest lung cancer screening for anyone who is at high-risk for lung cancer. You are in the high-risk group if you:     are between the ages of 55 and 79, and   have smoked at least 1 pack of cigarettes a day for 20 or more years, and   still smoke or have quit within the past 15 years.    However, if you have a new cough or shortness of breath, you should talk to your doctor before being screened.    Why does it matter if I have symptoms?  Certain symptoms can be a sign that you have a condition in your lungs that should be checked and treated by your doctor. These symptoms include fever, chest pain, a new or changing cough, shortness of breath that you have  never felt before, coughing up blood or unexplained weight loss. Having any of these symptoms can greatly affect the results of lung cancer screening.       Should all smokers get an LDCT lung cancer screening exam?  It depends. Lung cancer screening is for a very specific group of men and women who have a history of heavy smoking over a long period of time (see  Who should be screened for lung cancer  above).  I am in the high-risk group, but have been diagnosed with cancer in the past. Is LDCT lung cancer screening right for me?  In some cases, you should not have LDCT lung screening, such as when your doctor is already following your cancer with CT scan studies. Your doctor will help you decide if LDCT lung screening is right for you.  Do I need to have a screening exam every year?  Yes. If you are in the high-risk group described earlier, you should get an LDCT lung cancer screening exam every year until you are 79, or are no longer willing or able to undergo screening and possible procedures to diagnose and treat lung cancer.  How effective is LDCT at preventing death from lung cancer?  Studies have shown that LDCT lung cancer screening can lower the risk of death from lung cancer by 20 percent in people who are at high-risk.  What are the risks?  There are some risks and limitations of LDCT lung cancer screening. We want to make sure you understand the risks and benefits, so please let us know if you have any questions. Your doctor may want to talk with you more about these risks.   Radiation exposure: As with any exam that uses radiation, there is a very small increased risk of cancer. The amount of radiation in LDCT is small--about the same amount a person would get from a mammogram. Your doctor orders the exam when he or she feels the potential benefits outweigh the risks.   False negatives: No test is perfect, including LDCT. It is possible that you may have a medical condition, including lung cancer, that  is not found during your exam. This is called a false negative result.   False positives and more testing: LDCT very often finds something in the lung that could be cancer, but in fact is not. This is called a false positive result. False positive tests often cause anxiety. To make sure these findings are not cancer, you may need to have more tests. These tests will be done only if you give us permission. Sometimes patients need a treatment that can have side effects, such as a biopsy. For more information on false positives, see  What can I expect from the results?    Findings not related to lung cancer: Your LDCT exam also takes pictures of areas of your body next to your lungs. In a very small number of cases, the CT scan will show an abnormal finding in one of these areas, such as your kidneys, adrenal glands, liver or thyroid. This finding may not be serious, but you may need more tests. Your doctor can help you decide what other tests you may need, if any.  What can I expect from the results?  About 1 out of 4 LDCT exams will find something that may need more tests. Most of the time, these findings are lung nodules. Lung nodules are very small collections of tissue in the lung. These nodules are very common, and the vast majority--more than 97 percent--are not cancer (benign). Most are normal lymph nodes or small areas of scarring from past infections.  But, if a small lung nodule is found to be cancer, the cancer can be cured more than 90 percent of the time. To know if the nodule is cancer, we may need to get more images before your next yearly screening exam. If the nodule has suspicious features (for example, it is large, has an odd shape or grows over time), we will refer you to a specialist for further testing.  Will my doctor also get the results?  Yes. Your doctor will get a copy of your results.  Is it okay to keep smoking now that there s a cancer screening exam?  No. Tobacco is one of the strongest  cancer-causing agents. It causes not only lung cancer, but other cancers and cardiovascular (heart) diseases as well. The damage caused by smoking builds over time. This means that the longer you smoke, the higher your risk of disease. While it is never too late to quit, the sooner you quit, the better.  Where can I find help to quit smoking?  The best way to prevent lung cancer is to stop smoking. If you have already quit smoking, congratulations and keep it up! For help on quitting smoking, please call [employer] at 8-390-QUITNOW (1-797.112.6339) or the American [employer] Society at 1-842.210.2118 to find local resources near you.  One-on-one health coaching:  If you d prefer to work individually with a health care provider on tobacco cessation, we offer:     Medication Therapy Management:  Our specially trained pharmacists work closely with you and your doctor to help you quit smoking.  Call [phone] or [phone] (toll free).

## 2023-10-24 ENCOUNTER — HOSPITAL ENCOUNTER (OUTPATIENT)
Dept: CARDIOLOGY | Facility: CLINIC | Age: 63
Discharge: HOME | End: 2023-10-24
Attending: FAMILY MEDICINE | Admitting: FAMILY MEDICINE
Payer: COMMERCIAL

## 2023-10-24 DIAGNOSIS — R01.1 UNDIAGNOSED CARDIAC MURMURS: ICD-10-CM

## 2023-10-24 LAB — LVEF ECHO: NORMAL

## 2023-10-24 PROCEDURE — 93306 TTE W/DOPPLER COMPLETE: CPT | Mod: 26 | Performed by: INTERNAL MEDICINE

## 2023-10-24 PROCEDURE — 93306 TTE W/DOPPLER COMPLETE: CPT

## 2023-10-25 ENCOUNTER — MYC MEDICAL ADVICE (OUTPATIENT)
Dept: FAMILY MEDICINE | Facility: CLINIC | Age: 63
End: 2023-10-25
Payer: COMMERCIAL

## 2023-10-25 DIAGNOSIS — E11.42 TYPE 2 DIABETES MELLITUS WITH DIABETIC POLYNEUROPATHY, WITHOUT LONG-TERM CURRENT USE OF INSULIN (H): Primary | ICD-10-CM

## 2023-10-26 ENCOUNTER — TELEPHONE (OUTPATIENT)
Dept: FAMILY MEDICINE | Facility: CLINIC | Age: 63
End: 2023-10-26
Payer: COMMERCIAL

## 2023-10-26 DIAGNOSIS — E11.42 TYPE 2 DIABETES MELLITUS WITH DIABETIC POLYNEUROPATHY, WITHOUT LONG-TERM CURRENT USE OF INSULIN (H): Primary | ICD-10-CM

## 2023-10-26 NOTE — TELEPHONE ENCOUNTER
See my chart message    Is there an alternative to Ozempic 2 mg?    Lelo Medina RN on 10/26/2023 at 9:44 AM

## 2023-10-26 NOTE — TELEPHONE ENCOUNTER
Prior Authorization Retail Medication Request    Medication/Dose: wegovy 1.7mg/0.75ml once weekly  ICD code (if different than what is on RX):  na     Previously Tried and Failed:  na  Rationale: PRIOR AUTH REQUIRED/INVALID; PA REQUESTS    Insurance Name:  [employer]  Insurance ID:  [id number]  :  [phone]  Pharmacy Information (if different than what is on RX)  Name:   pharm[facility] [place]  Phone:  [phone]     Also routed to [facility] pa team

## 2023-10-27 NOTE — TELEPHONE ENCOUNTER
Joelle spouse calling stating that [facility] is needing a verbal Authorization from PCP or another provider before they will fill this medication: Semaglutide-Weight Management (WEGOVY) 1.7 MG/0.75ML pen   [facility] number is [phone]. Joelle stated she did not know why they need a verbal auth when PCP filled out the Rx. [facility] advised Pt they need the verbal.      .Karen Isabel PSC

## 2023-10-30 NOTE — TELEPHONE ENCOUNTER
PA need and in process, spoke with Patient and pharmacy, pharmacy will call patient when Rx available.

## 2023-10-30 NOTE — TELEPHONE ENCOUNTER
Central Prior Authorization Team   Phone: [phone]    PA Initiation    Medication: wegovy 1.7mg/0.75ml  Insurance Company: [employer] - Phone [phone] Fax [phone]  Pharmacy Filling the Rx: [facility] [place], [place] - [address]  Filling Pharmacy Phone: [phone]  Filling Pharmacy Fax:    Start Date: 10/30/2023

## 2023-10-31 NOTE — TELEPHONE ENCOUNTER
PRIOR AUTHORIZATION DENIED    Medication: wegovy 1.7mg/0.75ml    Denial Date: 10/30/2023    Denial Rational:              Appeal Information:    If you would like to appeal, please supply P/A team with a letter of medical necessity with clinical reason.

## 2023-11-02 DIAGNOSIS — E11.42 TYPE 2 DIABETES MELLITUS WITH DIABETIC POLYNEUROPATHY, WITHOUT LONG-TERM CURRENT USE OF INSULIN (H): ICD-10-CM

## 2023-11-02 RX ORDER — PROCHLORPERAZINE 25 MG/1
SUPPOSITORY RECTAL
Qty: 1 EACH | Refills: 1 | Status: SHIPPED | OUTPATIENT
Start: 2023-11-02 | End: 2024-03-06

## 2023-11-02 NOTE — TELEPHONE ENCOUNTER
"Pt reports he has not been able to get the Ozempic. He is open to trying anything at this point even \"pills\". Pt reports today his blood sugars are starting to spike after a week without medication.  Ana Nevarez RN   "

## 2023-11-02 NOTE — TELEPHONE ENCOUNTER
Trulicity (dulaglutide) 1.5 mg weekly (this is a different GLP1) sent to pharmacy.    If unable to get this, I should be notified.    Leia Isabel M.D.

## 2023-11-02 NOTE — TELEPHONE ENCOUNTER
Notify patient that the prior authorization for Wegovy was denied. His insurance will only pay for this if overweight with risk factors.  His BMI is not high enough.    Has he been able to find the Ozempic?    We could also do a prescription for Trulicity which is another medication in the same GLP1 class of medication and is also weekly.    Let me know.    Leia Isabel M.D.

## 2024-02-28 DIAGNOSIS — E78.5 HYPERLIPIDEMIA LDL GOAL <100: ICD-10-CM

## 2024-02-28 DIAGNOSIS — R80.9 MICROALBUMINURIA: ICD-10-CM

## 2024-02-28 DIAGNOSIS — E11.42 TYPE 2 DIABETES MELLITUS WITH DIABETIC POLYNEUROPATHY, WITHOUT LONG-TERM CURRENT USE OF INSULIN (H): ICD-10-CM

## 2024-02-29 RX ORDER — ATORVASTATIN CALCIUM 40 MG/1
40 TABLET, FILM COATED ORAL DAILY
Qty: 90 TABLET | Refills: 3 | Status: SHIPPED | OUTPATIENT
Start: 2024-02-29

## 2024-02-29 RX ORDER — LISINOPRIL 30 MG/1
30 TABLET ORAL DAILY
Qty: 90 TABLET | Refills: 3 | Status: SHIPPED | OUTPATIENT
Start: 2024-02-29

## 2024-02-29 RX ORDER — METFORMIN HCL 500 MG
1000 TABLET, EXTENDED RELEASE 24 HR ORAL 2 TIMES DAILY WITH MEALS
Qty: 360 TABLET | Refills: 1 | Status: SHIPPED | OUTPATIENT
Start: 2024-02-29

## 2024-02-29 NOTE — TELEPHONE ENCOUNTER
Has appointment scheduled for 3/6/24      Requested Prescriptions   Pending Prescriptions Disp Refills    atorvastatin (LIPITOR) 40 MG tablet [Pharmacy Med Name: ATORVASTATIN CALCIUM 40MG TABS] 90 tablet 3     Sig: TAKE 1 TABLET (40 MG) BY MOUTH DAILY       Antihyperlipidemic agents Passed - 2/28/2024  9:53 AM        Passed - Lipid panel on file in past 12 mos     Recent Labs   Lab Test 09/06/23  1551   CHOL 118   TRIG 79   HDL 38*   LDL 64   NHDL 80               Passed - Normal serum ALT on record in past 12 mos     Recent Labs   Lab Test 09/06/23  1551   ALT 19             Passed - Recent (12 mo) or future (30 days) visit within the authorizing provider's specialty     The patient must have completed an in-person or virtual visit within the past 12 months or has a future visit scheduled within the next 90 days with the authorizing provider s specialty.  Urgent care and e-visits do not quality as an office visit for this protocol.          Passed - Medication is active on med list        Passed - Patient is age 18 years or older          metFORMIN (GLUCOPHAGE XR) 500 MG 24 hr tablet [Pharmacy Med Name: METFORMIN HCL ER 500MG TB24] 360 tablet 1     Sig: TAKE 2 TABLETS BY MOUTH 2 TIMES DAILY WITH MEALS       Biguanide Agents Passed - 2/28/2024  9:53 AM        Passed - Patient is age 10 or older        Passed - Patient has documented A1c within the specified period of time.     If HgbA1C is 8 or greater, it needs to be on file within the past 3 months.  If less than 8, must be on file within the past 6 months.     Recent Labs   Lab Test 09/06/23  1551   A1C 7.5*             Passed - Patient does NOT have a diagnosis of CHF.        Passed - Medication is active on med list        Passed - Medication indicated for associated diagnosis     Medication is associated with one or more of the following diagnoses:     Gestational diabetes mellitus     Hyperinsulinar obesity     Hypersecretion of ovarian androgens     Non-alcoholic fatty liver    Polycystic ovarian syndrome               Pre-diabetes (DM 2 prevention)    Type 2 diabetes mellitus     Weight gain, antipsychotic therapy-induced             Passed - Has GFR on file in past 12 months and most recent value is normal        Passed - Recent (6 mo) or future (90 days) visit within the authorizing provider's specialty     The patient must have completed an in-person or virtual visit within the past 6 months or has a future visit scheduled within the next 90 days with the authorizing provider s specialty.  Urgent care and e-visits do not quality as an office visit for this protocol.            lisinopril (ZESTRIL) 30 MG tablet [Pharmacy Med Name: LISINOPRIL 30MG TABS] 90 tablet 3     Sig: TAKE 1 TABLET (30 MG) BY MOUTH DAILY       ACE Inhibitors (Including Combos) Protocol Failed - 2/28/2024  9:53 AM        Failed - Blood pressure under 140/90 in past 12 months     BP Readings from Last 3 Encounters:   09/06/23 (!) 142/70   01/18/23 138/70   05/19/22 132/58                 Passed - Medication is active on med list        Passed - Medication indicated for associated diagnosis     Medication is associated with one or more of the following diagnoses:     Chronic Kidney Disease (CKD)   Coronary Artery Disease (CAD)   Diabetes   Heart Failure (HF)   Hypertension (HTN)   Nephropathy            Passed - Has GFR on file in past 12 months and most recent value is normal        Passed - Recent (12 mo) or future (90 days) visit within the authorizing provider's specialty     The patient must have completed an in-person or virtual visit within the past 12 months or has a future visit scheduled within the next 90 days with the authorizing provider s specialty.  Urgent care and e-visits do not quality as an office visit for this protocol.          Passed - Patient is age 18 or older        Passed - Normal serum creatinine on file in past 12 months     Recent Labs   Lab Test  09/06/23  1551   CR 0.88       Ok to refill medication if creatinine is low          Passed - Normal serum potassium on file in past 12 months     Recent Labs   Lab Test 09/06/23  1551   POTASSIUM 4.3

## 2024-03-06 ENCOUNTER — LAB (OUTPATIENT)
Dept: LAB | Facility: CLINIC | Age: 64
End: 2024-03-06
Payer: COMMERCIAL

## 2024-03-06 ENCOUNTER — OFFICE VISIT (OUTPATIENT)
Dept: FAMILY MEDICINE | Facility: CLINIC | Age: 64
End: 2024-03-06
Attending: FAMILY MEDICINE
Payer: COMMERCIAL

## 2024-03-06 VITALS
HEART RATE: 82 BPM | DIASTOLIC BLOOD PRESSURE: 70 MMHG | SYSTOLIC BLOOD PRESSURE: 138 MMHG | OXYGEN SATURATION: 98 % | WEIGHT: 201 LBS | TEMPERATURE: 97.4 F | HEIGHT: 72 IN | RESPIRATION RATE: 22 BRPM | BODY MASS INDEX: 27.22 KG/M2

## 2024-03-06 DIAGNOSIS — E11.42 TYPE 2 DIABETES MELLITUS WITH DIABETIC POLYNEUROPATHY, WITHOUT LONG-TERM CURRENT USE OF INSULIN (H): ICD-10-CM

## 2024-03-06 DIAGNOSIS — Z72.0 TOBACCO ABUSE: ICD-10-CM

## 2024-03-06 DIAGNOSIS — E11.42 TYPE 2 DIABETES MELLITUS WITH DIABETIC POLYNEUROPATHY, WITHOUT LONG-TERM CURRENT USE OF INSULIN (H): Primary | ICD-10-CM

## 2024-03-06 LAB
HBA1C MFR BLD: 8.5 % (ref 0–5.6)
TSH SERPL DL<=0.005 MIU/L-ACNC: 0.92 UIU/ML (ref 0.3–4.2)

## 2024-03-06 PROCEDURE — 99214 OFFICE O/P EST MOD 30 MIN: CPT | Performed by: FAMILY MEDICINE

## 2024-03-06 PROCEDURE — 83036 HEMOGLOBIN GLYCOSYLATED A1C: CPT

## 2024-03-06 PROCEDURE — 36415 COLL VENOUS BLD VENIPUNCTURE: CPT

## 2024-03-06 PROCEDURE — 84443 ASSAY THYROID STIM HORMONE: CPT

## 2024-03-06 RX ORDER — ACYCLOVIR 400 MG/1
1 TABLET ORAL
Qty: 9 EACH | Refills: 5 | Status: SHIPPED | OUTPATIENT
Start: 2024-03-06

## 2024-03-06 RX ORDER — ACYCLOVIR 400 MG/1
1 TABLET ORAL ONCE
Qty: 1 EACH | Refills: 0 | Status: SHIPPED | OUTPATIENT
Start: 2024-03-06 | End: 2024-03-06

## 2024-03-06 RX ORDER — DULAGLUTIDE 3 MG/.5ML
3 INJECTION, SOLUTION SUBCUTANEOUS WEEKLY
Qty: 6 ML | Refills: 1 | Status: SHIPPED | OUTPATIENT
Start: 2024-03-06

## 2024-03-06 RX ORDER — BLOOD SUGAR DIAGNOSTIC
STRIP MISCELLANEOUS
Qty: 50 STRIP | Refills: 3 | Status: SHIPPED | OUTPATIENT
Start: 2024-03-06

## 2024-03-06 ASSESSMENT — PAIN SCALES - GENERAL: PAINLEVEL: NO PAIN (0)

## 2024-03-06 NOTE — PATIENT INSTRUCTIONS
"You are due for a diabetic eye exam.  Please schedule that and have results sent to me.    Trulicity - increase to 3 mg weekly    HgbA1c in 3 months        When you are out of refills or the refills say \"zero\", it is time to schedule your next appointment in clinic!    Labs are released to you almost immediately and sometimes before I have had a chance to review them.  I review labs regularly and once they are all in, you will either be sent a letter with your results or if you are signed up for on-line services, you will be notified that results are available to you on [employer]. If there are serious findings, you typically will be called.    If you have any questions about your visit, your symptoms, your medication, your test results or it is not clear what your diagnosis or treatment plan is please contact me (via [employer]) or call the care team at [phone] option #2          "

## 2024-03-06 NOTE — PROGRESS NOTES
"  Assessment & Plan     Type 2 diabetes mellitus with diabetic polyneuropathy, without long-term current use of insulin (H)  Increase Trulicity to 3 mg weekly, HgbA1c in 3 months, may increase further to 4.5 mg if needed  Follow up in 6 months with me  Smoking cessation encouraged  - HEMOGLOBIN A1C; Future  - TSH with free T4 reflex; Future  - Continuous Blood Gluc Sensor (DEXCOM G7 SENSOR) MISC; 1 each every 10 days Change sensor every 10 days  - Continuous Blood Gluc Sensor (DEXCOM G7 SENSOR) MISC; 1 each every 10 days Change sensor every 10 days  - Dulaglutide (TRULICITY) 3 MG/0.5ML SOPN; Inject 3 mg Subcutaneous once a week  - Hemoglobin A1c; Future  - ACCU-CHEK GUIDE test strip; Use to test blood sugar 1x daily or as needed to calibrate CGM  - Continuous Blood Gluc  (DEXCOM G7 ) MIKEY; 1 each once for 1 dose Use to read blood sugars as per manufacturers instructions  - Continuous Blood Gluc  (DEXCOM G7 ) MIKEY; 1 each once for 1 dose Use to read blood sugars as per manufacturers instructions    Tobacco abuse  Declined LDCT  - TOBACCO CESSATION - FOR HEALTH MAINTENANCE            Nicotine/Tobacco Cessation  He reports that he has been smoking cigarettes. He has a 22.5 pack-year smoking history. He has quit using smokeless tobacco.  Nicotine/Tobacco Cessation Plan  Information offered: Patient not interested at this time      BMI  Estimated body mass index is 27.45 kg/m  as calculated from the following:    Height as of this encounter: 1.822 m (5' 11.75\").    Weight as of this encounter: 91.2 kg (201 lb).             Lenka Neely is a 64 year old, presenting for the following health issues:  Diabetes        3/6/2024     3:12 PM   Additional Questions   Roomed by Pamela GARY CMA   Accompanied by Self       - Discuss increasing Trulicity    - Order for Dexcom g7    - Tobacco abuse. He is currently smoking cigarettes, 0.5ppd.      Diabetes Follow-up  Trulicity 1.5mg SubQ weekly, " "metformin 1,000mg bid  History of Present Illness       Diabetes:   He presents for follow up of diabetes.   He is checking home blood glucose with a continuous glucose monitor.   He checks blood glucose after meals.  Blood glucose is sometimes over 200 and never under 70. He is aware of hypoglycemia symptoms including shakiness and dizziness.   He is concerned about other.   He is having numbness in feet.  The patient has not had a diabetic eye exam in the last 12 months.                Hyperlipidemia Follow-Up  Atorvastatin 40mg qd  Are you regularly taking any medication or supplement to lower your cholesterol?   Yes- statin  Are you having muscle aches or other side effects that you think could be caused by your cholesterol lowering medication?  No    Hypertension Follow-up  Lisinopril 30mg qd  Do you check your blood pressure regularly outside of the clinic? No   Are you following a low salt diet? No  Are your blood pressures ever more than 140 on the top number (systolic) OR more   than 90 on the bottom number (diastolic), for example 140/90? N/A    BP Readings from Last 2 Encounters:   03/06/24 138/70   09/06/23 (!) 142/70     Hemoglobin A1C (%)   Date Value   09/06/2023 7.5 (H)   01/18/2023 7.9 (H)   12/16/2020 7.6 (A)   09/16/2020 7.8 (A)     LDL Cholesterol Calculated (mg/dL)   Date Value   09/06/2023 64   01/18/2023 57   12/16/2020 81   03/30/2020 64           Review of Systems  Constitutional, HEENT, cardiovascular, pulmonary, gi and gu systems are negative, except as otherwise noted.      Objective    /70   Pulse 82   Temp 97.4  F (36.3  C) (Tympanic)   Resp 22   Ht 1.822 m (5' 11.75\")   Wt 91.2 kg (201 lb)   SpO2 98%   BMI 27.45 kg/m    Body mass index is 27.45 kg/m .  Physical Exam   GENERAL: alert and no distress  NECK: no adenopathy, no asymmetry, masses, or scars  RESP: lungs clear to auscultation - no rales, rhonchi or wheezes  CV: regular rates and rhythm, normal S1 S2, no S3 or S4, " grade 2/6 systolic murmur heard best over the rusb, peripheral pulses strong, and no peripheral edema  ABDOMEN: soft, nontender, no hepatosplenomegaly, no masses and bowel sounds normal  MS: no gross musculoskeletal defects noted, no edema    Results for orders placed or performed in visit on 03/06/24   HEMOGLOBIN A1C     Status: Abnormal   Result Value Ref Range    Hemoglobin A1C 8.5 (H) 0.0 - 5.6 %             Signed Electronically by: Leia Isabel MD

## 2024-03-21 ENCOUNTER — TELEPHONE (OUTPATIENT)
Dept: FAMILY MEDICINE | Facility: CLINIC | Age: 64
End: 2024-03-21
Payer: COMMERCIAL

## 2024-03-21 NOTE — TELEPHONE ENCOUNTER
Patient Quality Outreach    Patient is due for the following:   None    Next Steps:   - Schedule wellness visit  - Vaccine update    Type of outreach:    Sent [employer] message.      Questions for provider review:    None           Pamela Perera, CMA

## 2024-05-31 DIAGNOSIS — E11.42 TYPE 2 DIABETES MELLITUS WITH DIABETIC POLYNEUROPATHY, WITHOUT LONG-TERM CURRENT USE OF INSULIN (H): ICD-10-CM

## 2024-05-31 RX ORDER — SEMAGLUTIDE 2.68 MG/ML
2 INJECTION, SOLUTION SUBCUTANEOUS
Qty: 9 ML | Refills: 1 | Status: SHIPPED | OUTPATIENT
Start: 2024-05-31

## 2024-05-31 NOTE — TELEPHONE ENCOUNTER
Patients wife calling, patient is at work with no reception on phone.   Requesting Ozempic 2 mg refill.   Previously switched to Trulicity as Ozempic was out of stock, now Trulicity is not available.   Pended for  pharmacy.   Patient is out of medication and due for injection on Sunday.   States  [facility] has Ozempic in stock.   DX: Type 2 diabetes mellitus with diabetic polyneuropathy, without long-term current use of insulin (H) [E11.42]  - Primary     Wife Joelle: [phone] CTC on file  Please notify when complete    Lelo Medina RN on 5/31/2024 at 8:11 AM

## 2024-06-22 ENCOUNTER — HEALTH MAINTENANCE LETTER (OUTPATIENT)
Age: 64
End: 2024-06-22

## 2024-09-19 ENCOUNTER — TELEPHONE (OUTPATIENT)
Dept: FAMILY MEDICINE | Facility: CLINIC | Age: 64
End: 2024-09-19
Payer: COMMERCIAL

## 2024-09-19 NOTE — TELEPHONE ENCOUNTER
Patient Quality Outreach    Patient is due for the following:   None    Next Steps:   - Annual wellness visit, med check with fasting labs  - Vaccine update    Type of outreach:    Sent [employer] message.      Questions for provider review:    None           Pamela Perera, CMA

## 2024-10-10 ENCOUNTER — TRANSFERRED RECORDS (OUTPATIENT)
Dept: HEALTH INFORMATION MANAGEMENT | Facility: CLINIC | Age: 64
End: 2024-10-10
Payer: COMMERCIAL

## 2024-10-10 LAB — RETINOPATHY: POSITIVE

## 2024-10-20 DIAGNOSIS — E11.42 TYPE 2 DIABETES MELLITUS WITH DIABETIC POLYNEUROPATHY, WITHOUT LONG-TERM CURRENT USE OF INSULIN (H): ICD-10-CM

## 2024-10-21 RX ORDER — ASPIRIN 81 MG/1
81 TABLET, DELAYED RELEASE ORAL DAILY
Qty: 100 TABLET | Refills: 1 | Status: SHIPPED | OUTPATIENT
Start: 2024-10-21

## 2024-11-05 DIAGNOSIS — E11.42 TYPE 2 DIABETES MELLITUS WITH DIABETIC POLYNEUROPATHY, WITHOUT LONG-TERM CURRENT USE OF INSULIN (H): ICD-10-CM

## 2024-11-05 NOTE — TELEPHONE ENCOUNTER
Appointment tomorrow    Requested Prescriptions   Pending Prescriptions Disp Refills    OZEMPIC, 2 MG/DOSE, 8 MG/3ML pen [Pharmacy Med Name: OZEMPIC (2 MG/DOSE) 8MG/3ML SOPN] 9 mL 1     Sig: INJECT 2 MG UNDER THE SKIN EVERY 7 DAYS       GLP-1 Agonists Protocol Failed - 11/5/2024 12:56 PM        Failed - Has GFR on file in past 12 months and most recent value is normal   GFR Estimate   Date Value Ref Range Status   09/06/2023 >90 >60 mL/min/1.73m2 Final   12/16/2020 >90 >60 ml/min/1.73m2 Final          Passed - Medication is active on med list        Passed - Recent (6 mo) or future (90 days) visit within the authorizing provider's specialty     The patient must have completed an in-person or virtual visit within the past 6 months or has a future visit scheduled within the next 90 days with the authorizing provider s specialty.  Urgent care and e-visits do not quality as an office visit for this protocol.          Passed - Medication indicated for associated diagnosis     Medication is associated with one or more of the following diagnoses:     Type 2 diabetes mellitus           Passed - Patient is age 18 or older

## 2024-11-06 ENCOUNTER — OFFICE VISIT (OUTPATIENT)
Dept: FAMILY MEDICINE | Facility: CLINIC | Age: 64
End: 2024-11-06
Payer: COMMERCIAL

## 2024-11-06 VITALS
HEIGHT: 72 IN | HEART RATE: 97 BPM | RESPIRATION RATE: 16 BRPM | OXYGEN SATURATION: 97 % | BODY MASS INDEX: 26.47 KG/M2 | TEMPERATURE: 98.2 F | DIASTOLIC BLOOD PRESSURE: 74 MMHG | WEIGHT: 195.4 LBS | SYSTOLIC BLOOD PRESSURE: 138 MMHG

## 2024-11-06 DIAGNOSIS — Z23 HIGH PRIORITY FOR 2019-NCOV VACCINE: Primary | ICD-10-CM

## 2024-11-06 DIAGNOSIS — R80.9 MICROALBUMINURIA: ICD-10-CM

## 2024-11-06 DIAGNOSIS — E78.5 HYPERLIPIDEMIA LDL GOAL <100: ICD-10-CM

## 2024-11-06 DIAGNOSIS — I10 ESSENTIAL HYPERTENSION: ICD-10-CM

## 2024-11-06 DIAGNOSIS — E11.42 TYPE 2 DIABETES MELLITUS WITH DIABETIC POLYNEUROPATHY, WITHOUT LONG-TERM CURRENT USE OF INSULIN (H): ICD-10-CM

## 2024-11-06 LAB
ANION GAP SERPL CALCULATED.3IONS-SCNC: 10 MMOL/L (ref 7–15)
BUN SERPL-MCNC: 23.4 MG/DL (ref 8–23)
CALCIUM SERPL-MCNC: 9.5 MG/DL (ref 8.8–10.4)
CHLORIDE SERPL-SCNC: 106 MMOL/L (ref 98–107)
CHOLEST SERPL-MCNC: 119 MG/DL
CREAT SERPL-MCNC: 0.92 MG/DL (ref 0.67–1.17)
CREAT UR-MCNC: 140.4 MG/DL
EGFRCR SERPLBLD CKD-EPI 2021: >90 ML/MIN/1.73M2
EST. AVERAGE GLUCOSE BLD GHB EST-MCNC: 180 MG/DL
FASTING STATUS PATIENT QL REPORTED: NO
FASTING STATUS PATIENT QL REPORTED: NO
GLUCOSE SERPL-MCNC: 129 MG/DL (ref 70–99)
HBA1C MFR BLD: 7.9 % (ref 0–5.6)
HCO3 SERPL-SCNC: 27 MMOL/L (ref 22–29)
HDLC SERPL-MCNC: 38 MG/DL
LDLC SERPL CALC-MCNC: 67 MG/DL
MICROALBUMIN UR-MCNC: 89.2 MG/L
MICROALBUMIN/CREAT UR: 63.53 MG/G CR (ref 0–17)
NONHDLC SERPL-MCNC: 81 MG/DL
POTASSIUM SERPL-SCNC: 4.8 MMOL/L (ref 3.4–5.3)
SODIUM SERPL-SCNC: 143 MMOL/L (ref 135–145)
TRIGL SERPL-MCNC: 71 MG/DL

## 2024-11-06 PROCEDURE — 99214 OFFICE O/P EST MOD 30 MIN: CPT | Performed by: FAMILY MEDICINE

## 2024-11-06 PROCEDURE — 83036 HEMOGLOBIN GLYCOSYLATED A1C: CPT | Performed by: FAMILY MEDICINE

## 2024-11-06 PROCEDURE — 90480 ADMN SARSCOV2 VAC 1/ONLY CMP: CPT | Performed by: FAMILY MEDICINE

## 2024-11-06 PROCEDURE — 80048 BASIC METABOLIC PNL TOTAL CA: CPT | Performed by: FAMILY MEDICINE

## 2024-11-06 PROCEDURE — 82043 UR ALBUMIN QUANTITATIVE: CPT | Performed by: FAMILY MEDICINE

## 2024-11-06 PROCEDURE — 36415 COLL VENOUS BLD VENIPUNCTURE: CPT | Performed by: FAMILY MEDICINE

## 2024-11-06 PROCEDURE — 80061 LIPID PANEL: CPT | Performed by: FAMILY MEDICINE

## 2024-11-06 PROCEDURE — 99207 PR FOOT EXAM NO CHARGE: CPT | Performed by: FAMILY MEDICINE

## 2024-11-06 PROCEDURE — 91320 SARSCV2 VAC 30MCG TRS-SUC IM: CPT | Performed by: FAMILY MEDICINE

## 2024-11-06 PROCEDURE — 82570 ASSAY OF URINE CREATININE: CPT | Performed by: FAMILY MEDICINE

## 2024-11-06 RX ORDER — SEMAGLUTIDE 2.68 MG/ML
INJECTION, SOLUTION SUBCUTANEOUS
Qty: 9 ML | Refills: 1 | OUTPATIENT
Start: 2024-11-06

## 2024-11-06 RX ORDER — ATORVASTATIN CALCIUM 40 MG/1
40 TABLET, FILM COATED ORAL DAILY
Qty: 90 TABLET | Refills: 3 | Status: SHIPPED | OUTPATIENT
Start: 2024-11-06

## 2024-11-06 RX ORDER — ACYCLOVIR 400 MG/1
1 TABLET ORAL
Qty: 9 EACH | Refills: 5 | Status: SHIPPED | OUTPATIENT
Start: 2024-11-06

## 2024-11-06 RX ORDER — SEMAGLUTIDE 2.68 MG/ML
2 INJECTION, SOLUTION SUBCUTANEOUS
Qty: 9 ML | Refills: 1 | Status: SHIPPED | OUTPATIENT
Start: 2024-11-06

## 2024-11-06 RX ORDER — ACYCLOVIR 400 MG/1
TABLET ORAL
COMMUNITY
Start: 2024-03-06

## 2024-11-06 RX ORDER — METFORMIN HYDROCHLORIDE 500 MG/1
1000 TABLET, EXTENDED RELEASE ORAL 2 TIMES DAILY WITH MEALS
Qty: 360 TABLET | Refills: 1 | Status: SHIPPED | OUTPATIENT
Start: 2024-11-06

## 2024-11-06 RX ORDER — LISINOPRIL 30 MG/1
30 TABLET ORAL DAILY
Qty: 90 TABLET | Refills: 3 | Status: SHIPPED | OUTPATIENT
Start: 2024-11-06

## 2024-11-06 NOTE — PATIENT INSTRUCTIONS
"Start Jardiance- empagliflozin 10 mg daily    See me in 6 months    Flu and covid given today    SHINGLES VACCINE/SHINGRIX  If you had chicken pox as a child, you are at risk of shingles (a painful rash with blisters that can sometimes lead to chronic nerve pain or blindness if it affects your eye).    The new Shingrix vaccine is supposed to be more effective at preventing shingles (98%).  Even if you had Zostavax (the original shingles vaccine), this is recommended. I encourage people to check with their pharmacy (or ours) and have them run it through to check the cost.  It's often better covered through your pharmacy benefit.      It is a two part vaccine series - one now and one in 2-6 months.  Many people will feel a bit \"flu like\" with fever and body aches for a few days after the injection.  Taking ibuprofen can help with this.      When you are out of refills or the refills say \"zero\", it is time to schedule your next appointment in clinic!    Labs are released to you almost immediately and sometimes before I have had a chance to review them.  I review labs regularly and once they are all in, you will either be sent a letter with your results or if you are signed up for on-line services, you will be notified that results are available to you on [employer]. If there are serious findings, you typically will be called.    If you have any questions about your visit, your symptoms, your medication, your test results or it is not clear what your diagnosis or treatment plan is please contact me (via [employer]) or call the care team at [phone] and say \"Care Team\"          "

## 2024-11-06 NOTE — PROGRESS NOTES
"  Assessment & Plan     Type 2 diabetes mellitus with diabetic polyneuropathy, without long-term current use of insulin (H)  Continue metformin and Ozempic    Add Jardiance 10 mg daily    - HEMOGLOBIN A1C; Future  - Lipid panel reflex to direct LDL Non-fasting; Future  - Albumin Random Urine Quantitative with Creat Ratio; Future  - FOOT EXAM  - Hemoglobin A1c  - Lipid panel reflex to direct LDL Non-fasting  - Albumin Random Urine Quantitative with Creat Ratio  - Semaglutide, 2 MG/DOSE, (OZEMPIC, 2 MG/DOSE,) 8 MG/3ML pen; Inject 2 mg subcutaneously every 7 days.  - metFORMIN (GLUCOPHAGE XR) 500 MG 24 hr tablet; Take 2 tablets (1,000 mg) by mouth 2 times daily (with meals).  - Continuous Glucose Sensor (DEXCOM G7 SENSOR) MISC; 1 each every 10 days. Change sensor every 10 days  - empagliflozin (JARDIANCE) 10 MG TABS tablet; Take 1 tablet (10 mg) by mouth daily.    Essential hypertension   Well controlled  - BASIC METABOLIC PANEL; Future  - BASIC METABOLIC PANEL    Microalbuminuria     - lisinopril (ZESTRIL) 30 MG tablet; Take 1 tablet (30 mg) by mouth daily.    Hyperlipidemia LDL goal <100     - atorvastatin (LIPITOR) 40 MG tablet; Take 1 tablet (40 mg) by mouth daily.    Tobacco abuse  Declined LDCT  Encouraged cessation        BMI  Estimated body mass index is 26.7 kg/m  as calculated from the following:    Height as of this encounter: 1.822 m (5' 11.73\").    Weight as of this encounter: 88.6 kg (195 lb 6.4 oz).       Lenka Neely is a 64 year old, presenting for the following health issues:  Diabetes        11/6/2024     2:50 PM   Additional Questions   Roomed by Felecia   Accompanied by Self     History of Present Illness       Diabetes:   He presents for follow up of diabetes.   He is checking home blood glucose with a continuous glucose monitor.   He checks blood glucose after meals.  Blood glucose is sometimes over 200 and never under 70. He is aware of hypoglycemia symptoms including shakiness and " "weakness.    He has no concerns regarding his diabetes at this time.  He is having numbness in feet.                  Hyperlipidemia Follow-Up    Are you regularly taking any medication or supplement to lower your cholesterol?   Yes- atorvastatin  Are you having muscle aches or other side effects that you think could be caused by your cholesterol lowering medication?  No    Hypertension Follow-up    Do you check your blood pressure regularly outside of the clinic? No   Are you following a low salt diet? No  Are your blood pressures ever more than 140 on the top number (systolic) OR more   than 90 on the bottom number (diastolic), for example 140/90? No          Review of Systems  Constitutional, HEENT, cardiovascular, pulmonary, gi and gu systems are negative, except as otherwise noted.      Objective    /74   Pulse 97   Temp 98.2  F (36.8  C) (Tympanic)   Resp 16   Ht 1.822 m (5' 11.73\")   Wt 88.6 kg (195 lb 6.4 oz)   SpO2 97%   BMI 26.70 kg/m    Body mass index is 26.7 kg/m .  Physical Exam   GENERAL: alert and no distress  NECK: no adenopathy, no asymmetry, masses, or scars  RESP: lungs clear to auscultation - no rales, rhonchi or wheezes  CV: regular rate and rhythm, normal S1 S2, no S3 or S4, no murmur, click or rub, no peripheral edema  ABDOMEN: soft, nontender, no hepatosplenomegaly, no masses and bowel sounds normal  MS: no gross musculoskeletal defects noted, no edema    Diabetic Foot Screen:  Any complaints of increased pain or numbness ?  YES numbness in both feet, L>R  Is there a foot ulcer now or a history of foot ulcer? No  Does the foot have an abnormal shape? Yes - callous right great toe medial aspect  Are the nails thick, too long or ingrown? No  Are there any redness or open areas? No         Sensation Testing done at all points on the diagram with monofilament     Right Foot: Sensation Absent at the following points  and 1  Left Foot: Sensation Absent at the following points  and 1 "     Risk Category: 2- Loss of protective sensation with weakness, deformity, pre-ulcer or callous but no ulceration  Performed by Leia Isabel MD      Results for orders placed or performed in visit on 11/06/24   Hemoglobin A1c     Status: Abnormal   Result Value Ref Range    Estimated Average Glucose 180 (H) <117 mg/dL    Hemoglobin A1C 7.9 (H) 0.0 - 5.6 %             Signed Electronically by: Leia Isabel MD

## 2024-11-07 NOTE — RESULT ENCOUNTER NOTE
Chin,    These labs are normal or acceptable/stable.    Please contact my office if you have questions.    Leia Isabel M.D.

## 2024-12-17 ENCOUNTER — MYC REFILL (OUTPATIENT)
Dept: FAMILY MEDICINE | Facility: CLINIC | Age: 64
End: 2024-12-17
Payer: COMMERCIAL

## 2024-12-17 DIAGNOSIS — B00.1 RECURRENT HERPES LABIALIS: ICD-10-CM

## 2024-12-17 RX ORDER — ACYCLOVIR 400 MG/1
400 TABLET ORAL
Qty: 30 TABLET | Refills: 0 | Status: SHIPPED | OUTPATIENT
Start: 2024-12-17

## 2025-01-19 ENCOUNTER — MYC REFILL (OUTPATIENT)
Dept: FAMILY MEDICINE | Facility: CLINIC | Age: 65
End: 2025-01-19
Payer: COMMERCIAL

## 2025-01-19 DIAGNOSIS — E11.42 TYPE 2 DIABETES MELLITUS WITH DIABETIC POLYNEUROPATHY, WITHOUT LONG-TERM CURRENT USE OF INSULIN (H): ICD-10-CM

## 2025-01-19 DIAGNOSIS — R80.9 MICROALBUMINURIA: ICD-10-CM

## 2025-01-20 RX ORDER — METFORMIN HYDROCHLORIDE 500 MG/1
1000 TABLET, EXTENDED RELEASE ORAL 2 TIMES DAILY WITH MEALS
Qty: 360 TABLET | Refills: 1 | OUTPATIENT
Start: 2025-01-20

## 2025-01-20 RX ORDER — LISINOPRIL 30 MG/1
30 TABLET ORAL DAILY
Qty: 90 TABLET | Refills: 3 | OUTPATIENT
Start: 2025-01-20

## 2025-04-14 DIAGNOSIS — E11.42 TYPE 2 DIABETES MELLITUS WITH DIABETIC POLYNEUROPATHY, WITHOUT LONG-TERM CURRENT USE OF INSULIN (H): ICD-10-CM

## 2025-04-14 RX ORDER — SEMAGLUTIDE 2.68 MG/ML
2 INJECTION, SOLUTION SUBCUTANEOUS
Qty: 9 ML | Refills: 0 | Status: SHIPPED | OUTPATIENT
Start: 2025-04-14

## 2025-05-01 ENCOUNTER — TELEPHONE (OUTPATIENT)
Dept: FAMILY MEDICINE | Facility: CLINIC | Age: 65
End: 2025-05-01
Payer: COMMERCIAL

## 2025-05-01 NOTE — TELEPHONE ENCOUNTER
Patient Quality Outreach    Patient is due for the following:   None    Action(s) Taken:   - Schedule wellness visit with fasting clabs  - Vaccine update  - Tobacco cessation    Type of outreach:    Sent [employer] message.    Questions for provider review:    None         Pamela Perera CMA  Chart routed to Care Team.

## 2025-05-10 ENCOUNTER — HEALTH MAINTENANCE LETTER (OUTPATIENT)
Age: 65
End: 2025-05-10

## 2025-05-18 DIAGNOSIS — E11.42 TYPE 2 DIABETES MELLITUS WITH DIABETIC POLYNEUROPATHY, WITHOUT LONG-TERM CURRENT USE OF INSULIN (H): ICD-10-CM

## 2025-05-19 RX ORDER — ASPIRIN 81 MG/1
81 TABLET, DELAYED RELEASE ORAL DAILY
Qty: 100 TABLET | Refills: 1 | Status: SHIPPED | OUTPATIENT
Start: 2025-05-19

## 2025-06-19 ENCOUNTER — OFFICE VISIT (OUTPATIENT)
Dept: FAMILY MEDICINE | Facility: CLINIC | Age: 65
End: 2025-06-19
Payer: COMMERCIAL

## 2025-06-19 VITALS
HEART RATE: 82 BPM | OXYGEN SATURATION: 97 % | DIASTOLIC BLOOD PRESSURE: 66 MMHG | RESPIRATION RATE: 16 BRPM | TEMPERATURE: 97.4 F | SYSTOLIC BLOOD PRESSURE: 122 MMHG | WEIGHT: 190.25 LBS | HEIGHT: 72 IN | BODY MASS INDEX: 25.77 KG/M2

## 2025-06-19 DIAGNOSIS — L57.0 AK (ACTINIC KERATOSIS): ICD-10-CM

## 2025-06-19 DIAGNOSIS — Z00.00 ROUTINE GENERAL MEDICAL EXAMINATION AT A HEALTH CARE FACILITY: Primary | ICD-10-CM

## 2025-06-19 DIAGNOSIS — I10 ESSENTIAL HYPERTENSION: ICD-10-CM

## 2025-06-19 DIAGNOSIS — E11.42 TYPE 2 DIABETES MELLITUS WITH DIABETIC POLYNEUROPATHY, WITHOUT LONG-TERM CURRENT USE OF INSULIN (H): ICD-10-CM

## 2025-06-19 DIAGNOSIS — Z72.0 TOBACCO ABUSE: ICD-10-CM

## 2025-06-19 DIAGNOSIS — E78.5 HYPERLIPIDEMIA LDL GOAL <100: ICD-10-CM

## 2025-06-19 LAB
EST. AVERAGE GLUCOSE BLD GHB EST-MCNC: 177 MG/DL
HBA1C MFR BLD: 7.8 % (ref 0–5.6)
HOLD SPECIMEN: NORMAL
HOLD SPECIMEN: NORMAL

## 2025-06-19 RX ORDER — ACYCLOVIR 400 MG/1
1 TABLET ORAL
Qty: 9 EACH | Refills: 5 | Status: SHIPPED | OUTPATIENT
Start: 2025-06-19

## 2025-06-19 RX ORDER — SEMAGLUTIDE 2.68 MG/ML
2 INJECTION, SOLUTION SUBCUTANEOUS
Qty: 9 ML | Refills: 1 | Status: SHIPPED | OUTPATIENT
Start: 2025-06-19

## 2025-06-19 RX ORDER — METFORMIN HYDROCHLORIDE 500 MG/1
1000 TABLET, EXTENDED RELEASE ORAL 2 TIMES DAILY WITH MEALS
Qty: 360 TABLET | Refills: 1 | Status: SHIPPED | OUTPATIENT
Start: 2025-06-19

## 2025-06-19 SDOH — HEALTH STABILITY: PHYSICAL HEALTH: ON AVERAGE, HOW MANY DAYS PER WEEK DO YOU ENGAGE IN MODERATE TO STRENUOUS EXERCISE (LIKE A BRISK WALK)?: 3 DAYS

## 2025-06-19 SDOH — HEALTH STABILITY: PHYSICAL HEALTH: ON AVERAGE, HOW MANY MINUTES DO YOU ENGAGE IN EXERCISE AT THIS LEVEL?: 20 MIN

## 2025-06-19 ASSESSMENT — PAIN SCALES - GENERAL: PAINLEVEL_OUTOF10: NO PAIN (0)

## 2025-06-19 ASSESSMENT — SOCIAL DETERMINANTS OF HEALTH (SDOH): HOW OFTEN DO YOU GET TOGETHER WITH FRIENDS OR RELATIVES?: ONCE A WEEK

## 2025-06-19 NOTE — PATIENT INSTRUCTIONS
Increase Jardiance (empagliflozin) to 25 mg daily.  New prescription sent to pharmacy.        Patient Education   Preventive Care Advice   This is general advice given by our system to help you stay healthy. However, your care team may have specific advice just for you. Please talk to your care team about your preventive care needs.  Nutrition  Eat 5 or more servings of fruits and vegetables each day.  Try wheat bread, brown rice and whole grain pasta (instead of white bread, rice, and pasta).  Get enough calcium and vitamin D. Check the label on foods and aim for 100% of the RDA (recommended daily allowance).  Lifestyle  Exercise at least 150 minutes each week  (30 minutes a day, 5 days a week).  Do muscle strengthening activities 2 days a week. These help control your weight and prevent disease.  No smoking.  Wear sunscreen to prevent skin cancer.  Have a dental exam and cleaning every 6 months.  Yearly exams  See your health care team every year to talk about:  Any changes in your health.  Any medicines your care team has prescribed.  Preventive care, family planning, and ways to prevent chronic diseases.  Shots (vaccines)   HPV shots (up to age 26), if you've never had them before.  Hepatitis B shots (up to age 59), if you've never had them before.  COVID-19 shot: Get this shot when it's due.  Flu shot: Get a flu shot every year.  Tetanus shot: Get a tetanus shot every 10 years.  Pneumococcal, hepatitis A, and RSV shots: Ask your care team if you need these based on your risk.  Shingles shot (for age 50 and up)  General health tests  Diabetes screening:  Starting at age 35, Get screened for diabetes at least every 3 years.  If you are younger than age 35, ask your care team if you should be screened for diabetes.  Cholesterol test: At age 39, start having a cholesterol test every 5 years, or more often if advised.  Bone density scan (DEXA): At age 50, ask your care team if you should have this scan for  osteoporosis (brittle bones).  Hepatitis C: Get tested at least once in your life.  STIs (sexually transmitted infections)  Before age 24: Ask your care team if you should be screened for STIs.  After age 24: Get screened for STIs if you're at risk. You are at risk for STIs (including HIV) if:  You are sexually active with more than one person.  You don't use condoms every time.  You or a partner was diagnosed with a sexually transmitted infection.  If you are at risk for HIV, ask about PrEP medicine to prevent HIV.  Get tested for HIV at least once in your life, whether you are at risk for HIV or not.  Cancer screening tests  Cervical cancer screening: If you have a cervix, begin getting regular cervical cancer screening tests starting at age 21.  Breast cancer scan (mammogram): If you've ever had breasts, begin having regular mammograms starting at age 40. This is a scan to check for breast cancer.  Colon cancer screening: It is important to start screening for colon cancer at age 45.  Have a colonoscopy test every 10 years (or more often if you're at risk) Or, ask your provider about stool tests like a FIT test every year or Cologuard test every 3 years.  To learn more about your testing options, visit:   .  For help making a decision, visit:   https://bit.ly/fz22186.  Prostate cancer screening test: If you have a prostate, ask your care team if a prostate cancer screening test (PSA) at age 55 is right for you.  Lung cancer screening: If you are a current or former smoker ages 50 to 80, ask your care team if ongoing lung cancer screenings are right for you.  For informational purposes only. Not to replace the advice of your health care provider. Copyright   2023 [employer]. All rights reserved. Clinically reviewed by the [facility]itions Program. [employer] [id number] - REV 01/24.

## 2025-06-19 NOTE — PROGRESS NOTES
"Preventive Care Visit  [facility]  Leia Isabel MD, Family Medicine  Jun 19, 2025      Assessment & Plan     Routine general medical examination at a health care facility   Declined LDCT  Declined Pneumovax    Type 2 diabetes mellitus with diabetic polyneuropathy, without long-term current use of insulin (H)  Fair control, but would like this to be better given his age  Increase Jardiance to 25 mg daily  Recheck 6 months    - HEMOGLOBIN A1C; Future  - HEMOGLOBIN A1C  - Continuous Glucose Sensor (DEXCOM G7 SENSOR) MISC; 1 each every 10 days. Change sensor every 10 days  - empagliflozin (JARDIANCE) 25 MG TABS tablet; Take 1 tablet (25 mg) by mouth daily.  - metFORMIN (GLUCOPHAGE XR) 500 MG 24 hr tablet; Take 2 tablets (1,000 mg) by mouth 2 times daily (with meals).  - Semaglutide, 2 MG/DOSE, (OZEMPIC, 2 MG/DOSE,) 8 MG/3ML pen; Inject 2 mg subcutaneously every 7 days.    Essential hypertension  Well controlled    Hyperlipidemia LDL goal <100  Continue statin    Tobacco abuse  LDCT recommended- declined  Recommend smoking cessation  - TOBACCO CESSATION - FOR HEALTH MAINTENANCE    Actinic keratosis  Left upper trap/shoulder  Risks, benefits and alternatives discussed     Treated with liquid nitrogen x 3 freeze/thaw cycles    Patient has been advised of split billing requirements and indicates understanding: Yes        Nicotine/Tobacco Cessation  He reports that he has been smoking cigarettes. He has a 22.5 pack-year smoking history. He has quit using smokeless tobacco.  Nicotine/Tobacco Cessation Plan  Information offered: Patient not interested at this time      BMI  Estimated body mass index is 26 kg/m  as calculated from the following:    Height as of this encounter: 1.822 m (5' 11.73\").    Weight as of this encounter: 86.3 kg (190 lb 4 oz).     Reviewed preventive health counseling, as reflected in patient instructions       Regular exercise       Healthy diet/nutrition       Consider " lung cancer screening for ages 55-80 years (77 for Medicare) and 20 pack-year smoking history - declined  Counseling  Appropriate preventive services were addressed with this patient via screening, questionnaire, or discussion as appropriate for fall prevention, nutrition, physical activity, Tobacco-use cessation, social engagement, weight loss and cognition.  Checklist reviewing preventive services available has been given to the patient.  Reviewed patient's diet, addressing concerns and/or questions.   He is at risk for lack of exercise and has been provided with information to increase physical activity for the benefit of his well-being.             Lenka Neely is a 65 year old, presenting for the following:  Physical        6/19/2025    10:16 AM   Additional Questions   Roomed by Pamela GARY CMA   Accompanied by Self          HPI     - Tobacco abuse. He is currently smoking cigarettes, 0.5ppd.    Diabetes Follow-up  Jardiance 10mg every day, ozempic 2mg SubQ weekly, metformin 1,000mg bid  How often are you checking your blood sugar? Continuous glucose monitor  What time of day are you checking your blood sugars (select all that apply)?  Before and after meals  Have you had any blood sugars above 200?  Yes   Have you had any blood sugars below 70?  No  What symptoms do you notice when your blood sugar is low?  Shaky, Dizzy, and Weak  What concerns do you have today about your diabetes? None   Do you have any of these symptoms? (Select all that apply)  Numbness in feet and Burning in feet          Hyperlipidemia Follow-Up  Atorvastatin 40mg qd  Are you regularly taking any medication or supplement to lower your cholesterol?   Yes- statin  Are you having muscle aches or other side effects that you think could be caused by your cholesterol lowering medication?  No    Hypertension Follow-up  Lisinopril 30mg qd  Do you check your blood pressure regularly outside of the clinic? No   Are you following a low salt diet?  No  Are your blood pressures ever more than 140 on the top number (systolic) OR more   than 90 on the bottom number (diastolic), for example 140/90? N/A    BP Readings from Last 2 Encounters:   06/19/25 122/66   11/06/24 138/74     Hemoglobin A1C (%)   Date Value   11/06/2024 7.9 (H)   03/06/2024 8.5 (H)   12/16/2020 7.6 (A)   09/16/2020 7.8 (A)     LDL Cholesterol Calculated (mg/dL)   Date Value   11/06/2024 67   09/06/2023 64   12/16/2020 81   03/30/2020 64       Advance Care Planning    Discussed advance care planning with patient; however, patient declined at this time.        6/19/2025   General Health   How would you rate your overall physical health? Good   Feel stress (tense, anxious, or unable to sleep) Not at all         6/19/2025   Nutrition   Three or more servings of calcium each day? Yes   Diet: Diabetic   How many servings of fruit and vegetables per day? (!) 2-3   How many sweetened beverages each day? 0-1         6/19/2025   Exercise   Days per week of moderate/strenous exercise 3 days   Average minutes spent exercising at this level 20 min         6/19/2025   Social Factors   Frequency of gathering with friends or relatives Once a week   Worry food won't last until get money to buy more Patient declined   Food not last or not have enough money for food? Patient declined   Do you have housing? (Housing is defined as stable permanent housing and does not include staying outside in a car, in a tent, in an abandoned building, in an overnight shelter, or couch-surfing.) Patient declined   Are you worried about losing your housing? Patient declined   Lack of transportation? Patient declined   Unable to get utilities (heat,electricity)? Patient declined         6/19/2025   Fall Risk   Fallen 2 or more times in the past year? No   Trouble with walking or balance? No          6/19/2025   Dental   Dentist two times every year? Yes         Today's PHQ-2 Score:       6/19/2025    10:16 AM   PHQ-2 ( 1999  "[employer])   Q1: Little interest or pleasure in doing things 0   Q2: Feeling down, depressed or hopeless 0   PHQ-2 Score 0    Q1: Little interest or pleasure in doing things Not at all   Q2: Feeling down, depressed or hopeless Not at all   PHQ-2 Score 0       Patient-reported           6/19/2025   Substance Use   Alcohol more than 3/day or more than 7/wk Not Applicable   Do you use any other substances recreationally? No     Social History     Tobacco Use    Smoking status: Every Day     Current packs/day: 0.50     Average packs/day: 0.5 packs/day for 45.0 years (22.5 ttl pk-yrs)     Types: Cigarettes    Smokeless tobacco: Former   Vaping Use    Vaping status: Never Used   Substance Use Topics    Alcohol use: No    Drug use: No           6/19/2025   STI Screening   New sexual partner(s) since last STI/HIV test? No   Last PSA: No results found for: \"PSA\"  ASCVD Risk   The ASCVD Risk score (Bonnie JUNIOR, et al., 2019) failed to calculate for the following reasons:    The valid total cholesterol range is 130 to 320 mg/dL           Reviewed and updated as needed this visit by Provider      Problems                     Review of Systems  Constitutional, HEENT, cardiovascular, pulmonary, gi and gu systems are negative, except as otherwise noted.     Objective    Exam  /66   Pulse 82   Temp 97.4  F (36.3  C) (Tympanic)   Resp 16   Ht 1.822 m (5' 11.73\")   Wt 86.3 kg (190 lb 4 oz)   SpO2 97%   BMI 26.00 kg/m     Estimated body mass index is 26 kg/m  as calculated from the following:    Height as of this encounter: 1.822 m (5' 11.73\").    Weight as of this encounter: 86.3 kg (190 lb 4 oz).    Physical Exam  GENERAL: alert and no distress  NECK: no adenopathy, no asymmetry, masses, or scars  RESP: lungs clear to auscultation - no rales, rhonchi or wheezes  CV: regular rate and rhythm, normal S1 S2, no S3 or S4, no murmur, click or rub, no peripheral edema  ABDOMEN: soft, nontender, no hepatosplenomegaly, no " masses and bowel sounds normal  MS: no gross musculoskeletal defects noted, no edema  SKIN: keratoses - actinic # 1 left upper trapezius/shoudler - frozen        Signed Electronically by: Leia Isabel MD

## 2025-08-25 DIAGNOSIS — E11.42 TYPE 2 DIABETES MELLITUS WITH DIABETIC POLYNEUROPATHY, WITHOUT LONG-TERM CURRENT USE OF INSULIN (H): ICD-10-CM

## 2025-08-25 RX ORDER — ASPIRIN 81 MG/1
81 TABLET, DELAYED RELEASE ORAL DAILY
Qty: 100 TABLET | Refills: 1 | OUTPATIENT
Start: 2025-08-25

## 2025-08-26 DIAGNOSIS — E11.42 TYPE 2 DIABETES MELLITUS WITH DIABETIC POLYNEUROPATHY, WITHOUT LONG-TERM CURRENT USE OF INSULIN (H): ICD-10-CM

## 2025-08-28 ENCOUNTER — MYC REFILL (OUTPATIENT)
Dept: FAMILY MEDICINE | Facility: CLINIC | Age: 65
End: 2025-08-28
Payer: COMMERCIAL

## 2025-08-28 ENCOUNTER — MYC MEDICAL ADVICE (OUTPATIENT)
Dept: FAMILY MEDICINE | Facility: CLINIC | Age: 65
End: 2025-08-28
Payer: COMMERCIAL

## 2025-08-28 DIAGNOSIS — E11.42 TYPE 2 DIABETES MELLITUS WITH DIABETIC POLYNEUROPATHY, WITHOUT LONG-TERM CURRENT USE OF INSULIN (H): ICD-10-CM

## 2025-08-28 RX ORDER — ASPIRIN 81 MG/1
81 TABLET, COATED ORAL DAILY
Qty: 100 TABLET | Refills: 0 | Status: SHIPPED | OUTPATIENT
Start: 2025-08-28

## 2025-08-28 RX ORDER — ASPIRIN 81 MG/1
81 TABLET, DELAYED RELEASE ORAL DAILY
Qty: 100 TABLET | Refills: 1 | OUTPATIENT
Start: 2025-08-28

## 2025-08-28 RX ORDER — ASPIRIN 81 MG/1
81 TABLET, COATED ORAL DAILY
Qty: 100 TABLET | Refills: 0 | OUTPATIENT
Start: 2025-08-28

## (undated) DEVICE — PREP CHLORHEXIDINE 4% 4OZ (HIBICLENS) 57504

## (undated) DEVICE — GLOVE PROTEXIS W/NEU-THERA 7.5  2D73TE75

## (undated) DEVICE — IMM ALUMI HAND XLG 761

## (undated) DEVICE — BLADE KNIFE BEAVER 376700

## (undated) DEVICE — PACK HAND

## (undated) DEVICE — PREP SKIN SCRUB TRAY 4461A

## (undated) DEVICE — GOWN LG DISP 9515

## (undated) DEVICE — DECANTER VIAL 2006S

## (undated) DEVICE — GLOVE PROTEXIS W/NEU-THERA 7.0  2D73TE70

## (undated) DEVICE — BNDG ELASTIC 2"X5YDS UNSTERILE 6611-20

## (undated) DEVICE — GLOVE PROTEXIS BLUE W/NEU-THERA 7.0  2D73EB70

## (undated) DEVICE — SOL WATER IRRIG 1000ML BOTTLE 07139-09

## (undated) DEVICE — SU ETHILON 4-0 PS-2 18" 1667G

## (undated) RX ORDER — LIDOCAINE HYDROCHLORIDE 10 MG/ML
INJECTION, SOLUTION EPIDURAL; INFILTRATION; INTRACAUDAL; PERINEURAL
Status: DISPENSED
Start: 2020-11-06

## (undated) RX ORDER — CEPHALEXIN 500 MG/1
CAPSULE ORAL
Status: DISPENSED
Start: 2019-01-15

## (undated) RX ORDER — ACETAMINOPHEN 325 MG/1
TABLET ORAL
Status: DISPENSED
Start: 2019-01-15

## (undated) RX ORDER — PROPOFOL 10 MG/ML
INJECTION, EMULSION INTRAVENOUS
Status: DISPENSED
Start: 2020-11-06

## (undated) RX ORDER — LIDOCAINE HYDROCHLORIDE AND EPINEPHRINE 10; 10 MG/ML; UG/ML
INJECTION, SOLUTION INFILTRATION; PERINEURAL
Status: DISPENSED
Start: 2019-01-15